# Patient Record
Sex: FEMALE | Race: OTHER | HISPANIC OR LATINO
[De-identification: names, ages, dates, MRNs, and addresses within clinical notes are randomized per-mention and may not be internally consistent; named-entity substitution may affect disease eponyms.]

---

## 2017-01-09 ENCOUNTER — RX RENEWAL (OUTPATIENT)
Age: 82
End: 2017-01-09

## 2017-01-11 ENCOUNTER — APPOINTMENT (OUTPATIENT)
Dept: INTERNAL MEDICINE | Facility: CLINIC | Age: 82
End: 2017-01-11

## 2017-01-11 ENCOUNTER — LABORATORY RESULT (OUTPATIENT)
Age: 82
End: 2017-01-11

## 2017-01-11 VITALS
BODY MASS INDEX: 45.64 KG/M2 | HEART RATE: 61 BPM | DIASTOLIC BLOOD PRESSURE: 70 MMHG | OXYGEN SATURATION: 98 % | SYSTOLIC BLOOD PRESSURE: 140 MMHG | TEMPERATURE: 97.8 F | HEIGHT: 62 IN | RESPIRATION RATE: 12 BRPM | WEIGHT: 248 LBS

## 2017-01-18 LAB
ALBUMIN SERPL ELPH-MCNC: 4.2 G/DL
ALP BLD-CCNC: 65 U/L
ALT SERPL-CCNC: 18 U/L
ANION GAP SERPL CALC-SCNC: 12 MMOL/L
AST SERPL-CCNC: 25 U/L
BASOPHILS # BLD AUTO: 0.08 K/UL
BASOPHILS NFR BLD AUTO: 1.8 %
BILIRUB SERPL-MCNC: 0.7 MG/DL
BUN SERPL-MCNC: 17 MG/DL
CALCIUM SERPL-MCNC: 9 MG/DL
CHLORIDE SERPL-SCNC: 102 MMOL/L
CO2 SERPL-SCNC: 29 MMOL/L
CREAT SERPL-MCNC: 0.72 MG/DL
EOSINOPHIL # BLD AUTO: 0.25 K/UL
EOSINOPHIL NFR BLD AUTO: 5.3 %
GLUCOSE SERPL-MCNC: 94 MG/DL
HCT VFR BLD CALC: 39.9 %
HGB BLD-MCNC: 12.5 G/DL
LYMPHOCYTES # BLD AUTO: 1.55 K/UL
LYMPHOCYTES NFR BLD AUTO: 33.3 %
MAN DIFF?: NORMAL
MCHC RBC-ENTMCNC: 31.3 GM/DL
MCHC RBC-ENTMCNC: 32.8 PG
MCV RBC AUTO: 104.7 FL
MONOCYTES # BLD AUTO: 0.53 K/UL
MONOCYTES NFR BLD AUTO: 11.4 %
NEUTROPHILS # BLD AUTO: 2.12 K/UL
NEUTROPHILS NFR BLD AUTO: 45.6 %
PLATELET # BLD AUTO: 206 K/UL
POTASSIUM SERPL-SCNC: 4.2 MMOL/L
PROT SERPL-MCNC: 7.4 G/DL
RBC # BLD: 3.81 M/UL
RBC # FLD: 20.9 %
SODIUM SERPL-SCNC: 143 MMOL/L
WBC # FLD AUTO: 4.66 K/UL

## 2017-02-15 ENCOUNTER — APPOINTMENT (OUTPATIENT)
Dept: INTERNAL MEDICINE | Facility: CLINIC | Age: 82
End: 2017-02-15

## 2017-03-23 ENCOUNTER — APPOINTMENT (OUTPATIENT)
Dept: INTERNAL MEDICINE | Facility: CLINIC | Age: 82
End: 2017-03-23

## 2017-03-23 VITALS
DIASTOLIC BLOOD PRESSURE: 80 MMHG | SYSTOLIC BLOOD PRESSURE: 126 MMHG | OXYGEN SATURATION: 96 % | TEMPERATURE: 98.1 F | HEART RATE: 81 BPM

## 2017-03-23 DIAGNOSIS — R53.83 OTHER FATIGUE: ICD-10-CM

## 2017-03-23 DIAGNOSIS — H26.9 UNSPECIFIED CATARACT: ICD-10-CM

## 2017-10-10 ENCOUNTER — RX RENEWAL (OUTPATIENT)
Age: 82
End: 2017-10-10

## 2017-10-15 ENCOUNTER — RX RENEWAL (OUTPATIENT)
Age: 82
End: 2017-10-15

## 2018-04-24 ENCOUNTER — RX RENEWAL (OUTPATIENT)
Age: 83
End: 2018-04-24

## 2018-07-20 ENCOUNTER — RX RENEWAL (OUTPATIENT)
Age: 83
End: 2018-07-20

## 2018-08-10 ENCOUNTER — TRANSCRIPTION ENCOUNTER (OUTPATIENT)
Age: 83
End: 2018-08-10

## 2018-08-29 ENCOUNTER — RX RENEWAL (OUTPATIENT)
Age: 83
End: 2018-08-29

## 2018-08-30 ENCOUNTER — APPOINTMENT (OUTPATIENT)
Dept: INTERNAL MEDICINE | Facility: CLINIC | Age: 83
End: 2018-08-30
Payer: MEDICARE

## 2018-08-30 ENCOUNTER — LABORATORY RESULT (OUTPATIENT)
Age: 83
End: 2018-08-30

## 2018-08-30 VITALS
TEMPERATURE: 97.7 F | OXYGEN SATURATION: 96 % | SYSTOLIC BLOOD PRESSURE: 132 MMHG | DIASTOLIC BLOOD PRESSURE: 70 MMHG | WEIGHT: 260 LBS | HEIGHT: 62 IN | HEART RATE: 75 BPM | BODY MASS INDEX: 47.84 KG/M2 | RESPIRATION RATE: 12 BRPM

## 2018-08-30 PROCEDURE — 36415 COLL VENOUS BLD VENIPUNCTURE: CPT

## 2018-08-30 PROCEDURE — 99213 OFFICE O/P EST LOW 20 MIN: CPT | Mod: 25

## 2018-08-30 RX ORDER — ZOSTER VACCINE RECOMBINANT, ADJUVANTED 50 MCG/0.5
50 KIT INTRAMUSCULAR
Qty: 1 | Refills: 1 | Status: ACTIVE | COMMUNITY
Start: 2018-08-30 | End: 1900-01-01

## 2018-08-30 NOTE — PHYSICAL EXAM
[No Acute Distress] : no acute distress [Well Nourished] : well nourished [de-identified] : leaning to right slightly with gait

## 2018-08-30 NOTE — PLAN
[FreeTextEntry1] : referral for PT with unsteady gait\par  Check thyroid - tsh level today\par  continue synthroid

## 2018-08-30 NOTE — HISTORY OF PRESENT ILLNESS
[FreeTextEntry1] : thyroid follow up [de-identified] : Needs refill of medication\par \par Balance has been off recently -  - usually walks with a shopping cart.    not using a cane but has one.   Walking at house no issue.

## 2018-08-31 LAB
ALBUMIN SERPL ELPH-MCNC: 4.6 G/DL
ALP BLD-CCNC: 63 U/L
ALT SERPL-CCNC: 21 U/L
ANION GAP SERPL CALC-SCNC: 15 MMOL/L
AST SERPL-CCNC: 26 U/L
BASOPHILS # BLD AUTO: 0.04 K/UL
BASOPHILS NFR BLD AUTO: 0.9 %
BILIRUB SERPL-MCNC: 0.8 MG/DL
BUN SERPL-MCNC: 16 MG/DL
CALCIUM SERPL-MCNC: 9.8 MG/DL
CHLORIDE SERPL-SCNC: 105 MMOL/L
CHOLEST SERPL-MCNC: 130 MG/DL
CHOLEST/HDLC SERPL: 1.9 RATIO
CO2 SERPL-SCNC: 28 MMOL/L
CREAT SERPL-MCNC: 0.71 MG/DL
EOSINOPHIL # BLD AUTO: 0.07 K/UL
EOSINOPHIL NFR BLD AUTO: 1.7 %
GLUCOSE SERPL-MCNC: 102 MG/DL
HBA1C MFR BLD HPLC: 5.7 %
HCT VFR BLD CALC: 37.4 %
HDLC SERPL-MCNC: 70 MG/DL
HGB BLD-MCNC: 11.8 G/DL
LDLC SERPL CALC-MCNC: 43 MG/DL
LYMPHOCYTES # BLD AUTO: 1.18 K/UL
LYMPHOCYTES NFR BLD AUTO: 29.6 %
MAN DIFF?: NORMAL
MCHC RBC-ENTMCNC: 31.6 GM/DL
MCHC RBC-ENTMCNC: 32.1 PG
MCV RBC AUTO: 101.6 FL
MONOCYTES # BLD AUTO: 0.48 K/UL
MONOCYTES NFR BLD AUTO: 12.2 %
NEUTROPHILS # BLD AUTO: 2.14 K/UL
NEUTROPHILS NFR BLD AUTO: 53.9 %
PLATELET # BLD AUTO: 188 K/UL
POTASSIUM SERPL-SCNC: 4.1 MMOL/L
PROT SERPL-MCNC: 7.9 G/DL
RBC # BLD: 3.68 M/UL
RBC # FLD: 22.9 %
SODIUM SERPL-SCNC: 148 MMOL/L
TRIGL SERPL-MCNC: 87 MG/DL
TSH SERPL-ACNC: 13.27 UIU/ML
WBC # FLD AUTO: 3.97 K/UL

## 2018-10-11 ENCOUNTER — MESSAGE (OUTPATIENT)
Age: 83
End: 2018-10-11

## 2018-11-06 ENCOUNTER — EMERGENCY (EMERGENCY)
Facility: HOSPITAL | Age: 83
LOS: 1 days | Discharge: HOME | End: 2018-11-06
Attending: EMERGENCY MEDICINE | Admitting: EMERGENCY MEDICINE
Payer: MEDICARE

## 2018-11-06 VITALS
RESPIRATION RATE: 18 BRPM | TEMPERATURE: 98 F | HEART RATE: 85 BPM | DIASTOLIC BLOOD PRESSURE: 80 MMHG | SYSTOLIC BLOOD PRESSURE: 162 MMHG | OXYGEN SATURATION: 97 %

## 2018-11-06 DIAGNOSIS — S09.90XA UNSPECIFIED INJURY OF HEAD, INITIAL ENCOUNTER: ICD-10-CM
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
## 2018-11-06 DIAGNOSIS — E03.9 HYPOTHYROIDISM, UNSPECIFIED: ICD-10-CM
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
## 2018-11-06 DIAGNOSIS — Z88.1 ALLERGY STATUS TO OTHER ANTIBIOTIC AGENTS STATUS: ICD-10-CM

## 2018-11-06 PROCEDURE — 99284 EMERGENCY DEPT VISIT MOD MDM: CPT

## 2018-11-06 PROCEDURE — 70450 CT HEAD/BRAIN W/O DYE: CPT | Mod: 26

## 2018-11-06 NOTE — ED PROVIDER NOTE - SKIN, MLM
Skin normal color for race, warm, dry and intact. No evidence of rash. + large 5 cm L sided frontal hematoma.

## 2018-11-06 NOTE — ED ADULT NURSE NOTE - CHPI ED NUR SYMPTOMS NEG
no vomiting/no deformity/no numbness/no fever/no abrasion/no bleeding/no tingling/no weakness/no loss of consciousness/no confusion

## 2018-11-06 NOTE — ED PROVIDER NOTE - NEUROLOGICAL, MLM
Alert and oriented, no focal deficits, no motor or sensory deficits. CNs intact. Normal Romberg. Normal finger to nose. No pronator drift. Normal rapid alternating movements/heel to shin. Sensation intact to all extremities. 5/5 strength to all extremities. Alert and oriented, no focal deficits, no motor or sensory deficits. Clear speech. Follows basic commands. No truncal ataxia.

## 2018-11-06 NOTE — ED PROVIDER NOTE - MEDICAL DECISION MAKING DETAILS
82y F with PMHx rhematoid arthritis, hypothyroidism, presents with minor head injury s/p mechanical fall. No LOC. Not on blood thinners. +scalp hematoma on exam, otherwise nonfocal examination. CT head to rule out intracranial bleeding. 82y F with PMHx rhematoid arthritis, hypothyroidism, presents with minor head injury s/p mechanical fall. No LOC. Not on blood thinners. +scalp hematoma on exam, otherwise nonfocal examination. CT brain ordered.

## 2018-11-06 NOTE — ED PROVIDER NOTE - OBJECTIVE STATEMENT
82y F with PMHx rheumatoid arthritis, hypothyroidism (on Synthroid 137mg 5 days/week), fluid retention (on furosemide), takes losartan, not on anticoagulants, accompanied by fellow volunteer at [employer], BIB EMS s/p mechanical fall. 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 No LOC. Pt now with soreness and bruising to area of trauma, but no other complaints. She was able to push herself up using nearby stairs. Pt states her balance is typically "not good", but does not ambulate with cane or walker. Took Advil this morning. Denies dizziness, lightheadedness, CP, SOB, nausea/vomiting, visual changes, neck pain, hip pain. 82y F with PMHx rheumatoid arthritis, hypothyroidism (on Synthroid 137mg 5 days/week), fluid retention (on furosemide), takes losartan, not on anticoagulants, accompanied by fellow volunteer at [employer], BIB EMS s/p mechanical fall. 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 No LOC. Pt now with soreness and bruising to area of trauma, but no other complaints. She was able to push herself up using nearby stairs. Pt states her balance is typically "not good", but does not ambulate with cane or walker. Took Advil this morning. Denies dizziness, lightheadedness, nausea/vomiting, visual changes, neck pain, hip pain.

## 2018-11-06 NOTE — ED ADULT TRIAGE NOTE - CHIEF COMPLAINT QUOTE
Pt BIBA s/p mechanical fall with +head injury, no LOC, no use of blood thinners. Pt presents with hematoma to left forehead and pain to site, denies any disorientation/confusion/dizziness or blurred vision.

## 2018-11-06 NOTE — ED ADULT NURSE NOTE - OBJECTIVE STATEMENT
Patient presents to ED s/p mechanical fall with head injury. - LOC. Hematoma noted to left forehead. Pt denies any pain or discomfort, HA, N/V, blurry vision, dizziness. Pt reports she is unbalanced at baseline. Pt states she was able to push herself up to seated position but was unable to stand and walk after because she has bad knees.

## 2018-11-06 NOTE — ED ADULT NURSE NOTE - NSIMPLEMENTINTERV_GEN_ALL_ED
Implemented All Fall Risk Interventions:  [place] to call system. Call bell, personal items and telephone within reach. Instruct patient to call for assistance. Room bathroom lighting operational. Non-slip footwear when patient is off stretcher. Physically safe environment: no spills, clutter or unnecessary equipment. Stretcher in lowest position, wheels locked, appropriate side rails in place. Provide visual cue, wrist band, yellow gown, etc. Monitor gait and stability. Monitor for mental status changes and reorient to person, place, and time. Review medications for side effects contributing to fall risk. Reinforce activity limits and safety measures with patient and family.

## 2018-11-06 NOTE — ED PROVIDER NOTE - PROGRESS NOTE DETAILS
NO acute findings on CT scan.  D/w patient.  Concussion precautions given in detail. Conservative management discussed with the patient in detail.  Close PMD follow up encouraged.  Strict ED return instructions discussed in detail and patient given the opportunity to ask any questions about their discharge diagnosis and instructions

## 2019-01-17 ENCOUNTER — RX RENEWAL (OUTPATIENT)
Age: 84
End: 2019-01-17

## 2019-01-28 PROBLEM — E03.9 HYPOTHYROIDISM, UNSPECIFIED: Chronic | Status: ACTIVE | Noted: 2018-11-06

## 2019-01-28 PROBLEM — R60.9 EDEMA, UNSPECIFIED: Chronic | Status: ACTIVE | Noted: 2018-11-06

## 2019-02-08 ENCOUNTER — LABORATORY RESULT (OUTPATIENT)
Age: 84
End: 2019-02-08

## 2019-02-08 ENCOUNTER — APPOINTMENT (OUTPATIENT)
Dept: HEART AND VASCULAR | Facility: CLINIC | Age: 84
End: 2019-02-08
Payer: MEDICARE

## 2019-02-08 VITALS
BODY MASS INDEX: 48.29 KG/M2 | TEMPERATURE: 97.6 F | OXYGEN SATURATION: 96 % | WEIGHT: 264 LBS | DIASTOLIC BLOOD PRESSURE: 79 MMHG | RESPIRATION RATE: 14 BRPM | HEART RATE: 79 BPM | SYSTOLIC BLOOD PRESSURE: 124 MMHG

## 2019-02-08 PROCEDURE — 36415 COLL VENOUS BLD VENIPUNCTURE: CPT

## 2019-02-08 PROCEDURE — 93000 ELECTROCARDIOGRAM COMPLETE: CPT

## 2019-02-08 PROCEDURE — G0008: CPT

## 2019-02-08 PROCEDURE — 93306 TTE W/DOPPLER COMPLETE: CPT

## 2019-02-08 PROCEDURE — 99214 OFFICE O/P EST MOD 30 MIN: CPT

## 2019-02-08 PROCEDURE — 90662 IIV NO PRSV INCREASED AG IM: CPT

## 2019-02-08 NOTE — REASON FOR VISIT
[Follow-Up - Clinic] : a clinic follow-up of [Hypertension] : hypertension [Medication Management] : Medication management [FreeTextEntry1] : 83 year old female , only child,  with past hx of   exertional dyspnea and fatigue. She also reports exertional pain in back of neck without dizziness, syncope, chest pains. denies prior hx of MI, angina, arrhythmias, strokes, CHF.  Has hx of thyroid ablation  and takes levothyroxine with  most recent TSH was high in August 2018. .   She reports that while walking at a normal pace , she becomes short of breath within half a block. \par \par She denies hx of HTN

## 2019-02-08 NOTE — PHYSICAL EXAM
[Well Groomed] : well groomed [No Deformities] : no deformities [General Appearance - In No Acute Distress] : no acute distress [Normal Conjunctiva] : the conjunctiva exhibited no abnormalities [Eyelids - No Xanthelasma] : the eyelids demonstrated no xanthelasmas [Normal Oral Mucosa] : normal oral mucosa [No Oral Pallor] : no oral pallor [No Oral Cyanosis] : no oral cyanosis [Normal Jugular Venous A Waves Present] : normal jugular venous A waves present [Normal Jugular Venous V Waves Present] : normal jugular venous V waves present [No Jugular Venous Stephen A Waves] : no jugular venous stephen A waves [Respiration, Rhythm And Depth] : normal respiratory rhythm and effort [Exaggerated Use Of Accessory Muscles For Inspiration] : no accessory muscle use [Auscultation Breath Sounds / Voice Sounds] : lungs were clear to auscultation bilaterally [Heart Rate And Rhythm] : heart rate and rhythm were normal [Heart Sounds] : normal S1 and S2 [Arterial Pulses Normal] : the arterial pulses were normal [Veins - Varicosity Changes] : no varicosital changes were noted in the lower extremities [Systolic grade ___/6] : A grade [unfilled]/6 systolic murmur was heard. [Abnormal Walk] : normal gait [Gait - Sufficient For Exercise Testing] : the gait was sufficient for exercise testing [Nail Clubbing] : no clubbing of the fingernails [Cyanosis, Localized] : no localized cyanosis [Petechial Hemorrhages (___cm)] : no petechial hemorrhages [Nail Splinter Hemorrhages] : no splinter hemorrhages of the nails [Fingers Osler's Nodes] : Osler's nodes were not seenon the fingers [Skin Turgor] : normal skin turgor [] : no rash [No Venous Stasis] : no venous stasis [Skin Lesions] : no skin lesions [No Skin Ulcers] : no skin ulcer [No Xanthoma] : no  xanthoma was observed [Oriented To Time, Place, And Person] : oriented to person, place, and time [Impaired Insight] : insight and judgment were intact [Affect] : the affect was normal [Mood] : the mood was normal [Memory Recent] : recent memory was not impaired [Memory Remote] : remote memory was not impaired [No Anxiety] : not feeling anxious [FreeTextEntry1] : acne rosacea

## 2019-02-08 NOTE — ASSESSMENT
[FreeTextEntry1] : Controlled HTN\par \par fall   with closed head injury  without  intracranial bleeding \par \par  dry macular degeneration right eye \par \par \par Moderate mitral regurgitation

## 2019-02-08 NOTE — DISCUSSION/SUMMARY
[Essential Hypertension] : essential hypertension [Deteriorating] : deteriorating [None] : none [With Me] : with me [FreeTextEntry1] : venipuncture with TSH, lipids, Hgba1c \par \par \par echocardiogram to assess status of mitral regurgitation

## 2019-02-08 NOTE — REVIEW OF SYSTEMS
[Recent Weight Loss (___ Lbs)] : recent [unfilled] ~Ulb weight loss [Dyspnea on exertion] : dyspnea during exertion [Lower Ext Edema] : lower extremity edema [Joint Pain] : joint pain [Joint Stiffness] : joint stiffness [Negative] : Heme/Lymph [Fever] : no fever [Headache] : no headache [Chills] : no chills [Feeling Fatigued] : not feeling fatigued [Shortness Of Breath] : no shortness of breath [Chest  Pressure] : no chest pressure [Chest Pain] : no chest pain [Leg Claudication] : no intermittent leg claudication [Palpitations] : no palpitations [Joint Swelling] : no joint swelling [Muscle Cramps] : no muscle cramps [Limb Weakness (Paresis)] : no limb weakness

## 2019-02-08 NOTE — HISTORY OF PRESENT ILLNESS
[FreeTextEntry1] : Had bad   trip and fall  November 6 and sustained a contusion of the left frontal area with negative CT scan of head but had mild concussion. No fractures \par \par Compliant with all medications \par \par Requires TSH with reflex T4 , HgbA1c

## 2019-02-09 LAB
25(OH)D3 SERPL-MCNC: 43.3 NG/ML
ALBUMIN SERPL ELPH-MCNC: 4.7 G/DL
ALP BLD-CCNC: 65 U/L
ALT SERPL-CCNC: 16 U/L
ANION GAP SERPL CALC-SCNC: 12 MMOL/L
AST SERPL-CCNC: 24 U/L
BASOPHILS # BLD AUTO: 0 K/UL
BASOPHILS NFR BLD AUTO: 0 %
BILIRUB SERPL-MCNC: 0.7 MG/DL
BUN SERPL-MCNC: 17 MG/DL
CALCIUM SERPL-MCNC: 9.7 MG/DL
CHLORIDE SERPL-SCNC: 103 MMOL/L
CO2 SERPL-SCNC: 28 MMOL/L
CREAT SERPL-MCNC: 0.66 MG/DL
EOSINOPHIL # BLD AUTO: 0.04 K/UL
EOSINOPHIL NFR BLD AUTO: 0.9 %
ESTIMATED AVERAGE GLUCOSE: 114 MG/DL
GLUCOSE SERPL-MCNC: 90 MG/DL
HBA1C MFR BLD HPLC: 5.6 %
HCT VFR BLD CALC: 39.1 %
HGB BLD-MCNC: 12 G/DL
LYMPHOCYTES # BLD AUTO: 1.36 K/UL
LYMPHOCYTES NFR BLD AUTO: 31.6 %
MAN DIFF?: NORMAL
MCHC RBC-ENTMCNC: 30.7 GM/DL
MCHC RBC-ENTMCNC: 30.8 PG
MCV RBC AUTO: 100.5 FL
MONOCYTES # BLD AUTO: 0.34 K/UL
MONOCYTES NFR BLD AUTO: 7.9 %
NEUTROPHILS # BLD AUTO: 2.38 K/UL
NEUTROPHILS NFR BLD AUTO: 55.2 %
PLATELET # BLD AUTO: 202 K/UL
POTASSIUM SERPL-SCNC: 4.3 MMOL/L
PROT SERPL-MCNC: 8 G/DL
RBC # BLD: 3.89 M/UL
RBC # FLD: 21.7 %
SODIUM SERPL-SCNC: 143 MMOL/L
WBC # FLD AUTO: 4.31 K/UL

## 2019-02-10 LAB — TSH SERPL-ACNC: 8.22 UIU/ML

## 2019-09-14 ENCOUNTER — RX RENEWAL (OUTPATIENT)
Age: 84
End: 2019-09-14

## 2019-11-04 ENCOUNTER — MESSAGE (OUTPATIENT)
Age: 84
End: 2019-11-04

## 2019-12-27 ENCOUNTER — RX RENEWAL (OUTPATIENT)
Age: 84
End: 2019-12-27

## 2020-01-22 ENCOUNTER — LABORATORY RESULT (OUTPATIENT)
Age: 85
End: 2020-01-22

## 2020-01-22 ENCOUNTER — APPOINTMENT (OUTPATIENT)
Dept: HEART AND VASCULAR | Facility: CLINIC | Age: 85
End: 2020-01-22
Payer: MEDICARE

## 2020-01-22 VITALS
HEIGHT: 62 IN | DIASTOLIC BLOOD PRESSURE: 60 MMHG | SYSTOLIC BLOOD PRESSURE: 110 MMHG | OXYGEN SATURATION: 99 % | HEART RATE: 63 BPM | WEIGHT: 267 LBS | BODY MASS INDEX: 49.13 KG/M2 | RESPIRATION RATE: 14 BRPM

## 2020-01-22 DIAGNOSIS — R06.00 DYSPNEA, UNSPECIFIED: ICD-10-CM

## 2020-01-22 DIAGNOSIS — I34.0 NONRHEUMATIC MITRAL (VALVE) INSUFFICIENCY: ICD-10-CM

## 2020-01-22 DIAGNOSIS — I49.3 VENTRICULAR PREMATURE DEPOLARIZATION: ICD-10-CM

## 2020-01-22 DIAGNOSIS — R94.31 ABNORMAL ELECTROCARDIOGRAM [ECG] [EKG]: ICD-10-CM

## 2020-01-22 DIAGNOSIS — Z86.39 PERSONAL HISTORY OF OTHER ENDOCRINE, NUTRITIONAL AND METABOLIC DISEASE: ICD-10-CM

## 2020-01-22 PROCEDURE — 93306 TTE W/DOPPLER COMPLETE: CPT

## 2020-01-22 PROCEDURE — 99214 OFFICE O/P EST MOD 30 MIN: CPT

## 2020-01-22 PROCEDURE — 36415 COLL VENOUS BLD VENIPUNCTURE: CPT

## 2020-01-22 PROCEDURE — G0008: CPT

## 2020-01-22 PROCEDURE — 93000 ELECTROCARDIOGRAM COMPLETE: CPT

## 2020-01-22 PROCEDURE — 90662 IIV NO PRSV INCREASED AG IM: CPT

## 2020-01-22 NOTE — REASON FOR VISIT
[Follow-Up - Clinic] : a clinic follow-up of [Abnormal ECG] : an abnormal ECG [Dyspnea] : dyspnea [Hyperlipidemia] : hyperlipidemia [FreeTextEntry1] : 84  year old female , only child,  with past hx of   exertional dyspnea and fatigue has hypertension , adult hypothyroidism  and morbid obesity . \par \par Prior echocardiogram showed severely enlarged left atrium  and moderate pulmonary hypertension with at least moderate MR  and mild aortic stenosis \par \par She denies hx of MI, CHF, strokes , syncope \par \par

## 2020-01-22 NOTE — ASSESSMENT
[FreeTextEntry1] : stable hemodynamics \par \par Numerous unifocal VPCs\par \par BMI 49\par \par \par \par

## 2020-01-22 NOTE — PHYSICAL EXAM
[Well Groomed] : well groomed [No Deformities] : no deformities [General Appearance - In No Acute Distress] : no acute distress [Normal Conjunctiva] : the conjunctiva exhibited no abnormalities [Eyelids - No Xanthelasma] : the eyelids demonstrated no xanthelasmas [No Oral Cyanosis] : no oral cyanosis [Normal Jugular Venous A Waves Present] : normal jugular venous A waves present [Normal Jugular Venous V Waves Present] : normal jugular venous V waves present [No Jugular Venous Stephen A Waves] : no jugular venous stephen A waves [Respiration, Rhythm And Depth] : normal respiratory rhythm and effort [Exaggerated Use Of Accessory Muscles For Inspiration] : no accessory muscle use [Auscultation Breath Sounds / Voice Sounds] : lungs were clear to auscultation bilaterally [Heart Rate And Rhythm] : heart rate and rhythm were normal [Heart Sounds] : normal S1 and S2 [Arterial Pulses Normal] : the arterial pulses were normal [Veins - Varicosity Changes] : no varicosital changes were noted in the lower extremities [Systolic grade ___/6] : A grade [unfilled]/6 systolic murmur was heard. [Abnormal Walk] : normal gait [Gait - Sufficient For Exercise Testing] : the gait was sufficient for exercise testing [Nail Clubbing] : no clubbing of the fingernails [Cyanosis, Localized] : no localized cyanosis [Petechial Hemorrhages (___cm)] : no petechial hemorrhages [Nail Splinter Hemorrhages] : no splinter hemorrhages of the nails [Fingers Osler's Nodes] : Osler's nodes were not seenon the fingers [Skin Turgor] : normal skin turgor [] : no rash [No Venous Stasis] : no venous stasis [Skin Lesions] : no skin lesions [No Skin Ulcers] : no skin ulcer [No Xanthoma] : no  xanthoma was observed [FreeTextEntry1] : acne rosacea  [Oriented To Time, Place, And Person] : oriented to person, place, and time [Impaired Insight] : insight and judgment were intact [Affect] : the affect was normal [Mood] : the mood was normal [Memory Recent] : recent memory was not impaired [Memory Remote] : remote memory was not impaired [No Anxiety] : not feeling anxious

## 2020-01-22 NOTE — DISCUSSION/SUMMARY
[PVCs] : ectopic ventricular beats [Deteriorating] : deteriorating [Echocardiogram] : an echocardiogram [Holter Monitor] : a Holter monitor [Outpatient Evaluation] : the evaluation will be done as an outpatient [With PCP] : with ~his/her~ primary care provider [FreeTextEntry1] : venipuncture performed \par \par HD flu vaccine administered  left deltoid \par \par echocardiogram results showed preserved LVEF with  moderate MR  and moderate pulmonary hypertension , atrial enlargement \par \par holter monitor placed  to assess for NSVT / VPC volume \par \par Medications reconciled and renewed \par \par IF holter is unrevealing,  will set up coronary CTA

## 2020-01-22 NOTE — HISTORY OF PRESENT ILLNESS
[FreeTextEntry1] : EKG shows NSR  61 bpm with numerous , unifocal VPCs  without ischemia  , axis shift or LVH . \par \par She does c/o positional dizziness upon arising from reclining position\par \par Requires flu vaccine today \par \par Has dyspnea on exertion after less than one block , but is mostly sedentary \par \par Had a basal cell carcinoma recently removed from her right deltoid \par \par Last pharmacologic nuclear stress test 2016 was non ischemia  and LVEF was 64%

## 2020-01-22 NOTE — REVIEW OF SYSTEMS
[Fever] : no fever [Headache] : no headache [Recent Weight Gain (___ Lbs)] : recent [unfilled] ~Ulb weight gain [Chills] : no chills [Feeling Fatigued] : not feeling fatigued [Recent Weight Loss (___ Lbs)] : no recent weight loss [Shortness Of Breath] : no shortness of breath [Dyspnea on exertion] : dyspnea during exertion [Chest  Pressure] : no chest pressure [Chest Pain] : no chest pain [Lower Ext Edema] : lower extremity edema [Leg Claudication] : no intermittent leg claudication [Palpitations] : no palpitations [Joint Pain] : joint pain [Joint Swelling] : no joint swelling [Joint Stiffness] : joint stiffness [Muscle Cramps] : no muscle cramps [Limb Weakness (Paresis)] : no limb weakness [Dizziness] : dizziness [Negative] : Heme/Lymph

## 2020-01-23 LAB
ALBUMIN SERPL ELPH-MCNC: 4.5 G/DL
ALP BLD-CCNC: 64 U/L
ALT SERPL-CCNC: 15 U/L
ANION GAP SERPL CALC-SCNC: 13 MMOL/L
APPEARANCE: CLEAR
AST SERPL-CCNC: 23 U/L
BASOPHILS # BLD AUTO: 0.03 K/UL
BASOPHILS NFR BLD AUTO: 0.6 %
BILIRUB SERPL-MCNC: 0.6 MG/DL
BILIRUBIN URINE: NEGATIVE
BLOOD URINE: NEGATIVE
BUN SERPL-MCNC: 23 MG/DL
CALCIUM SERPL-MCNC: 9.9 MG/DL
CHLORIDE SERPL-SCNC: 106 MMOL/L
CHOLEST SERPL-MCNC: 139 MG/DL
CHOLEST/HDLC SERPL: 1.9 RATIO
CO2 SERPL-SCNC: 29 MMOL/L
COLOR: YELLOW
CREAT SERPL-MCNC: 0.74 MG/DL
CREAT SPEC-SCNC: 97 MG/DL
EOSINOPHIL # BLD AUTO: 0.08 K/UL
EOSINOPHIL NFR BLD AUTO: 1.5 %
ESTIMATED AVERAGE GLUCOSE: 117 MG/DL
GLUCOSE QUALITATIVE U: NEGATIVE
GLUCOSE SERPL-MCNC: 81 MG/DL
HBA1C MFR BLD HPLC: 5.7 %
HCT VFR BLD CALC: 37.2 %
HDLC SERPL-MCNC: 73 MG/DL
HGB BLD-MCNC: 11.4 G/DL
IMM GRANULOCYTES NFR BLD AUTO: 2.3 %
KETONES URINE: NEGATIVE
LDLC SERPL CALC-MCNC: 49 MG/DL
LEUKOCYTE ESTERASE URINE: ABNORMAL
LYMPHOCYTES # BLD AUTO: 1.59 K/UL
LYMPHOCYTES NFR BLD AUTO: 30.3 %
MAGNESIUM SERPL-MCNC: 2.3 MG/DL
MAN DIFF?: NORMAL
MCHC RBC-ENTMCNC: 30.6 GM/DL
MCHC RBC-ENTMCNC: 31.8 PG
MCV RBC AUTO: 103.6 FL
MICROALBUMIN 24H UR DL<=1MG/L-MCNC: 9.5 MG/DL
MICROALBUMIN/CREAT 24H UR-RTO: 98 MG/G
MONOCYTES # BLD AUTO: 0.71 K/UL
MONOCYTES NFR BLD AUTO: 13.5 %
NEUTROPHILS # BLD AUTO: 2.71 K/UL
NEUTROPHILS NFR BLD AUTO: 51.8 %
NITRITE URINE: NEGATIVE
PH URINE: 5.5
PLATELET # BLD AUTO: 193 K/UL
POTASSIUM SERPL-SCNC: 4.7 MMOL/L
PROT SERPL-MCNC: 7.5 G/DL
PROTEIN URINE: NORMAL
RBC # BLD: 3.59 M/UL
RBC # FLD: 23 %
SODIUM SERPL-SCNC: 147 MMOL/L
SPECIFIC GRAVITY URINE: 1.02
TRIGL SERPL-MCNC: 87 MG/DL
TSH SERPL-ACNC: 8.51 UIU/ML
UROBILINOGEN URINE: NORMAL
WBC # FLD AUTO: 5.24 K/UL

## 2020-07-23 ENCOUNTER — RX RENEWAL (OUTPATIENT)
Age: 85
End: 2020-07-23

## 2020-10-23 ENCOUNTER — APPOINTMENT (OUTPATIENT)
Dept: INTERNAL MEDICINE | Facility: CLINIC | Age: 85
End: 2020-10-23
Payer: MEDICARE

## 2020-10-23 ENCOUNTER — LABORATORY RESULT (OUTPATIENT)
Age: 85
End: 2020-10-23

## 2020-10-23 VITALS
HEART RATE: 76 BPM | SYSTOLIC BLOOD PRESSURE: 112 MMHG | WEIGHT: 263 LBS | DIASTOLIC BLOOD PRESSURE: 76 MMHG | BODY MASS INDEX: 48.4 KG/M2 | OXYGEN SATURATION: 97 % | HEIGHT: 62 IN | TEMPERATURE: 98.6 F | RESPIRATION RATE: 14 BRPM

## 2020-10-23 DIAGNOSIS — Z00.00 ENCOUNTER FOR GENERAL ADULT MEDICAL EXAMINATION W/OUT ABNORMAL FINDINGS: ICD-10-CM

## 2020-10-23 DIAGNOSIS — E03.9 HYPOTHYROIDISM, UNSPECIFIED: ICD-10-CM

## 2020-10-23 DIAGNOSIS — I11.9 HYPERTENSIVE HEART DISEASE W/OUT HEART FAILURE: ICD-10-CM

## 2020-10-23 DIAGNOSIS — Z23 ENCOUNTER FOR IMMUNIZATION: ICD-10-CM

## 2020-10-23 PROCEDURE — 99397 PER PM REEVAL EST PAT 65+ YR: CPT | Mod: 25

## 2020-10-23 PROCEDURE — 90471 IMMUNIZATION ADMIN: CPT

## 2020-10-23 PROCEDURE — 99072 ADDL SUPL MATRL&STAF TM PHE: CPT

## 2020-10-23 PROCEDURE — 90662 IIV NO PRSV INCREASED AG IM: CPT

## 2020-10-23 PROCEDURE — 36415 COLL VENOUS BLD VENIPUNCTURE: CPT

## 2020-10-25 ENCOUNTER — NON-APPOINTMENT (OUTPATIENT)
Age: 85
End: 2020-10-25

## 2020-10-25 LAB
25(OH)D3 SERPL-MCNC: 36 NG/ML
ALBUMIN SERPL ELPH-MCNC: 4.6 G/DL
ALP BLD-CCNC: 63 U/L
ALT SERPL-CCNC: 15 U/L
ANION GAP SERPL CALC-SCNC: 11 MMOL/L
APPEARANCE: ABNORMAL
AST SERPL-CCNC: 24 U/L
BASOPHILS # BLD AUTO: 0.04 K/UL
BASOPHILS NFR BLD AUTO: 0.9 %
BILIRUB SERPL-MCNC: 0.6 MG/DL
BILIRUBIN URINE: NEGATIVE
BLOOD URINE: NEGATIVE
BUN SERPL-MCNC: 25 MG/DL
CALCIUM SERPL-MCNC: 8.9 MG/DL
CHLORIDE SERPL-SCNC: 103 MMOL/L
CHOLEST SERPL-MCNC: 120 MG/DL
CO2 SERPL-SCNC: 30 MMOL/L
COLOR: YELLOW
CREAT SERPL-MCNC: 0.82 MG/DL
EOSINOPHIL # BLD AUTO: 0.04 K/UL
EOSINOPHIL NFR BLD AUTO: 0.9 %
ESTIMATED AVERAGE GLUCOSE: 117 MG/DL
FERRITIN SERPL-MCNC: 664 NG/ML
FOLATE SERPL-MCNC: >20 NG/ML
GLUCOSE QUALITATIVE U: NEGATIVE
GLUCOSE SERPL-MCNC: 81 MG/DL
HBA1C MFR BLD HPLC: 5.7 %
HCT VFR BLD CALC: 39.4 %
HDLC SERPL-MCNC: 64 MG/DL
HGB BLD-MCNC: 12.2 G/DL
IRON SATN MFR SERPL: 46 %
IRON SERPL-MCNC: 119 UG/DL
KETONES URINE: NEGATIVE
LDLC SERPL CALC-MCNC: 41 MG/DL
LEUKOCYTE ESTERASE URINE: ABNORMAL
LYMPHOCYTES # BLD AUTO: 1.73 K/UL
LYMPHOCYTES NFR BLD AUTO: 37.4 %
MAN DIFF?: NORMAL
MCHC RBC-ENTMCNC: 31 GM/DL
MCHC RBC-ENTMCNC: 31.8 PG
MCV RBC AUTO: 102.6 FL
MONOCYTES # BLD AUTO: 0.44 K/UL
MONOCYTES NFR BLD AUTO: 9.5 %
NEUTROPHILS # BLD AUTO: 2.25 K/UL
NEUTROPHILS NFR BLD AUTO: 48.7 %
NITRITE URINE: POSITIVE
NONHDLC SERPL-MCNC: 56 MG/DL
PH URINE: 5.5
PLATELET # BLD AUTO: 185 K/UL
POTASSIUM SERPL-SCNC: 4.2 MMOL/L
PROT SERPL-MCNC: 7.7 G/DL
PROTEIN URINE: ABNORMAL
RBC # BLD: 3.84 M/UL
RBC # FLD: 22.5 %
SODIUM SERPL-SCNC: 144 MMOL/L
SPECIFIC GRAVITY URINE: 1.03
T3FREE SERPL-MCNC: 2.47 PG/ML
T4 FREE SERPL-MCNC: 1.7 NG/DL
TIBC SERPL-MCNC: 260 UG/DL
TRIGL SERPL-MCNC: 79 MG/DL
TSH SERPL-ACNC: 5.52 UIU/ML
UIBC SERPL-MCNC: 141 UG/DL
UROBILINOGEN URINE: NORMAL
VIT B12 SERPL-MCNC: 1101 PG/ML
WBC # FLD AUTO: 4.63 K/UL

## 2020-10-25 NOTE — HISTORY OF PRESENT ILLNESS
[FreeTextEntry1] : Well visit  [de-identified] : 85 yo female with hx of hypothyroidism, fatigue reports for well visit. Last visit 8/2018.\par \par Reports decreased balance and feeling unsteady, needs to always hold onto something.\par \par Feeling fatigued, always tired\par  Reports poor sleep. Sleeps for 1.5 hours, awake for 3-4 hours, and then sleep afterwards.\par In the afternoon, she naps for 30 minutes. Hard to fall asleep. \par \par Feet: numb but not painful..feel like they're made of lead, for quite 1.5 years.\par \par Right ring finger: get stuck. when she flexes it, worsens her .\par All the times..during the night, during the day, feels burning sensation. \par \par Takes B6 (1980's, had carpal tunnel syndrome), Centrum Silver, D3\par \par Finger tips: cannot  things very well--decreased fine motor skills.\par \par Has not had a mammogram or bone density scan in years. \par

## 2020-10-25 NOTE — END OF VISIT
[] : A student assisted with documenting this visit. I have reviewed and verified all information documented by the student, and made modifications to such information, when appropriate.
[] : A student assisted with documenting this visit. I have reviewed and verified all information documented by the student, and made modifications to such information, when appropriate.
Patient admitted for fetal monitoring in the setting of newly diagnosed intermittent absent end diastolic flow.  Fetal status reassuring, Ultrasound repeated and demonstrated elevated S/D ratio but no IAEDF.  No evidence of PEC. Discharged home to follow up with twice weekly fetal monitoring.

## 2020-10-25 NOTE — HISTORY OF PRESENT ILLNESS
[FreeTextEntry1] : Well visit  [de-identified] : 85 yo female with hx of hypothyroidism, fatigue reports for well visit. Last visit 8/2018.\par \par Reports decreased balance and feeling unsteady, needs to always hold onto something.\par \par Feeling fatigued, always tired\par  Reports poor sleep. Sleeps for 1.5 hours, awake for 3-4 hours, and then sleep afterwards.\par In the afternoon, she naps for 30 minutes. Hard to fall asleep. \par \par Feet: numb but not painful..feel like they're made of lead, for quite 1.5 years.\par \par Right ring finger: get stuck. when she flexes it, worsens her .\par All the times..during the night, during the day, feels burning sensation. \par \par Takes B6 (1980's, had carpal tunnel syndrome), Centrum Silver, D3\par \par Finger tips: cannot  things very well--decreased fine motor skills.\par \par Has not had a mammogram or bone density scan in years. \par

## 2020-10-25 NOTE — PHYSICAL EXAM
[No Acute Distress] : no acute distress [Well Nourished] : well nourished [Well Developed] : well developed [Well-Appearing] : well-appearing [Normal Sclera/Conjunctiva] : normal sclera/conjunctiva [PERRL] : pupils equal round and reactive to light [EOMI] : extraocular movements intact [Normal Outer Ear/Nose] : the outer ears and nose were normal in appearance [Normal Oropharynx] : the oropharynx was normal [No JVD] : no jugular venous distention [No Lymphadenopathy] : no lymphadenopathy [Supple] : supple [Thyroid Normal, No Nodules] : the thyroid was normal and there were no nodules present [No Respiratory Distress] : no respiratory distress  [No Accessory Muscle Use] : no accessory muscle use [Clear to Auscultation] : lungs were clear to auscultation bilaterally [Normal Rate] : normal rate  [Normal S1, S2] : normal S1 and S2 [No Murmur] : no murmur heard [No Carotid Bruits] : no carotid bruits [No Abdominal Bruit] : a ~M bruit was not heard ~T in the abdomen [No Varicosities] : no varicosities [Pedal Pulses Present] : the pedal pulses are present [No Edema] : there was no peripheral edema [No Palpable Aorta] : no palpable aorta [No Extremity Clubbing/Cyanosis] : no extremity clubbing/cyanosis [Soft] : abdomen soft [Non Tender] : non-tender [Non-distended] : non-distended [No Masses] : no abdominal mass palpated [No HSM] : no HSM [Normal Bowel Sounds] : normal bowel sounds [Normal Posterior Cervical Nodes] : no posterior cervical lymphadenopathy [Normal Anterior Cervical Nodes] : no anterior cervical lymphadenopathy [No CVA Tenderness] : no CVA  tenderness [No Spinal Tenderness] : no spinal tenderness [Grossly Normal Strength/Tone] : grossly normal strength/tone [No Rash] : no rash [Coordination Grossly Intact] : coordination grossly intact [No Focal Deficits] : no focal deficits [Normal Gait] : normal gait [Normal Affect] : the affect was normal [Normal Insight/Judgement] : insight and judgment were intact [de-identified] : regularly irregular rhythm  [de-identified] : swollen tendon on left palm

## 2020-10-25 NOTE — ASSESSMENT
[FreeTextEntry1] : Labs\par Hypothyroidism: TSH, T4/T3\par Macrocytic Anemia (based on 1/2020 labs): B12, Folate \par \par #Balance difficulties\par Referral for @ home physical therapy\par \par #Ring finger Tendonitis: referral steroid injection from ortho surgeon\par \par #Insomnia: start Trazodone 50mg nightly \par \par Received high dose flu shot today.\par Following up regularly with dermatologist for psoriasis

## 2020-10-25 NOTE — PHYSICAL EXAM
[No Acute Distress] : no acute distress [Well Nourished] : well nourished [Well Developed] : well developed [Well-Appearing] : well-appearing [Normal Sclera/Conjunctiva] : normal sclera/conjunctiva [PERRL] : pupils equal round and reactive to light [EOMI] : extraocular movements intact [Normal Outer Ear/Nose] : the outer ears and nose were normal in appearance [Normal Oropharynx] : the oropharynx was normal [No JVD] : no jugular venous distention [No Lymphadenopathy] : no lymphadenopathy [Supple] : supple [Thyroid Normal, No Nodules] : the thyroid was normal and there were no nodules present [No Respiratory Distress] : no respiratory distress  [No Accessory Muscle Use] : no accessory muscle use [Clear to Auscultation] : lungs were clear to auscultation bilaterally [Normal Rate] : normal rate  [Normal S1, S2] : normal S1 and S2 [No Murmur] : no murmur heard [No Carotid Bruits] : no carotid bruits [No Abdominal Bruit] : a ~M bruit was not heard ~T in the abdomen [No Varicosities] : no varicosities [Pedal Pulses Present] : the pedal pulses are present [No Edema] : there was no peripheral edema [No Palpable Aorta] : no palpable aorta [No Extremity Clubbing/Cyanosis] : no extremity clubbing/cyanosis [Soft] : abdomen soft [Non Tender] : non-tender [Non-distended] : non-distended [No Masses] : no abdominal mass palpated [No HSM] : no HSM [Normal Bowel Sounds] : normal bowel sounds [Normal Posterior Cervical Nodes] : no posterior cervical lymphadenopathy [Normal Anterior Cervical Nodes] : no anterior cervical lymphadenopathy [No CVA Tenderness] : no CVA  tenderness [No Spinal Tenderness] : no spinal tenderness [Grossly Normal Strength/Tone] : grossly normal strength/tone [No Rash] : no rash [Coordination Grossly Intact] : coordination grossly intact [No Focal Deficits] : no focal deficits [Normal Gait] : normal gait [Normal Affect] : the affect was normal [Normal Insight/Judgement] : insight and judgment were intact [de-identified] : regularly irregular rhythm  [de-identified] : swollen tendon on left palm

## 2020-10-25 NOTE — REVIEW OF SYSTEMS
[Fatigue] : fatigue [Dizziness] : dizziness [Unsteady Walking] : ataxia [Negative] : Heme/Lymph [Night Sweats] : no night sweats [Dyspnea on Exertion] : no dyspnea on exertion [Headache] : no headache [Fainting] : no fainting [FreeTextEntry3] : sees black dot  [de-identified] : psoriasis

## 2020-10-25 NOTE — REVIEW OF SYSTEMS
[Fatigue] : fatigue [Dizziness] : dizziness [Unsteady Walking] : ataxia [Negative] : Heme/Lymph [Night Sweats] : no night sweats [Dyspnea on Exertion] : no dyspnea on exertion [Headache] : no headache [Fainting] : no fainting [FreeTextEntry3] : sees black dot  [de-identified] : psoriasis

## 2020-12-04 ENCOUNTER — RX RENEWAL (OUTPATIENT)
Age: 85
End: 2020-12-04

## 2020-12-07 ENCOUNTER — APPOINTMENT (OUTPATIENT)
Dept: ORTHOPEDIC SURGERY | Facility: CLINIC | Age: 85
End: 2020-12-07
Payer: MEDICARE

## 2020-12-07 PROCEDURE — 99072 ADDL SUPL MATRL&STAF TM PHE: CPT

## 2020-12-07 PROCEDURE — 20550 NJX 1 TENDON SHEATH/LIGAMENT: CPT | Mod: F8

## 2020-12-07 PROCEDURE — 73120 X-RAY EXAM OF HAND: CPT | Mod: 50

## 2020-12-07 PROCEDURE — 99203 OFFICE O/P NEW LOW 30 MIN: CPT | Mod: 25

## 2021-01-22 ENCOUNTER — RX RENEWAL (OUTPATIENT)
Age: 86
End: 2021-01-22

## 2021-06-03 ENCOUNTER — RX RENEWAL (OUTPATIENT)
Age: 86
End: 2021-06-03

## 2021-06-06 ENCOUNTER — RX RENEWAL (OUTPATIENT)
Age: 86
End: 2021-06-06

## 2021-06-21 ENCOUNTER — APPOINTMENT (OUTPATIENT)
Dept: ORTHOPEDIC SURGERY | Facility: CLINIC | Age: 86
End: 2021-06-21
Payer: MEDICARE

## 2021-06-21 VITALS — HEIGHT: 62 IN | RESPIRATION RATE: 14 BRPM | BODY MASS INDEX: 48.4 KG/M2 | WEIGHT: 263 LBS

## 2021-06-21 DIAGNOSIS — M65.341 TRIGGER FINGER, RIGHT RING FINGER: ICD-10-CM

## 2021-06-21 PROCEDURE — 99213 OFFICE O/P EST LOW 20 MIN: CPT | Mod: 25

## 2021-06-21 PROCEDURE — 20550 NJX 1 TENDON SHEATH/LIGAMENT: CPT | Mod: F8

## 2021-06-21 PROCEDURE — 99072 ADDL SUPL MATRL&STAF TM PHE: CPT

## 2021-07-19 ENCOUNTER — RX RENEWAL (OUTPATIENT)
Age: 86
End: 2021-07-19

## 2022-01-19 ENCOUNTER — RX RENEWAL (OUTPATIENT)
Age: 87
End: 2022-01-19

## 2022-03-30 ENCOUNTER — RX RENEWAL (OUTPATIENT)
Age: 87
End: 2022-03-30

## 2022-04-27 ENCOUNTER — NON-APPOINTMENT (OUTPATIENT)
Age: 87
End: 2022-04-27

## 2022-04-28 ENCOUNTER — RX RENEWAL (OUTPATIENT)
Age: 87
End: 2022-04-28

## 2022-07-09 ENCOUNTER — INPATIENT (INPATIENT)
Facility: HOSPITAL | Age: 87
LOS: 2 days | Discharge: HOME HEALTH | DRG: 291 | End: 2022-07-12
Attending: HOSPITALIST | Admitting: HOSPITALIST
Payer: MEDICARE

## 2022-07-09 VITALS
SYSTOLIC BLOOD PRESSURE: 166 MMHG | TEMPERATURE: 98 F | WEIGHT: 225.09 LBS | RESPIRATION RATE: 29 BRPM | HEART RATE: 166 BPM | DIASTOLIC BLOOD PRESSURE: 87 MMHG | OXYGEN SATURATION: 90 %

## 2022-07-09 DIAGNOSIS — Z90.49 ACQUIRED ABSENCE OF OTHER SPECIFIED PARTS OF DIGESTIVE TRACT: Chronic | ICD-10-CM

## 2022-07-09 DIAGNOSIS — Z90.710 ACQUIRED ABSENCE OF BOTH CERVIX AND UTERUS: Chronic | ICD-10-CM

## 2022-07-09 LAB
ALBUMIN SERPL ELPH-MCNC: 4.2 G/DL — SIGNIFICANT CHANGE UP (ref 3.3–5)
ALBUMIN SERPL ELPH-MCNC: 4.4 G/DL — SIGNIFICANT CHANGE UP (ref 3.4–5)
ALP SERPL-CCNC: 59 U/L — SIGNIFICANT CHANGE UP (ref 40–120)
ALP SERPL-CCNC: 68 U/L — SIGNIFICANT CHANGE UP (ref 40–120)
ALT FLD-CCNC: 15 U/L — SIGNIFICANT CHANGE UP (ref 10–45)
ALT FLD-CCNC: 36 U/L — SIGNIFICANT CHANGE UP (ref 12–42)
ANION GAP SERPL CALC-SCNC: 10 MMOL/L — SIGNIFICANT CHANGE UP (ref 5–17)
ANION GAP SERPL CALC-SCNC: 10 MMOL/L — SIGNIFICANT CHANGE UP (ref 9–16)
ANISOCYTOSIS BLD QL: SIGNIFICANT CHANGE UP
APPEARANCE UR: CLEAR — SIGNIFICANT CHANGE UP
APTT BLD: 30.9 SEC — SIGNIFICANT CHANGE UP (ref 27.5–35.5)
AST SERPL-CCNC: 26 U/L — SIGNIFICANT CHANGE UP (ref 10–40)
AST SERPL-CCNC: 44 U/L — HIGH (ref 15–37)
BASOPHILS # BLD AUTO: 0 K/UL — SIGNIFICANT CHANGE UP (ref 0–0.2)
BASOPHILS NFR BLD AUTO: 0 % — SIGNIFICANT CHANGE UP (ref 0–2)
BILIRUB SERPL-MCNC: 0.7 MG/DL — SIGNIFICANT CHANGE UP (ref 0.2–1.2)
BILIRUB SERPL-MCNC: 0.8 MG/DL — SIGNIFICANT CHANGE UP (ref 0.2–1.2)
BILIRUB UR-MCNC: NEGATIVE — SIGNIFICANT CHANGE UP
BLD GP AB SCN SERPL QL: NEGATIVE — SIGNIFICANT CHANGE UP
BUN SERPL-MCNC: 20 MG/DL — SIGNIFICANT CHANGE UP (ref 7–23)
BUN SERPL-MCNC: 25 MG/DL — HIGH (ref 7–23)
CALCIUM SERPL-MCNC: 8.8 MG/DL — SIGNIFICANT CHANGE UP (ref 8.4–10.5)
CALCIUM SERPL-MCNC: 9 MG/DL — SIGNIFICANT CHANGE UP (ref 8.5–10.5)
CHLORIDE SERPL-SCNC: 105 MMOL/L — SIGNIFICANT CHANGE UP (ref 96–108)
CHLORIDE SERPL-SCNC: 106 MMOL/L — SIGNIFICANT CHANGE UP (ref 96–108)
CO2 SERPL-SCNC: 26 MMOL/L — SIGNIFICANT CHANGE UP (ref 22–31)
CO2 SERPL-SCNC: 28 MMOL/L — SIGNIFICANT CHANGE UP (ref 22–31)
COLOR SPEC: YELLOW — SIGNIFICANT CHANGE UP
COMMENT - URINE: SIGNIFICANT CHANGE UP
CREAT ?TM UR-MCNC: 10 MG/DL — SIGNIFICANT CHANGE UP
CREAT SERPL-MCNC: 0.91 MG/DL — SIGNIFICANT CHANGE UP (ref 0.5–1.3)
CREAT SERPL-MCNC: 0.93 MG/DL — SIGNIFICANT CHANGE UP (ref 0.5–1.3)
D DIMER BLD IA.RAPID-MCNC: 234 NG/ML DDU — HIGH
DACRYOCYTES BLD QL SMEAR: SLIGHT — SIGNIFICANT CHANGE UP
DIFF PNL FLD: NEGATIVE — SIGNIFICANT CHANGE UP
EGFR: 61 ML/MIN/1.73M2 — SIGNIFICANT CHANGE UP
EGFR: 62 ML/MIN/1.73M2 — SIGNIFICANT CHANGE UP
ELLIPTOCYTES BLD QL SMEAR: SLIGHT — SIGNIFICANT CHANGE UP
EOSINOPHIL # BLD AUTO: 0.06 K/UL — SIGNIFICANT CHANGE UP (ref 0–0.5)
EOSINOPHIL NFR BLD AUTO: 1 % — SIGNIFICANT CHANGE UP (ref 0–6)
EPI CELLS # UR: ABNORMAL /HPF (ref 0–5)
FLUAV AG NPH QL: SIGNIFICANT CHANGE UP
FLUBV AG NPH QL: SIGNIFICANT CHANGE UP
GLUCOSE SERPL-MCNC: 108 MG/DL — HIGH (ref 70–99)
GLUCOSE SERPL-MCNC: 122 MG/DL — HIGH (ref 70–99)
GLUCOSE UR QL: NEGATIVE — SIGNIFICANT CHANGE UP
HCT VFR BLD CALC: 33.2 % — LOW (ref 34.5–45)
HCT VFR BLD CALC: 37.4 % — SIGNIFICANT CHANGE UP (ref 34.5–45)
HGB BLD-MCNC: 10.7 G/DL — LOW (ref 11.5–15.5)
HGB BLD-MCNC: 12.1 G/DL — SIGNIFICANT CHANGE UP (ref 11.5–15.5)
HYALINE CASTS # UR AUTO: ABNORMAL /LPF (ref 0–2)
HYPOCHROMIA BLD QL: SLIGHT — SIGNIFICANT CHANGE UP
INR BLD: 1.15 — SIGNIFICANT CHANGE UP (ref 0.88–1.16)
KETONES UR-MCNC: NEGATIVE — SIGNIFICANT CHANGE UP
LACTATE SERPL-SCNC: 1.4 MMOL/L — SIGNIFICANT CHANGE UP (ref 0.4–2)
LEUKOCYTE ESTERASE UR-ACNC: NEGATIVE — SIGNIFICANT CHANGE UP
LYMPHOCYTES # BLD AUTO: 2.42 K/UL — SIGNIFICANT CHANGE UP (ref 1–3.3)
LYMPHOCYTES # BLD AUTO: 42 % — SIGNIFICANT CHANGE UP (ref 13–44)
MAGNESIUM SERPL-MCNC: 2 MG/DL — SIGNIFICANT CHANGE UP (ref 1.6–2.6)
MAGNESIUM SERPL-MCNC: 2.4 MG/DL — SIGNIFICANT CHANGE UP (ref 1.6–2.6)
MANUAL SMEAR VERIFICATION: SIGNIFICANT CHANGE UP
MCHC RBC-ENTMCNC: 31.8 PG — SIGNIFICANT CHANGE UP (ref 27–34)
MCHC RBC-ENTMCNC: 32 PG — SIGNIFICANT CHANGE UP (ref 27–34)
MCHC RBC-ENTMCNC: 32.2 GM/DL — SIGNIFICANT CHANGE UP (ref 32–36)
MCHC RBC-ENTMCNC: 32.4 GM/DL — SIGNIFICANT CHANGE UP (ref 32–36)
MCV RBC AUTO: 98.8 FL — SIGNIFICANT CHANGE UP (ref 80–100)
MCV RBC AUTO: 98.9 FL — SIGNIFICANT CHANGE UP (ref 80–100)
MONOCYTES # BLD AUTO: 0.4 K/UL — SIGNIFICANT CHANGE UP (ref 0–0.9)
MONOCYTES NFR BLD AUTO: 7 % — SIGNIFICANT CHANGE UP (ref 2–14)
NEUTROPHILS # BLD AUTO: 2.53 K/UL — SIGNIFICANT CHANGE UP (ref 1.8–7.4)
NEUTROPHILS NFR BLD AUTO: 44 % — SIGNIFICANT CHANGE UP (ref 43–77)
NITRITE UR-MCNC: NEGATIVE — SIGNIFICANT CHANGE UP
NRBC # BLD: 0 /100 WBCS — SIGNIFICANT CHANGE UP (ref 0–0)
NRBC # BLD: 0 /100 — SIGNIFICANT CHANGE UP (ref 0–0)
NRBC # BLD: SIGNIFICANT CHANGE UP /100 WBCS (ref 0–0)
NT-PROBNP SERPL-SCNC: 1100 PG/ML — HIGH
PCO2 BLDA: 49 MMHG — HIGH (ref 32–35)
PCO2 BLDV: 60 MMHG — HIGH (ref 39–42)
PH BLDA: 7.37 — SIGNIFICANT CHANGE UP (ref 7.35–7.45)
PH BLDV: 7.31 — LOW (ref 7.32–7.43)
PH UR: 5.5 — SIGNIFICANT CHANGE UP (ref 5–8)
PHOSPHATE SERPL-MCNC: 3.9 MG/DL — SIGNIFICANT CHANGE UP (ref 2.5–4.5)
PLAT MORPH BLD: NORMAL — SIGNIFICANT CHANGE UP
PLATELET # BLD AUTO: 153 K/UL — SIGNIFICANT CHANGE UP (ref 150–400)
PLATELET # BLD AUTO: 168 K/UL — SIGNIFICANT CHANGE UP (ref 150–400)
PO2 BLDA: 339 MMHG — HIGH (ref 83–108)
PO2 BLDV: 40 MMHG — SIGNIFICANT CHANGE UP (ref 25–45)
POLYCHROMASIA BLD QL SMEAR: SLIGHT — SIGNIFICANT CHANGE UP
POTASSIUM SERPL-MCNC: 4.3 MMOL/L — SIGNIFICANT CHANGE UP (ref 3.5–5.3)
POTASSIUM SERPL-MCNC: 5.1 MMOL/L — SIGNIFICANT CHANGE UP (ref 3.5–5.3)
POTASSIUM SERPL-SCNC: 4.3 MMOL/L — SIGNIFICANT CHANGE UP (ref 3.5–5.3)
POTASSIUM SERPL-SCNC: 5.1 MMOL/L — SIGNIFICANT CHANGE UP (ref 3.5–5.3)
PROT ?TM UR-MCNC: <4 MG/DL — SIGNIFICANT CHANGE UP (ref 0–12)
PROT SERPL-MCNC: 7.2 G/DL — SIGNIFICANT CHANGE UP (ref 6–8.3)
PROT SERPL-MCNC: 8.6 G/DL — HIGH (ref 6.4–8.2)
PROT UR-MCNC: >=300 MG/DL
PROT/CREAT UR-RTO: SIGNIFICANT CHANGE UP (ref 0–0.2)
PROTHROM AB SERPL-ACNC: 13.7 SEC — HIGH (ref 10.5–13.4)
RBC # BLD: 3.36 M/UL — LOW (ref 3.8–5.2)
RBC # BLD: 3.78 M/UL — LOW (ref 3.8–5.2)
RBC # FLD: 21.5 % — HIGH (ref 10.3–14.5)
RBC # FLD: 22 % — HIGH (ref 10.3–14.5)
RBC BLD AUTO: ABNORMAL
RBC CASTS # UR COMP ASSIST: < 5 /HPF — SIGNIFICANT CHANGE UP
RH IG SCN BLD-IMP: POSITIVE — SIGNIFICANT CHANGE UP
RSV RNA NPH QL NAA+NON-PROBE: SIGNIFICANT CHANGE UP
SAO2 % BLDA: 99.9 % — HIGH (ref 94–98)
SAO2 % BLDV: 59.9 % — LOW (ref 67–88)
SARS-COV-2 RNA SPEC QL NAA+PROBE: SIGNIFICANT CHANGE UP
SODIUM SERPL-SCNC: 142 MMOL/L — SIGNIFICANT CHANGE UP (ref 132–145)
SODIUM SERPL-SCNC: 143 MMOL/L — SIGNIFICANT CHANGE UP (ref 135–145)
SP GR SPEC: >=1.03 — SIGNIFICANT CHANGE UP (ref 1–1.03)
TROPONIN I, HIGH SENSITIVITY RESULT: 21.3 NG/L — SIGNIFICANT CHANGE UP
UROBILINOGEN FLD QL: 0.2 E.U./DL — SIGNIFICANT CHANGE UP
VARIANT LYMPHS # BLD: 6 % — SIGNIFICANT CHANGE UP (ref 0–6)
WBC # BLD: 5.76 K/UL — SIGNIFICANT CHANGE UP (ref 3.8–10.5)
WBC # BLD: 5.8 K/UL — SIGNIFICANT CHANGE UP (ref 3.8–10.5)
WBC # FLD AUTO: 5.76 K/UL — SIGNIFICANT CHANGE UP (ref 3.8–10.5)
WBC # FLD AUTO: 5.8 K/UL — SIGNIFICANT CHANGE UP (ref 3.8–10.5)
WBC UR QL: < 5 /HPF — SIGNIFICANT CHANGE UP

## 2022-07-09 PROCEDURE — 71045 X-RAY EXAM CHEST 1 VIEW: CPT | Mod: 26

## 2022-07-09 PROCEDURE — 99291 CRITICAL CARE FIRST HOUR: CPT

## 2022-07-09 PROCEDURE — 71045 X-RAY EXAM CHEST 1 VIEW: CPT | Mod: 26,77

## 2022-07-09 PROCEDURE — 99285 EMERGENCY DEPT VISIT HI MDM: CPT | Mod: 25

## 2022-07-09 PROCEDURE — 93306 TTE W/DOPPLER COMPLETE: CPT | Mod: 26

## 2022-07-09 RX ORDER — PANTOPRAZOLE SODIUM 20 MG/1
40 TABLET, DELAYED RELEASE ORAL
Refills: 0 | Status: DISCONTINUED | OUTPATIENT
Start: 2022-07-09 | End: 2022-07-12

## 2022-07-09 RX ORDER — NITROGLYCERIN 6.5 MG
0.4 CAPSULE, EXTENDED RELEASE ORAL ONCE
Refills: 0 | Status: COMPLETED | OUTPATIENT
Start: 2022-07-09 | End: 2022-07-09

## 2022-07-09 RX ORDER — SPIRONOLACTONE 25 MG/1
25 TABLET, FILM COATED ORAL EVERY 24 HOURS
Refills: 0 | Status: DISCONTINUED | OUTPATIENT
Start: 2022-07-09 | End: 2022-07-11

## 2022-07-09 RX ORDER — FUROSEMIDE 40 MG
40 TABLET ORAL EVERY 12 HOURS
Refills: 0 | Status: DISCONTINUED | OUTPATIENT
Start: 2022-07-10 | End: 2022-07-10

## 2022-07-09 RX ORDER — LEVOTHYROXINE SODIUM 125 MCG
137 TABLET ORAL EVERY 24 HOURS
Refills: 0 | Status: DISCONTINUED | OUTPATIENT
Start: 2022-07-10 | End: 2022-07-12

## 2022-07-09 RX ORDER — ESOMEPRAZOLE MAGNESIUM 40 MG/1
1 CAPSULE, DELAYED RELEASE ORAL
Qty: 0 | Refills: 0 | DISCHARGE

## 2022-07-09 RX ORDER — LEVOTHYROXINE SODIUM 125 MCG
1 TABLET ORAL
Qty: 0 | Refills: 0 | DISCHARGE

## 2022-07-09 RX ORDER — NYSTATIN CREAM 100000 [USP'U]/G
1 CREAM TOPICAL THREE TIMES A DAY
Refills: 0 | Status: DISCONTINUED | OUTPATIENT
Start: 2022-07-09 | End: 2022-07-12

## 2022-07-09 RX ORDER — SPIRONOLACTONE 25 MG/1
25 TABLET, FILM COATED ORAL EVERY 24 HOURS
Refills: 0 | Status: DISCONTINUED | OUTPATIENT
Start: 2022-07-09 | End: 2022-07-09

## 2022-07-09 RX ORDER — ENOXAPARIN SODIUM 100 MG/ML
40 INJECTION SUBCUTANEOUS EVERY 12 HOURS
Refills: 0 | Status: DISCONTINUED | OUTPATIENT
Start: 2022-07-09 | End: 2022-07-12

## 2022-07-09 RX ORDER — FUROSEMIDE 40 MG
40 TABLET ORAL ONCE
Refills: 0 | Status: COMPLETED | OUTPATIENT
Start: 2022-07-09 | End: 2022-07-09

## 2022-07-09 RX ORDER — ASPIRIN/CALCIUM CARB/MAGNESIUM 324 MG
162 TABLET ORAL ONCE
Refills: 0 | Status: COMPLETED | OUTPATIENT
Start: 2022-07-09 | End: 2022-07-09

## 2022-07-09 RX ORDER — SACUBITRIL AND VALSARTAN 24; 26 MG/1; MG/1
1 TABLET, FILM COATED ORAL EVERY 12 HOURS
Refills: 0 | Status: DISCONTINUED | OUTPATIENT
Start: 2022-07-09 | End: 2022-07-11

## 2022-07-09 RX ADMIN — Medication 40 MILLIGRAM(S): at 08:45

## 2022-07-09 RX ADMIN — NYSTATIN CREAM 1 APPLICATION(S): 100000 CREAM TOPICAL at 21:51

## 2022-07-09 RX ADMIN — ENOXAPARIN SODIUM 40 MILLIGRAM(S): 100 INJECTION SUBCUTANEOUS at 21:51

## 2022-07-09 RX ADMIN — Medication 162 MILLIGRAM(S): at 08:45

## 2022-07-09 RX ADMIN — PANTOPRAZOLE SODIUM 40 MILLIGRAM(S): 20 TABLET, DELAYED RELEASE ORAL at 17:55

## 2022-07-09 RX ADMIN — SACUBITRIL AND VALSARTAN 1 TABLET(S): 24; 26 TABLET, FILM COATED ORAL at 17:52

## 2022-07-09 RX ADMIN — Medication 0.4 MILLIGRAM(S): at 08:45

## 2022-07-09 RX ADMIN — SPIRONOLACTONE 25 MILLIGRAM(S): 25 TABLET, FILM COATED ORAL at 17:54

## 2022-07-09 RX ADMIN — NYSTATIN CREAM 1 APPLICATION(S): 100000 CREAM TOPICAL at 17:56

## 2022-07-09 RX ADMIN — Medication 40 MILLIGRAM(S): at 15:30

## 2022-07-09 NOTE — ED ADULT NURSE NOTE - NSIMPLEMENTINTERV_GEN_ALL_ED
Implemented All Fall Risk Interventions:  [place] to call system. Call bell, personal items and telephone within reach. Instruct patient to call for assistance. Room bathroom lighting operational. Non-slip footwear when patient is off stretcher. Physically safe environment: no spills, clutter or unnecessary equipment. Stretcher in lowest position, wheels locked, appropriate side rails in place. Provide visual cue, wrist band, yellow gown, etc. Monitor gait and stability. Monitor for mental status changes and reorient to person, place, and time. Review medications for side effects contributing to fall risk. Reinforce activity limits and safety measures with patient and family.

## 2022-07-09 NOTE — H&P ADULT - NSHPSOCIALHISTORY_GEN_ALL_CORE
[facility].  Denies drug use. Has not smoked tobacco for over 40 years. Reports occasional alcohol use.

## 2022-07-09 NOTE — H&P ADULT - NSHPLABSRESULTS_GEN_ALL_CORE
12.1   5.76  )-----------( 168      ( 09 Jul 2022 08:33 )             37.4       07-09    142  |  106  |  25<H>  ----------------------------<  122<H>  5.1   |  26  |  0.91    Ca    9.0      09 Jul 2022 08:33  Mg     2.4     07-09    TPro  8.6<H>  /  Alb  4.4  /  TBili  0.8  /  DBili  x   /  AST  44<H>  /  ALT  36  /  AlkPhos  68  07-09          ABG - ( 09 Jul 2022 10:21 )  pH, Arterial: 7.37  pH, Blood: x     /  pCO2: 49    /  pO2: 339   / HCO3: x     / Base Excess: x     /  SaO2: 99.9                Urinalysis Basic - ( 09 Jul 2022 08:44 )    Color: Yellow / Appearance: Clear / SG: >=1.030 / pH: x  Gluc: x / Ketone: NEGATIVE  / Bili: NEGATIVE / Urobili: 0.2 E.U./dL   Blood: x / Protein: >=300 mg/dL / Nitrite: NEGATIVE   Leuk Esterase: NEGATIVE / RBC: < 5 /HPF / WBC < 5 /HPF   Sq Epi: x / Non Sq Epi: 5-10 /HPF / Bacteria: x            Lactate Trend  07-09 @ 08:33 Lactate:1.4             CAPILLARY BLOOD GLUCOSE      POCT Blood Glucose.: 106 mg/dL (09 Jul 2022 08:19)

## 2022-07-09 NOTE — H&P ADULT - HISTORY OF PRESENT ILLNESS
HPI:     In the ED:  Initial vital signs: T: 97.7 F, HR: 166, BP: 166/87, R: 29, SpO2: 90% on RA initially, desatted to 64%  ED course: Patient was given sublingual nitro x1; lasix 40mg IV push with subsequent UO of 600ml; started on BIPAP 10/5/18/80% with improvement in SOB, FiO2 down to 40% with pt comfortably talking  Labs: significant for D-dimer 234, BNP 1100, urine protein >300 + hyaline casts, arterial pCO2 49/pO2 339  Imaging:  CXR: Congestion and/or infiltrates. Cardiomegaly  Medications: nitro 0.4 sl, lasix 40 IV push HPI: Patient woke up this morning around 6am with shortness of breath as soon as she sat up in bed. Reports that she could not catch her breath to speak and was audibly wheezing. States that she has never had this happen before. Denies chest pain, dizziness, N/V, recent fevers/chills. Reports that her legs are "always swollen". No other symptoms. States that she came to the ED via ambulance and felt better once put on oxygen. Currently, she states she is feeling much better and able to talk without difficulty.     In the ED:  Initial vital signs: T: 97.7 F, HR: 166, BP: 166/87, R: 29, SpO2: 90% on RA initially, desatted to 64%  ED course: Patient was given sublingual nitro x1; lasix 40mg IV push with subsequent UO of 600ml; started on BIPAP 10/5/18/80% with improvement in SOB, FiO2 down to 40% with pt comfortably talking  Labs: significant for D-dimer 234, BNP 1100, urine protein >300 + hyaline casts, arterial pCO2 49/pO2 339  Imaging:  CXR: Congestion and/or infiltrates. Cardiomegaly  Medications: nitro 0.4 sl, lasix 40 IV push HPI: Patient woke up this morning around 6am with shortness of breath as soon as she sat up in bed. Reports that she could not catch her breath to speak and was audibly wheezing. States that she has never had this happen before. Denies chest pain, dizziness, N/V, recent fevers/chills. Reports that her legs are "always swollen". No other symptoms. States that she came to the ED via ambulance and felt better once put on oxygen. Currently, she states she is feeling much better and able to talk without difficulty. Per care navigator, patient is deconditioned from staying in bed and decreased activity. Also notes that patient has avoided leaving her room and keeping up with doctor appts due to anxiety about COVID exposure.    In the ED:  Initial vital signs: T: 97.7 F, HR: 166, BP: 166/87, R: 29, SpO2: 90% on RA initially, desatted to 64%  ED course: Patient was given sublingual nitro x1; lasix 40mg IV push with subsequent UO of 600ml; started on BIPAP 10/5/18/80% with improvement in SOB, FiO2 down to 40% with pt comfortably talking  Labs: significant for D-dimer 234, BNP 1100, urine protein >300 + hyaline casts, arterial pCO2 49/pO2 339  Imaging:  CXR: Congestion and/or infiltrates. Cardiomegaly  Medications: nitro 0.4 sl, lasix 40 IV push

## 2022-07-09 NOTE — ED PROVIDER NOTE - PROGRESS NOTE DETAILS
clinically improved appears comfortable speaking full sentences  progressive improvement on bibap  10/5/18/80%  156/73 91 02sat 99%  600 ml urine output leaving ER with EMS - appears comfortable   on bibap  10/5/18/50%

## 2022-07-09 NOTE — ED ADULT NURSE NOTE - OBJECTIVE STATEMENT
BIBA for respiratory distress. as per pt, c/o sudden on set of SOB with difficulty breathing and anxiety starting this morning, states she was fine overnight and had no problems. pt denies CP, HA, dizziness. pt upgraded due to acuity and presentation. pt placed on telemetry, team at bedside. will continue to monitor. pt unable to speak in full sentences and unable to tolerate NRB due to claustrophobia. arrives with 18g LAC and given SL nitro. BIBA for respiratory distress. as per pt, c/o sudden on set of SOB with difficulty breathing and anxiety starting this morning, states she was fine overnight and had no problems. pt denies CP, HA, dizziness. pt upgraded due to acuity and presentation. pt placed on telemetry, team at bedside. will continue to monitor. pt unable to speak in full sentences and unable to tolerate NRB due to claustrophobia. arrives with 18g LAC and given SL nitro. pt 64% O2 on RA, started on BiPAP.

## 2022-07-09 NOTE — H&P ADULT - ASSESSMENT
ASSESSMENT: Patient is a 85yo F with PMHx of rheumatoid arthritis, hypothyroidism, hypertension BIBA to the ED with shortness of breath starting this morning.  ASSESSMENT: Patient is a 87yo F with PMHx of rheumatoid arthritis, hypothyroidism, hypertension BIBA to the ED with shortness of breath starting this morning likely 2/2 to CHF exacerbation.    PLAN    NEUROLOGY  No active issues    CARDIOLOGY  # Acute CHF exacerbation  Pt presented with SOB and hypertension. O2 sat 90 (lowest 64) in ED. Home meds losartan 25 qd, lasix 40 qd.  - BNP 1100  - Bipap 10/5/18/40% currently; wean as appropriate  - s/p lasix 40 IV push in ED  - start lasix 40 IV BID   - stop losartan; start entresto 49/51 tomorrow  - start spironolactone 25 tomorrow  - monitor weights, strict I/Os  - trend labs   - lipid panel, A1c; f/u    # Hypertension  Pt takes losartan 25 qd and lasix 40 qd at home.  - plan as above    RESPIRATORY  # Acute hypoxic respiratory failure likely 2/2 CHF exacerbation  - plan as above    GASTROINTESTINAL  No active issues    RENAL  No active issues      # Kidd in place  - monitor I/Os    ENDOCRINE  # Hypothyroidism  - c/w synthroid 137mcg  - TSH, f/u    HEME/ONC   No active issues    INFECTIOUS DISEASE  No active issues    RHEUMATOLOGY  # Rheumatoid arthritis  Patient does not currently take any home meds. Controlled.  - monitor    DERMATOLOGY  # Candidiasis  - start nystatin powder    MISCELLANEOUS  F: none  E: replete prn  N: DASH, fluid restriction 1.5L  DVT PPx: lovenox  GI PPx: protonix   Dispo: CCU  Code: FULL CODE   ASSESSMENT: Patient is a 85yo F with PMHx of rheumatoid arthritis, hypothyroidism, hypertension BIBA to the ED with shortness of breath starting this morning likely 2/2 to CHF exacerbation.    PLAN    NEUROLOGY  No active issues    CARDIOLOGY  # Acute CHF exacerbation  Pt presented with SOB and hypertension. Likely underlying HFpEF and HTN leading to flash pulmonary edema. O2 sat 90 (lowest 64) in ED. Home meds losartan 25 qd, lasix 40 qd.   - BNP 1100  - Bipap 10/5/18/40% currently; wean as appropriate  - s/p lasix 40 IV push in ED  - CXR on admission shows congestion and/or infiltrates, cardiomegaly  - TTE pending, f/u  - start lasix 40 IV BID   - stop losartan; start entresto 49/51 tomorrow  - start spironolactone 25 tomorrow  - monitor weights, strict I/Os  - trend labs   - lipid panel, A1c; f/u  - PT/OT eval for decondition    # Hypertension  Pt takes losartan 25 qd and lasix 40 qd at home.  - plan as above    RESPIRATORY  # Acute hypoxic respiratory failure likely 2/2 CHF exacerbation  - plan as above    GASTROINTESTINAL  No active issues    RENAL  No active issues      # Kidd in place  - monitor I/Os    ENDOCRINE  # Hypothyroidism  - c/w synthroid 137mcg  - TSH, f/u    HEME/ONC   No active issues    INFECTIOUS DISEASE  No active issues    RHEUMATOLOGY  # Rheumatoid arthritis  Patient does not currently take any home meds. Controlled.  - monitor    DERMATOLOGY  # Candidiasis  - start nystatin powder    MISCELLANEOUS  F: none  E: replete prn  N: DASH, fluid restriction 1.5L  DVT PPx: lovenox  GI PPx: protonix   Dispo: CCU  Code: FULL CODE   ASSESSMENT: Patient is a 87yo F with PMHx of rheumatoid arthritis, hypothyroidism, hypertension BIBA to the ED with shortness of breath starting this morning likely 2/2 to CHF exacerbation.    PLAN    NEUROLOGY  No active issues    CARDIOLOGY  # Acute CHF exacerbation  Pt presented with SOB and hypertension. Likely underlying HFpEF and HTN leading to flash pulmonary edema. O2 sat 90 (lowest 64) in ED. Home meds losartan 25 qd, lasix 40 qd.   - BNP 1100  - Bipap 10/5/18/40% currently; wean as appropriate  - s/p lasix 40 IV push in ED  - CXR on admission shows congestion and/or infiltrates, cardiomegaly  - bedside TTE: preserved EDF, mild LVH, mild-mod MR  - TTE pending, f/u  - start lasix 40 IV BID   - stop losartan; start entresto 49/51 tomorrow  - start spironolactone 25 tomorrow  - monitor weights, strict I/Os  - trend labs   - lipid panel, A1c; f/u  - PT/OT eval for decondition    # Hypertension  Pt takes losartan 25 qd and lasix 40 qd at home.  - plan as above    RESPIRATORY  # Acute hypoxic respiratory failure likely 2/2 CHF exacerbation  - plan as above    GASTROINTESTINAL  No active issues    RENAL  No active issues      # Kidd in place  - monitor I/Os    ENDOCRINE  # Hypothyroidism  - c/w synthroid 137mcg  - TSH, f/u    HEME/ONC   No active issues    INFECTIOUS DISEASE  No active issues    RHEUMATOLOGY  # Rheumatoid arthritis  Patient does not currently take any home meds. Controlled.  - monitor    DERMATOLOGY  # Candidiasis  - start nystatin powder    MISCELLANEOUS  F: none  E: replete prn  N: DASH, fluid restriction 1.5L  DVT PPx: lovenox  GI PPx: protonix   Dispo: CCU  Code: FULL CODE   ASSESSMENT: Patient is a 87yo F with PMHx of rheumatoid arthritis, hypothyroidism, hypertension BIBA to the ED with shortness of breath starting this morning likely 2/2 to CHF exacerbation.    PLAN    NEUROLOGY  No active issues    CARDIOLOGY  # Acute CHF exacerbation  Pt presented with SOB and hypertension. Likely underlying HFpEF and HTN leading to flash pulmonary edema. O2 sat 90 (lowest 64) in ED. Home meds losartan 25 qd, lasix 40 qd.   - BNP 1100  - Bipap 10/5/18/40% currently; wean as appropriate  - s/p lasix 40 IV push in ED  - CXR on admission shows congestion and/or infiltrates, cardiomegaly  - bedside TTE: preserved EDF, RV dilation, mild-mod MR, severe TR  - TTE pending, f/u  - start lasix 40 IV BID   - stop losartan; start entresto 49/51 tomorrow  - start spironolactone 25 tomorrow  - monitor weights, strict I/Os  - trend labs   - lipid panel, A1c; f/u  - PT/OT eval for decondition    # Hypertension  Pt takes losartan 25 qd and lasix 40 qd at home.  - plan as above    RESPIRATORY  # Acute hypoxic respiratory failure likely 2/2 CHF exacerbation  - plan as above    GASTROINTESTINAL  No active issues    RENAL  No active issues      # Kidd in place  - monitor I/Os    ENDOCRINE  # Hypothyroidism  - c/w synthroid 137mcg  - TSH, f/u    HEME/ONC   No active issues    INFECTIOUS DISEASE  No active issues    RHEUMATOLOGY  # Rheumatoid arthritis  Patient does not currently take any home meds. Controlled.  - monitor    DERMATOLOGY  # Candidiasis  - start nystatin powder    MISCELLANEOUS  F: none  E: replete prn  N: DASH, fluid restriction 1.5L  DVT PPx: lovenox  GI PPx: protonix   Dispo: CCU  Code: FULL CODE   ASSESSMENT: Patient is a 85yo F with PMHx of rheumatoid arthritis, hypothyroidism, hypertension BIBA to the ED with shortness of breath starting this morning likely 2/2 to CHF exacerbation.    PLAN    NEUROLOGY  No active issues    CARDIOLOGY  # Acute CHF exacerbation  Pt presented with SOB and hypertension. Likely underlying HFpEF and HTN leading to flash pulmonary edema. O2 sat 90 (lowest 64) in ED. Home meds losartan 25 qd, lasix 40 qd.   - BNP 1100  - Bipap 10/5/18/40% currently; wean as appropriate  - s/p lasix 40 IV push in ED  - CXR on admission shows congestion and/or infiltrates, cardiomegaly  - bedside TTE: preserved EF, mod MR, mild-mod TR, pulmHTN w/ pulmonary artery systolic pressure is 59 mmH  - TTE pending, f/u  - start lasix 40 IV BID   - stop losartan; start entresto 49/51 tomorrow  - start spironolactone 25 tomorrow  - monitor weights, strict I/Os  - trend labs   - lipid panel, A1c; f/u  - PT/OT eval for decondition    # Hypertension  Pt takes losartan 25 qd and lasix 40 qd at home.  - plan as above    RESPIRATORY  # Acute hypoxic respiratory failure likely 2/2 CHF exacerbation  - plan as above    GASTROINTESTINAL  No active issues    RENAL  No active issues      # Kidd in place  - monitor I/Os    ENDOCRINE  # Hypothyroidism  - c/w synthroid 137mcg  - TSH, f/u    HEME/ONC   No active issues    INFECTIOUS DISEASE  No active issues    RHEUMATOLOGY  # Rheumatoid arthritis  Patient does not currently take any home meds. Controlled.  - monitor    DERMATOLOGY  # Candidiasis  - start nystatin powder    MISCELLANEOUS  F: none  E: replete prn  N: DASH, fluid restriction 1.5L  DVT PPx: lovenox  GI PPx: protonix   Dispo: CCU  Code: FULL CODE   ASSESSMENT: Patient is a 87yo F with PMHx of rheumatoid arthritis, hypothyroidism, hypertension BIBA to the ED with shortness of breath starting this morning likely 2/2 to CHF exacerbation.    PLAN    NEUROLOGY  No active issues    CARDIOLOGY  # Acute CHF exacerbation  Pt presented with SOB and hypertension. Likely underlying HFpEF and HTN leading to flash pulmonary edema. O2 sat 90 (lowest 64) in ED. Home meds losartan 25 qd, lasix 40 qd.   - BNP 1100  - Bipap 10/5/18/40%, wean as appropriate  - s/p lasix 40 IV push in ED  - CXR on admission shows congestion and/or infiltrates, cardiomegaly  - bedside TTE: preserved EF, mod MR, mild-mod TR, pulmHTN w/ pulmonary artery systolic pressure is 59 mmH  - TTE pending, f/u  - start lasix 40 IV BID   - start entresto 49/51   - start spironolactone 25   - monitor weights, strict I/Os  - trend labs   - lipid panel, A1c; f/u  - PT/OT eval for decondition    # Hypertension  Pt takes losartan 25 qd and lasix 40 qd at home.  - plan as above    RESPIRATORY  # Acute hypoxic respiratory failure likely 2/2 CHF exacerbation  - plan as above    GASTROINTESTINAL  No active issues    RENAL  No active issues      # Kidd in place  - monitor I/Os    ENDOCRINE  # Hypothyroidism  - c/w synthroid 137mcg  - TSH, f/u    HEME/ONC   No active issues    INFECTIOUS DISEASE  No active issues    RHEUMATOLOGY  # Rheumatoid arthritis  Patient does not currently take any home meds. Controlled.  - monitor    DERMATOLOGY  # Candidiasis  - start nystatin powder    MISCELLANEOUS  F: none  E: replete prn  N: DASH, fluid restriction 1.5L  DVT PPx: lovenox  GI PPx: protonix   Dispo: CCU  Code: FULL CODE

## 2022-07-09 NOTE — ED PROVIDER NOTE - CLINICAL SUMMARY MEDICAL DECISION MAKING FREE TEXT BOX
progressive improvement throughout ed stay   admit CCU for new onset CHF  pt appears greatly improved.   will administer additional 20mg lasix and decrease 02 to 40%

## 2022-07-09 NOTE — H&P ADULT - NSHPPHYSICALEXAM_GEN_ALL_CORE
.  VITAL SIGNS:  T(C): 36.5 (07-09-22 @ 08:14), Max: 36.5 (07-09-22 @ 08:14)  T(F): 97.7 (07-09-22 @ 08:14), Max: 97.7 (07-09-22 @ 08:14)  HR: 96 (07-09-22 @ 11:08) (94 - 166)  BP: 142/78 (07-09-22 @ 11:08) (142/78 - 192/90)  BP(mean): --  RR: 24 (07-09-22 @ 11:08) (24 - 29)  SpO2: 99% (07-09-22 @ 11:08) (90% - 100%)  Wt(kg): --    PHYSICAL EXAM:    Constitutional: resting comfortably in bed; NAD  Head: NC/AT  Eyes: PERRL, EOMI, anicteric sclera  ENT: no nasal discharge; uvula midline, no oropharyngeal erythema or exudates; MMM  Neck: supple; no JVD or thyromegaly  Respiratory: CTA B/L; no W/R/R, no retractions  Cardiac: +S1/S2; RRR; no M/R/G; PMI non-displaced  Gastrointestinal: abdomen soft, NT/ND; no rebound or guarding; +BSx4  Back: spine midline, no bony tenderness or step-offs; no CVAT B/L  Extremities: WWP, no clubbing or cyanosis; no peripheral edema  Musculoskeletal: NROM x4; no joint swelling, tenderness or erythema  Vascular: 2+ radial, DP/PT pulses B/L  Dermatologic: skin warm, dry and intact; no rashes, wounds, or scars  Lymphatic: no submandibular or cervical LAD  Neurologic: AAOx3; CNII-XII grossly intact; no focal deficits  Psychiatric: affect and characteristics of appearance, verbalizations, behaviors are appropriate .  VITAL SIGNS:  T(C): 36.5 (07-09-22 @ 08:14), Max: 36.5 (07-09-22 @ 08:14)  T(F): 97.7 (07-09-22 @ 08:14), Max: 97.7 (07-09-22 @ 08:14)  HR: 96 (07-09-22 @ 11:08) (94 - 166)  BP: 142/78 (07-09-22 @ 11:08) (142/78 - 192/90)  BP(mean): --  RR: 24 (07-09-22 @ 11:08) (24 - 29)  SpO2: 99% (07-09-22 @ 11:08) (90% - 100%)  Wt(kg): --    PHYSICAL EXAM:    Constitutional: resting comfortably in bed; NAD  Head: NC/AT  Eyes: PERRL, EOMI, anicteric sclera  Neck: supple; no JVD or thyromegaly  Respiratory: bilateral crackles B/L; mild wheezing, no retractions  Cardiac: +S1/S2; RRR; no M/R/G  Gastrointestinal: abdomen soft, NT/ND; no rebound or guarding; +BSx4  Extremities: WWP, no clubbing or cyanosis; B/L LE edema  Vascular: 2+ radial, DP/PT pulses B/L  Dermatologic: skin warm, dry and intact; candidiasis in skin folds  Lymphatic: no submandibular or cervical LAD  Neurologic: AAOx3; CNII-XII grossly intact; no focal deficits  Psychiatric: affect and characteristics of appearance, verbalizations, behaviors are appropriate

## 2022-07-09 NOTE — ED PROVIDER NOTE - OBJECTIVE STATEMENT
86y F with PMHx rheumatoid arthritis, hypothyroidism (on Synthroid 137mg 5 days/week), fluid retention (on furosemide), takes losartan, not on anticoagulants BIBA today for slowly progressive shortness of breath x several days. trige: here for sob since 6am - denies cp, dizziness- was given Nitro 0.4 SL and 18 gauge right AC    86y F with PMHx rheumatoid arthritis, hypothyroidism, fluid retention (on furosemide), takes losartan, not on anticoagulants BIBA today for shortness of breath since this morning. trige: here for sob since 6am - denies cp, dizziness- was given Nitro 0.4 SL and 18 gauge right AC    86y F with PMHx rheumatoid arthritis, hypothyroidism, fluid retention (on furosemide) [facility] - pt with complaint of shortness of breath since this morning. denies chest/neck/jaw/arm or back pain. denies prior history of congestive heart failure.   no ha no fever no n/v/d

## 2022-07-10 DIAGNOSIS — Z29.9 ENCOUNTER FOR PROPHYLACTIC MEASURES, UNSPECIFIED: ICD-10-CM

## 2022-07-10 DIAGNOSIS — M06.9 RHEUMATOID ARTHRITIS, UNSPECIFIED: ICD-10-CM

## 2022-07-10 DIAGNOSIS — I50.33 ACUTE ON CHRONIC DIASTOLIC (CONGESTIVE) HEART FAILURE: ICD-10-CM

## 2022-07-10 DIAGNOSIS — I10 ESSENTIAL (PRIMARY) HYPERTENSION: ICD-10-CM

## 2022-07-10 DIAGNOSIS — E03.9 HYPOTHYROIDISM, UNSPECIFIED: ICD-10-CM

## 2022-07-10 LAB
A1C WITH ESTIMATED AVERAGE GLUCOSE RESULT: 5.7 % — HIGH (ref 4–5.6)
ALBUMIN SERPL ELPH-MCNC: 4 G/DL — SIGNIFICANT CHANGE UP (ref 3.3–5)
ALP SERPL-CCNC: 50 U/L — SIGNIFICANT CHANGE UP (ref 40–120)
ALT FLD-CCNC: 15 U/L — SIGNIFICANT CHANGE UP (ref 10–45)
ANION GAP SERPL CALC-SCNC: 10 MMOL/L — SIGNIFICANT CHANGE UP (ref 5–17)
AST SERPL-CCNC: 28 U/L — SIGNIFICANT CHANGE UP (ref 10–40)
BASOPHILS # BLD AUTO: 0.02 K/UL — SIGNIFICANT CHANGE UP (ref 0–0.2)
BASOPHILS NFR BLD AUTO: 0.4 % — SIGNIFICANT CHANGE UP (ref 0–2)
BILIRUB SERPL-MCNC: 0.8 MG/DL — SIGNIFICANT CHANGE UP (ref 0.2–1.2)
BLD GP AB SCN SERPL QL: NEGATIVE — SIGNIFICANT CHANGE UP
BUN SERPL-MCNC: 23 MG/DL — SIGNIFICANT CHANGE UP (ref 7–23)
CALCIUM SERPL-MCNC: 8.5 MG/DL — SIGNIFICANT CHANGE UP (ref 8.4–10.5)
CHLORIDE SERPL-SCNC: 107 MMOL/L — SIGNIFICANT CHANGE UP (ref 96–108)
CHOLEST SERPL-MCNC: 97 MG/DL — SIGNIFICANT CHANGE UP
CO2 SERPL-SCNC: 29 MMOL/L — SIGNIFICANT CHANGE UP (ref 22–31)
CREAT SERPL-MCNC: 0.94 MG/DL — SIGNIFICANT CHANGE UP (ref 0.5–1.3)
EGFR: 60 ML/MIN/1.73M2 — SIGNIFICANT CHANGE UP
EOSINOPHIL # BLD AUTO: 0.06 K/UL — SIGNIFICANT CHANGE UP (ref 0–0.5)
EOSINOPHIL NFR BLD AUTO: 1.3 % — SIGNIFICANT CHANGE UP (ref 0–6)
ESTIMATED AVERAGE GLUCOSE: 117 MG/DL — HIGH (ref 68–114)
GLUCOSE SERPL-MCNC: 89 MG/DL — SIGNIFICANT CHANGE UP (ref 70–99)
HCT VFR BLD CALC: 30.9 % — LOW (ref 34.5–45)
HDLC SERPL-MCNC: 56 MG/DL — SIGNIFICANT CHANGE UP
HGB BLD-MCNC: 9.9 G/DL — LOW (ref 11.5–15.5)
IMM GRANULOCYTES NFR BLD AUTO: 1.3 % — SIGNIFICANT CHANGE UP (ref 0–1.5)
LIPID PNL WITH DIRECT LDL SERPL: 28 MG/DL — SIGNIFICANT CHANGE UP
LYMPHOCYTES # BLD AUTO: 1.32 K/UL — SIGNIFICANT CHANGE UP (ref 1–3.3)
LYMPHOCYTES # BLD AUTO: 27.8 % — SIGNIFICANT CHANGE UP (ref 13–44)
MAGNESIUM SERPL-MCNC: 2 MG/DL — SIGNIFICANT CHANGE UP (ref 1.6–2.6)
MCHC RBC-ENTMCNC: 31.7 PG — SIGNIFICANT CHANGE UP (ref 27–34)
MCHC RBC-ENTMCNC: 32 GM/DL — SIGNIFICANT CHANGE UP (ref 32–36)
MCV RBC AUTO: 99 FL — SIGNIFICANT CHANGE UP (ref 80–100)
MONOCYTES # BLD AUTO: 0.62 K/UL — SIGNIFICANT CHANGE UP (ref 0–0.9)
MONOCYTES NFR BLD AUTO: 13.1 % — SIGNIFICANT CHANGE UP (ref 2–14)
NEUTROPHILS # BLD AUTO: 2.67 K/UL — SIGNIFICANT CHANGE UP (ref 1.8–7.4)
NEUTROPHILS NFR BLD AUTO: 56.1 % — SIGNIFICANT CHANGE UP (ref 43–77)
NON HDL CHOLESTEROL: 41 MG/DL — SIGNIFICANT CHANGE UP
NRBC # BLD: 0 /100 WBCS — SIGNIFICANT CHANGE UP (ref 0–0)
PHOSPHATE SERPL-MCNC: 3.1 MG/DL — SIGNIFICANT CHANGE UP (ref 2.5–4.5)
PLATELET # BLD AUTO: 155 K/UL — SIGNIFICANT CHANGE UP (ref 150–400)
POTASSIUM SERPL-MCNC: 4.3 MMOL/L — SIGNIFICANT CHANGE UP (ref 3.5–5.3)
POTASSIUM SERPL-SCNC: 4.3 MMOL/L — SIGNIFICANT CHANGE UP (ref 3.5–5.3)
PROT SERPL-MCNC: 6.5 G/DL — SIGNIFICANT CHANGE UP (ref 6–8.3)
RBC # BLD: 3.12 M/UL — LOW (ref 3.8–5.2)
RBC # FLD: 21.2 % — HIGH (ref 10.3–14.5)
RH IG SCN BLD-IMP: POSITIVE — SIGNIFICANT CHANGE UP
SODIUM SERPL-SCNC: 146 MMOL/L — HIGH (ref 135–145)
TRIGL SERPL-MCNC: 63 MG/DL — SIGNIFICANT CHANGE UP
TSH SERPL-MCNC: 2.15 UIU/ML — SIGNIFICANT CHANGE UP (ref 0.27–4.2)
WBC # BLD: 4.75 K/UL — SIGNIFICANT CHANGE UP (ref 3.8–10.5)
WBC # FLD AUTO: 4.75 K/UL — SIGNIFICANT CHANGE UP (ref 3.8–10.5)

## 2022-07-10 PROCEDURE — 93308 TTE F-UP OR LMTD: CPT | Mod: 26

## 2022-07-10 PROCEDURE — 71045 X-RAY EXAM CHEST 1 VIEW: CPT | Mod: 26

## 2022-07-10 PROCEDURE — 99233 SBSQ HOSP IP/OBS HIGH 50: CPT

## 2022-07-10 PROCEDURE — 93010 ELECTROCARDIOGRAM REPORT: CPT

## 2022-07-10 PROCEDURE — 99291 CRITICAL CARE FIRST HOUR: CPT

## 2022-07-10 RX ORDER — FUROSEMIDE 40 MG
40 TABLET ORAL EVERY 12 HOURS
Refills: 0 | Status: DISCONTINUED | OUTPATIENT
Start: 2022-07-10 | End: 2022-07-11

## 2022-07-10 RX ORDER — DAPAGLIFLOZIN 10 MG/1
10 TABLET, FILM COATED ORAL EVERY 24 HOURS
Refills: 0 | Status: DISCONTINUED | OUTPATIENT
Start: 2022-07-10 | End: 2022-07-12

## 2022-07-10 RX ORDER — POLYETHYLENE GLYCOL 3350 17 G/17G
17 POWDER, FOR SOLUTION ORAL EVERY 24 HOURS
Refills: 0 | Status: DISCONTINUED | OUTPATIENT
Start: 2022-07-10 | End: 2022-07-12

## 2022-07-10 RX ORDER — SENNA PLUS 8.6 MG/1
2 TABLET ORAL AT BEDTIME
Refills: 0 | Status: DISCONTINUED | OUTPATIENT
Start: 2022-07-10 | End: 2022-07-12

## 2022-07-10 RX ADMIN — Medication 137 MICROGRAM(S): at 06:24

## 2022-07-10 RX ADMIN — SACUBITRIL AND VALSARTAN 1 TABLET(S): 24; 26 TABLET, FILM COATED ORAL at 06:24

## 2022-07-10 RX ADMIN — SPIRONOLACTONE 25 MILLIGRAM(S): 25 TABLET, FILM COATED ORAL at 17:23

## 2022-07-10 RX ADMIN — Medication 40 MILLIGRAM(S): at 09:05

## 2022-07-10 RX ADMIN — Medication 40 MILLIGRAM(S): at 18:31

## 2022-07-10 RX ADMIN — DAPAGLIFLOZIN 10 MILLIGRAM(S): 10 TABLET, FILM COATED ORAL at 18:31

## 2022-07-10 RX ADMIN — POLYETHYLENE GLYCOL 3350 17 GRAM(S): 17 POWDER, FOR SOLUTION ORAL at 17:20

## 2022-07-10 RX ADMIN — SENNA PLUS 2 TABLET(S): 8.6 TABLET ORAL at 21:20

## 2022-07-10 RX ADMIN — ENOXAPARIN SODIUM 40 MILLIGRAM(S): 100 INJECTION SUBCUTANEOUS at 09:04

## 2022-07-10 RX ADMIN — NYSTATIN CREAM 1 APPLICATION(S): 100000 CREAM TOPICAL at 13:31

## 2022-07-10 RX ADMIN — SACUBITRIL AND VALSARTAN 1 TABLET(S): 24; 26 TABLET, FILM COATED ORAL at 17:23

## 2022-07-10 RX ADMIN — PANTOPRAZOLE SODIUM 40 MILLIGRAM(S): 20 TABLET, DELAYED RELEASE ORAL at 06:24

## 2022-07-10 RX ADMIN — NYSTATIN CREAM 1 APPLICATION(S): 100000 CREAM TOPICAL at 21:30

## 2022-07-10 RX ADMIN — NYSTATIN CREAM 1 APPLICATION(S): 100000 CREAM TOPICAL at 08:08

## 2022-07-10 RX ADMIN — ENOXAPARIN SODIUM 40 MILLIGRAM(S): 100 INJECTION SUBCUTANEOUS at 21:20

## 2022-07-10 NOTE — PROGRESS NOTE ADULT - ASSESSMENT
85yo F PMH newly dx'ed HFpEF, HTN, hypothryoidism, presented to ED with SOB found to be hypoxemic and hypertensive, likely in flash pulmonary edema, admitted to CCU for acute hypoxic respiratory failure 2/2 CHF exacerbation. C/w diuresis on cardiac telemetry.

## 2022-07-10 NOTE — PROGRESS NOTE ADULT - SUBJECTIVE AND OBJECTIVE BOX
***STEPDOWN from CCU to [facility]***  Hospital course: 85yo F PMH HTN, hypothryoidism, LE edema presented to ED with SOB found to be hypoxemic and hypertensive, likely in flash pulmonary edema, admitted to CCU for acute hypoxic respiratory failure 2/2 CHF exacerbation. Of note pt reported she sometimes takes medications every other day. She was started on lasix 40 IV BID, entresto and spirolactone 25.  Overnight, 24 hour urine output has been about 4L. Patient was weaned down from BiPAP to NCHF 40/40, and further weaned to 2L NC. PT evaluated patient during morning rounds, and per patient states that she felt okay. Patient was transferred on 7/10 to cardiac tele.     SUBJECTIVE / INTERVAL HPI: Patient seen and examined at bedside.     ROS: negative unless otherwise stated above.    VITAL SIGNS:  Vital Signs Last 24 Hrs  T(C): 36.7 (10 Jul 2022 13:28), Max: 36.7 (09 Jul 2022 18:00)  T(F): 98.1 (10 Jul 2022 13:28), Max: 98.1 (10 Jul 2022 13:28)  HR: 73 (10 Jul 2022 13:22) (55 - 84)  BP: 116/58 (10 Jul 2022 13:22) (104/58 - 138/56)  BP(mean): 83 (10 Jul 2022 13:22) (75 - 91)  RR: 22 (10 Jul 2022 13:22) (18 - 38)  SpO2: 100% (10 Jul 2022 13:22) (86% - 100%)    Parameters below as of 10 Jul 2022 13:22  Patient On (Oxygen Delivery Method): nasal cannula  O2 Flow (L/min): 2        07-09-22 @ 07:01  -  07-10-22 @ 07:00  --------------------------------------------------------  IN: 300 mL / OUT: 4155 mL / NET: -3855 mL    07-10-22 @ 07:01  -  07-10-22 @ 13:30  --------------------------------------------------------  IN: 250 mL / OUT: 1490 mL / NET: -1240 mL        PHYSICAL EXAM:    General: NAD  HEENT: MMM  Neck: supple  Cardiovascular: +S1/S2; RRR  Respiratory: CTA B/L; no W/R/R  Gastrointestinal: soft, NT/ND  Extremities: WWP; no edema, clubbing or cyanosis  Vascular: 2+ radial, DP/PT pulses B/L  Neurological: AAOx3; no focal deficits    MEDICATIONS:  MEDICATIONS  (STANDING):  dapagliflozin 10 milliGRAM(s) Oral every 24 hours  enoxaparin Injectable 40 milliGRAM(s) SubCutaneous every 12 hours  furosemide   Injectable 40 milliGRAM(s) IV Push every 12 hours  levothyroxine 137 MICROGram(s) Oral every 24 hours  nystatin Powder 1 Application(s) Topical three times a day  pantoprazole    Tablet 40 milliGRAM(s) Oral before breakfast  sacubitril 49 mG/valsartan 51 mG 1 Tablet(s) Oral every 12 hours  spironolactone 25 milliGRAM(s) Oral every 24 hours    MEDICATIONS  (PRN):      ALLERGIES:  Allergies    Levaquin (Hives)    Intolerances        LABS:                        9.9    4.75  )-----------( 155      ( 10 Jul 2022 04:46 )             30.9     07-10    146<H>  |  107  |  23  ----------------------------<  89  4.3   |  29  |  0.94    Ca    8.5      10 Jul 2022 04:46  Phos  3.1     07-10  Mg     2.0     07-10    TPro  6.5  /  Alb  4.0  /  TBili  0.8  /  DBili  x   /  AST  28  /  ALT  15  /  AlkPhos  50  07-10    PT/INR - ( 09 Jul 2022 13:59 )   PT: 13.7 sec;   INR: 1.15          PTT - ( 09 Jul 2022 13:59 )  PTT:30.9 sec  Urinalysis Basic - ( 09 Jul 2022 08:44 )    Color: Yellow / Appearance: Clear / SG: >=1.030 / pH: x  Gluc: x / Ketone: NEGATIVE  / Bili: NEGATIVE / Urobili: 0.2 E.U./dL   Blood: x / Protein: >=300 mg/dL / Nitrite: NEGATIVE   Leuk Esterase: NEGATIVE / RBC: < 5 /HPF / WBC < 5 /HPF   Sq Epi: x / Non Sq Epi: 5-10 /HPF / Bacteria: x      CAPILLARY BLOOD GLUCOSE      POCT Blood Glucose.: 106 mg/dL (09 Jul 2022 08:19)      RADIOLOGY & ADDITIONAL TESTS: Reviewed. ***STEPDOWN from CCU to [facility]***  Hospital course: 87yo F PMH HTN, hypothryoidism, LE edema presented to ED with SOB found to be hypoxemic and hypertensive, likely in flash pulmonary edema, admitted to CCU for acute hypoxic respiratory failure 2/2 CHF exacerbation. Of note pt reported she sometimes takes medications every other day. She was started on lasix 40 IV BID, entresto and spirolactone 25.  Overnight, 24 hour urine output has been about 4L. Patient was weaned down from BiPAP to NCHF 40/40, and further weaned to 2L NC. PT evaluated patient during morning rounds, and per patient states that she felt okay. Patient was transferred on 7/10 to cardiac tele.     SUBJECTIVE / INTERVAL HPI: Patient seen and examined at bedside in CCU. Reports feeling much better. Denies SOB, CP, palpitations, abd pain, diarhhea, dysuria. Reports some constipation.     ROS: negative unless otherwise stated above.    VITAL SIGNS:  Vital Signs Last 24 Hrs  T(C): 36.7 (10 Jul 2022 13:28), Max: 36.7 (09 Jul 2022 18:00)  T(F): 98.1 (10 Jul 2022 13:28), Max: 98.1 (10 Jul 2022 13:28)  HR: 73 (10 Jul 2022 13:22) (55 - 84)  BP: 116/58 (10 Jul 2022 13:22) (104/58 - 138/56)  BP(mean): 83 (10 Jul 2022 13:22) (75 - 91)  RR: 22 (10 Jul 2022 13:22) (18 - 38)  SpO2: 100% (10 Jul 2022 13:22) (86% - 100%)    Parameters below as of 10 Jul 2022 13:22  Patient On (Oxygen Delivery Method): nasal cannula  O2 Flow (L/min): 2        07-09-22 @ 07:01  -  07-10-22 @ 07:00  --------------------------------------------------------  IN: 300 mL / OUT: 4155 mL / NET: -3855 mL    07-10-22 @ 07:01  -  07-10-22 @ 13:30  --------------------------------------------------------  IN: 250 mL / OUT: 1490 mL / NET: -1240 mL        PHYSICAL EXAM:    General: NAD, sitting up in bed  HEENT: MMM, normal oral cavity  Neck: supple, no JVD appreciated  Cardiovascular: +S1/S2; RRR  Respiratory: b/l crackles at bases; no wheezing, rhonchi  Gastrointestinal: soft, distended, NT  Extremities: WWP; b/l 1+ LE edema, moving all extremities  Vascular: 2+ radial pulses B/L  Neurological: AAOx3; CN grossly intact, UE 5/5 b/l, no focal deficits    MEDICATIONS:  MEDICATIONS  (STANDING):  dapagliflozin 10 milliGRAM(s) Oral every 24 hours  enoxaparin Injectable 40 milliGRAM(s) SubCutaneous every 12 hours  furosemide   Injectable 40 milliGRAM(s) IV Push every 12 hours  levothyroxine 137 MICROGram(s) Oral every 24 hours  nystatin Powder 1 Application(s) Topical three times a day  pantoprazole    Tablet 40 milliGRAM(s) Oral before breakfast  sacubitril 49 mG/valsartan 51 mG 1 Tablet(s) Oral every 12 hours  spironolactone 25 milliGRAM(s) Oral every 24 hours    MEDICATIONS  (PRN):      ALLERGIES:  Allergies    Levaquin (Hives)    Intolerances        LABS:                        9.9    4.75  )-----------( 155      ( 10 Jul 2022 04:46 )             30.9     07-10    146<H>  |  107  |  23  ----------------------------<  89  4.3   |  29  |  0.94    Ca    8.5      10 Jul 2022 04:46  Phos  3.1     07-10  Mg     2.0     07-10    TPro  6.5  /  Alb  4.0  /  TBili  0.8  /  DBili  x   /  AST  28  /  ALT  15  /  AlkPhos  50  07-10    PT/INR - ( 09 Jul 2022 13:59 )   PT: 13.7 sec;   INR: 1.15          PTT - ( 09 Jul 2022 13:59 )  PTT:30.9 sec  Urinalysis Basic - ( 09 Jul 2022 08:44 )    Color: Yellow / Appearance: Clear / SG: >=1.030 / pH: x  Gluc: x / Ketone: NEGATIVE  / Bili: NEGATIVE / Urobili: 0.2 E.U./dL   Blood: x / Protein: >=300 mg/dL / Nitrite: NEGATIVE   Leuk Esterase: NEGATIVE / RBC: < 5 /HPF / WBC < 5 /HPF   Sq Epi: x / Non Sq Epi: 5-10 /HPF / Bacteria: x      CAPILLARY BLOOD GLUCOSE      POCT Blood Glucose.: 106 mg/dL (09 Jul 2022 08:19)      RADIOLOGY & ADDITIONAL TESTS: Reviewed.

## 2022-07-10 NOTE — PROGRESS NOTE ADULT - ASSESSMENT
ASSESSMENT:    PLAN:     CARDIOVASCULAR  #Pump -     #Vessels -     #Electrical -     #Valves -     PULMONARY    RENAL    NEURO    GI/FEN - no IVF; replete lytes prn; NPO    PPx -     LINES -     CODE - FULL.    DISPO - continue intensive level of care on [facility]. ASSESSMENT: 85yo F PMH HTN, hypothryoidism, LE edema presented to ED with SOB found to be hypoxemic and hypertensive, likely in flash pulmonary edema, admitted to CCU for acute hypoxic respiratory failure 2/2 CHF exacerbation.    PLAN:   NEUROLOGY  No active issues    CARDIOLOGY  # Acute CHF exacerbation  Pt presented with SOB and hypertension. Likely underlying HFpEF and HTN leading to flash pulmonary edema. O2 sat 90 (lowest 64) in ED. Home meds losartan 25 qd, lasix 40 qd.   - BNP 1100  - Currently on 2L NC, wean as appropriate  - s/p lasix 40 IV push in ED  - CXR on admission shows congestion and/or infiltrates, cardiomegaly  - bedside limited TTE: preserved EF, mod MR, mild-mod TR, pulmHTN w/ pulmonary artery systolic pressure is 59 mmH  - TTE pending, f/u  - c/w lasix 40 IV BID   - c/w entresto 49/51   - c/w spironolactone 25   - start farxiga 10mg   - monitor weights, strict I/Os  - trend labs   - PT/OT eval for decondition    # Hypertension  Pt takes losartan 25 qd and lasix 40 qd at home.  - plan as above    RESPIRATORY  # Acute hypoxic respiratory failure likely 2/2 CHF exacerbation  - plan as above    GASTROINTESTINAL  No active issues    RENAL  No active issues      # D/C omalley on 7/10  -Placed primafit   - monitor I/Os    ENDOCRINE  # Hypothyroidism  - c/w synthroid 137mcg    HEME/ONC   No active issues    INFECTIOUS DISEASE  No active issues    RHEUMATOLOGY  # Rheumatoid arthritis  Patient does not currently take any home meds. Controlled.  - monitor    DERMATOLOGY  # Candidiasis  - start nystatin powder    MISCELLANEOUS  F: none  E: replete prn  N: DASH, fluid restriction 1.5L  DVT PPx: lovenox  GI PPx: protonix   Dispo: CCU to Cardiac tele   Code: FULL CODE   ASSESSMENT: 87yo F PMH HTN, hypothryoidism, LE edema presented to ED with SOB found to be hypoxemic and hypertensive, likely in flash pulmonary edema, admitted to CCU for acute hypoxic respiratory failure 2/2 CHF exacerbation.    PLAN:   NEUROLOGY  No active issues    CARDIOLOGY  # Acute CHF exacerbation  Pt presented with SOB and hypertension. Likely underlying HFpEF and HTN leading to flash pulmonary edema. O2 sat 90 (lowest 64) in ED. Home meds losartan 25 qd, lasix 40 qd.   - BNP 1100  - Currently on 2L NC, wean as appropriate  - s/p lasix 40 IV push in ED  - CXR on admission shows congestion and/or infiltrates, cardiomegaly  - bedside limited TTE: preserved EF, mod MR, mild-mod TR, pulmHTN w/ pulmonary artery systolic pressure is 59 mmH  - TTE pending, f/u  - c/w lasix 40 IV BID   - c/w entresto 49/51 BID  - c/w spironolactone 25   - start farxiga 10mg   - monitor weights, strict I/Os  - trend labs   - PT/OT eval for decondition    # Hypertension  Pt takes losartan 25 qd and lasix 40 qd at home.  - plan as above    RESPIRATORY  # Acute hypoxic respiratory failure likely 2/2 CHF exacerbation  - plan as above    GASTROINTESTINAL  No active issues    RENAL  No active issues      # D/C omalley on 7/10  -Placed primafit   - monitor I/Os    ENDOCRINE  # Hypothyroidism  - c/w synthroid 137mcg    HEME/ONC   No active issues    INFECTIOUS DISEASE  No active issues    RHEUMATOLOGY  # Rheumatoid arthritis  Patient does not currently take any home meds. Controlled.  - monitor    DERMATOLOGY  # Candidiasis  - start nystatin powder    MISCELLANEOUS  F: none  E: replete prn  N: DASH, fluid restriction 1.5L  DVT PPx: lovenox  GI PPx: protonix   Dispo: CCU to Cardiac tele   Code: FULL CODE

## 2022-07-10 NOTE — PROGRESS NOTE ADULT - SUBJECTIVE AND OBJECTIVE BOX
INTERVAL HPI/OVERNIGHT EVENTS:    SUBJECTIVE: Patient seen and examined at bedside.    OBJECTIVE:    VITAL SIGNS:  ICU Vital Signs Last 24 Hrs  T(C): 36.6 (10 Jul 2022 02:00), Max: 36.7 (09 Jul 2022 18:00)  T(F): 97.8 (10 Jul 2022 02:00), Max: 98 (09 Jul 2022 18:00)  HR: 58 (10 Jul 2022 07:00) (55 - 166)  BP: 123/56 (10 Jul 2022 07:00) (106/56 - 192/90)  BP(mean): 80 (10 Jul 2022 07:00) (75 - 108)  ABP: --  ABP(mean): --  RR: 22 (10 Jul 2022 07:00) (18 - 29)  SpO2: 96% (10 Jul 2022 07:00) (86% - 100%)    O2 Parameters below as of 10 Jul 2022 07:00  Patient On (Oxygen Delivery Method): nasal cannula  O2 Flow (L/min): 2            07-09 @ 07:01  -  07-10 @ 07:00  --------------------------------------------------------  IN: 300 mL / OUT: 4155 mL / NET: -3855 mL      CAPILLARY BLOOD GLUCOSE      POCT Blood Glucose.: 106 mg/dL (09 Jul 2022 08:19)      PHYSICAL EXAM:    General: NAD  HEENT: NC/AT; anicteric sclera  Neck: supple, no JVD  Respiratory: CTA B/L; no W/R/R  Cardiovascular: +S1/S2; RRR; no M/R/G  Gastrointestinal: soft, NT/ND; +BS x4  Extremities: WWP; 2+ peripheral pulses B/L; no LE edema  Skin: normal color and turgor; no rash  Neurological:     MEDICATIONS:  MEDICATIONS  (STANDING):  enoxaparin Injectable 40 milliGRAM(s) SubCutaneous every 12 hours  furosemide   Injectable 40 milliGRAM(s) IV Push every 12 hours  levothyroxine 137 MICROGram(s) Oral every 24 hours  nystatin Powder 1 Application(s) Topical three times a day  pantoprazole    Tablet 40 milliGRAM(s) Oral before breakfast  sacubitril 49 mG/valsartan 51 mG 1 Tablet(s) Oral every 12 hours  spironolactone 25 milliGRAM(s) Oral every 24 hours    MEDICATIONS  (PRN):      ALLERGIES:  Allergies    Levaquin (Hives)    Intolerances        LABS:                        9.9    4.75  )-----------( 155      ( 10 Jul 2022 04:46 )             30.9     07-10    146<H>  |  107  |  23  ----------------------------<  89  4.3   |  29  |  0.94    Ca    8.5      10 Jul 2022 04:46  Phos  3.1     07-10  Mg     2.0     07-10    TPro  6.5  /  Alb  4.0  /  TBili  0.8  /  DBili  x   /  AST  28  /  ALT  15  /  AlkPhos  50  07-10    LIVER FUNCTIONS - ( 10 Jul 2022 04:46 )  Alb: 4.0 g/dL / Pro: 6.5 g/dL / ALK PHOS: 50 U/L / ALT: 15 U/L / AST: 28 U/L / GGT: x           PT/INR - ( 09 Jul 2022 13:59 )   PT: 13.7 sec;   INR: 1.15          PTT - ( 09 Jul 2022 13:59 )  PTT:30.9 sec  Urinalysis Basic - ( 09 Jul 2022 08:44 )    Color: Yellow / Appearance: Clear / SG: >=1.030 / pH: x  Gluc: x / Ketone: NEGATIVE  / Bili: NEGATIVE / Urobili: 0.2 E.U./dL   Blood: x / Protein: >=300 mg/dL / Nitrite: NEGATIVE   Leuk Esterase: NEGATIVE / RBC: < 5 /HPF / WBC < 5 /HPF   Sq Epi: x / Non Sq Epi: 5-10 /HPF / Bacteria: x            RADIOLOGY & ADDITIONAL TESTS: Reviewed.     *** Transfer Note CCU to Cardiac Tele***    Hospital Course:   85yo F PMH HTN, hypothryoidism, LE edema presented to ED with SOB found to be hypoxemic and hypertensive, likely in flash pulmonary edema, admitted to CCU for acute hypoxic respiratory failure 2/2 CHF exacerbation. She was started on lasix 40 IV BID, nystatin powder for intertrigo, entresto and spirolactone 25.  Overnight, 24 hour urine output has been about 4L. Patient was weaned down from BiPAP to NCHF 40/40, and further weaned to 2L NC. PT evaluated patient during morning rounds, and per patient states that she felt okay. Patient was transferred on 7/10 to cardiac tele.     INTERVAL HPI/OVERNIGHT EVENTS: No acute events overnight.    SUBJECTIVE: Patient seen and examined at bedside. Patient states that she feels a lot better overall today. She says her shortness of breath is better than yesterday. She denied any orthopnea, PND, chest pain, palpitations. All remaining review of systems are negative.     OBJECTIVE:    VITAL SIGNS:  ICU Vital Signs Last 24 Hrs  T(C): 36.6 (10 Jul 2022 02:00), Max: 36.7 (09 Jul 2022 18:00)  T(F): 97.8 (10 Jul 2022 02:00), Max: 98 (09 Jul 2022 18:00)  HR: 58 (10 Jul 2022 07:00) (55 - 166)  BP: 123/56 (10 Jul 2022 07:00) (106/56 - 192/90)  BP(mean): 80 (10 Jul 2022 07:00) (75 - 108)  ABP: --  ABP(mean): --  RR: 22 (10 Jul 2022 07:00) (18 - 29)  SpO2: 96% (10 Jul 2022 07:00) (86% - 100%)    O2 Parameters below as of 10 Jul 2022 07:00  Patient On (Oxygen Delivery Method): nasal cannula  O2 Flow (L/min): 2 07-09 @ 07:01  -  07-10 @ 07:00  --------------------------------------------------------  IN: 300 mL / OUT: 4155 mL / NET: -3855 mL      CAPILLARY BLOOD GLUCOSE      POCT Blood Glucose.: 106 mg/dL (09 Jul 2022 08:19)      PHYSICAL EXAM:    Constitutional: resting comfortably in bed; NAD  Head: NC/AT  Neck: supple; no JVD  Respiratory: bibasilar crackles   Cardiac: +S1/S2; RRR; no M/R/G  Gastrointestinal: abdomen soft, NT/ND;+BSx4  Extremities: WWP, no clubbing or cyanosis; B/L LE edema  Vascular: 2+ radial, DP/PT pulses B/L  Dermatologic: skin warm, dry and intact; candidiasis in skin folds  Neurologic: AAOx3    MEDICATIONS:  MEDICATIONS  (STANDING):  enoxaparin Injectable 40 milliGRAM(s) SubCutaneous every 12 hours  furosemide   Injectable 40 milliGRAM(s) IV Push every 12 hours  levothyroxine 137 MICROGram(s) Oral every 24 hours  nystatin Powder 1 Application(s) Topical three times a day  pantoprazole    Tablet 40 milliGRAM(s) Oral before breakfast  sacubitril 49 mG/valsartan 51 mG 1 Tablet(s) Oral every 12 hours  spironolactone 25 milliGRAM(s) Oral every 24 hours    MEDICATIONS  (PRN):      ALLERGIES:  Allergies    Levaquin (Hives)    Intolerances        LABS:                        9.9    4.75  )-----------( 155      ( 10 Jul 2022 04:46 )             30.9     07-10    146<H>  |  107  |  23  ----------------------------<  89  4.3   |  29  |  0.94    Ca    8.5      10 Jul 2022 04:46  Phos  3.1     07-10  Mg     2.0     07-10    TPro  6.5  /  Alb  4.0  /  TBili  0.8  /  DBili  x   /  AST  28  /  ALT  15  /  AlkPhos  50  07-10    LIVER FUNCTIONS - ( 10 Jul 2022 04:46 )  Alb: 4.0 g/dL / Pro: 6.5 g/dL / ALK PHOS: 50 U/L / ALT: 15 U/L / AST: 28 U/L / GGT: x           PT/INR - ( 09 Jul 2022 13:59 )   PT: 13.7 sec;   INR: 1.15          PTT - ( 09 Jul 2022 13:59 )  PTT:30.9 sec  Urinalysis Basic - ( 09 Jul 2022 08:44 )    Color: Yellow / Appearance: Clear / SG: >=1.030 / pH: x  Gluc: x / Ketone: NEGATIVE  / Bili: NEGATIVE / Urobili: 0.2 E.U./dL   Blood: x / Protein: >=300 mg/dL / Nitrite: NEGATIVE   Leuk Esterase: NEGATIVE / RBC: < 5 /HPF / WBC < 5 /HPF   Sq Epi: x / Non Sq Epi: 5-10 /HPF / Bacteria: x            RADIOLOGY & ADDITIONAL TESTS: Reviewed.

## 2022-07-11 LAB
ALBUMIN SERPL ELPH-MCNC: 3.8 G/DL — SIGNIFICANT CHANGE UP (ref 3.3–5)
ALP SERPL-CCNC: 55 U/L — SIGNIFICANT CHANGE UP (ref 40–120)
ALT FLD-CCNC: 13 U/L — SIGNIFICANT CHANGE UP (ref 10–45)
ANION GAP SERPL CALC-SCNC: 12 MMOL/L — SIGNIFICANT CHANGE UP (ref 5–17)
AST SERPL-CCNC: 22 U/L — SIGNIFICANT CHANGE UP (ref 10–40)
BILIRUB SERPL-MCNC: 0.7 MG/DL — SIGNIFICANT CHANGE UP (ref 0.2–1.2)
BUN SERPL-MCNC: 22 MG/DL — SIGNIFICANT CHANGE UP (ref 7–23)
CALCIUM SERPL-MCNC: 9 MG/DL — SIGNIFICANT CHANGE UP (ref 8.4–10.5)
CHLORIDE SERPL-SCNC: 100 MMOL/L — SIGNIFICANT CHANGE UP (ref 96–108)
CO2 SERPL-SCNC: 28 MMOL/L — SIGNIFICANT CHANGE UP (ref 22–31)
CREAT SERPL-MCNC: 0.91 MG/DL — SIGNIFICANT CHANGE UP (ref 0.5–1.3)
EGFR: 62 ML/MIN/1.73M2 — SIGNIFICANT CHANGE UP
GLUCOSE SERPL-MCNC: 100 MG/DL — HIGH (ref 70–99)
HCT VFR BLD CALC: 36.1 % — SIGNIFICANT CHANGE UP (ref 34.5–45)
HGB BLD-MCNC: 11.5 G/DL — SIGNIFICANT CHANGE UP (ref 11.5–15.5)
MAGNESIUM SERPL-MCNC: 2 MG/DL — SIGNIFICANT CHANGE UP (ref 1.6–2.6)
MCHC RBC-ENTMCNC: 31.2 PG — SIGNIFICANT CHANGE UP (ref 27–34)
MCHC RBC-ENTMCNC: 31.9 GM/DL — LOW (ref 32–36)
MCV RBC AUTO: 97.8 FL — SIGNIFICANT CHANGE UP (ref 80–100)
NRBC # BLD: 0 /100 WBCS — SIGNIFICANT CHANGE UP (ref 0–0)
PHOSPHATE SERPL-MCNC: 3.2 MG/DL — SIGNIFICANT CHANGE UP (ref 2.5–4.5)
PLATELET # BLD AUTO: 124 K/UL — LOW (ref 150–400)
POTASSIUM SERPL-MCNC: 4 MMOL/L — SIGNIFICANT CHANGE UP (ref 3.5–5.3)
POTASSIUM SERPL-SCNC: 4 MMOL/L — SIGNIFICANT CHANGE UP (ref 3.5–5.3)
PROT SERPL-MCNC: 7.2 G/DL — SIGNIFICANT CHANGE UP (ref 6–8.3)
RBC # BLD: 3.69 M/UL — LOW (ref 3.8–5.2)
RBC # FLD: 20.8 % — HIGH (ref 10.3–14.5)
SODIUM SERPL-SCNC: 140 MMOL/L — SIGNIFICANT CHANGE UP (ref 135–145)
WBC # BLD: 5.44 K/UL — SIGNIFICANT CHANGE UP (ref 3.8–10.5)
WBC # FLD AUTO: 5.44 K/UL — SIGNIFICANT CHANGE UP (ref 3.8–10.5)

## 2022-07-11 PROCEDURE — 71045 X-RAY EXAM CHEST 1 VIEW: CPT | Mod: 26

## 2022-07-11 PROCEDURE — 99233 SBSQ HOSP IP/OBS HIGH 50: CPT

## 2022-07-11 RX ORDER — SACUBITRIL AND VALSARTAN 24; 26 MG/1; MG/1
1 TABLET, FILM COATED ORAL
Refills: 0 | Status: DISCONTINUED | OUTPATIENT
Start: 2022-07-11 | End: 2022-07-11

## 2022-07-11 RX ORDER — SACUBITRIL AND VALSARTAN 24; 26 MG/1; MG/1
1 TABLET, FILM COATED ORAL
Refills: 0 | Status: DISCONTINUED | OUTPATIENT
Start: 2022-07-11 | End: 2022-07-12

## 2022-07-11 RX ORDER — SPIRONOLACTONE 25 MG/1
25 TABLET, FILM COATED ORAL EVERY 24 HOURS
Refills: 0 | Status: DISCONTINUED | OUTPATIENT
Start: 2022-07-11 | End: 2022-07-12

## 2022-07-11 RX ADMIN — NYSTATIN CREAM 1 APPLICATION(S): 100000 CREAM TOPICAL at 13:12

## 2022-07-11 RX ADMIN — ENOXAPARIN SODIUM 40 MILLIGRAM(S): 100 INJECTION SUBCUTANEOUS at 22:08

## 2022-07-11 RX ADMIN — SACUBITRIL AND VALSARTAN 1 TABLET(S): 24; 26 TABLET, FILM COATED ORAL at 22:14

## 2022-07-11 RX ADMIN — ENOXAPARIN SODIUM 40 MILLIGRAM(S): 100 INJECTION SUBCUTANEOUS at 08:29

## 2022-07-11 RX ADMIN — PANTOPRAZOLE SODIUM 40 MILLIGRAM(S): 20 TABLET, DELAYED RELEASE ORAL at 06:10

## 2022-07-11 RX ADMIN — SACUBITRIL AND VALSARTAN 1 TABLET(S): 24; 26 TABLET, FILM COATED ORAL at 06:10

## 2022-07-11 RX ADMIN — NYSTATIN CREAM 1 APPLICATION(S): 100000 CREAM TOPICAL at 22:14

## 2022-07-11 RX ADMIN — Medication 137 MICROGRAM(S): at 06:10

## 2022-07-11 RX ADMIN — NYSTATIN CREAM 1 APPLICATION(S): 100000 CREAM TOPICAL at 06:09

## 2022-07-11 RX ADMIN — DAPAGLIFLOZIN 10 MILLIGRAM(S): 10 TABLET, FILM COATED ORAL at 17:50

## 2022-07-11 RX ADMIN — Medication 40 MILLIGRAM(S): at 06:10

## 2022-07-11 NOTE — PROGRESS NOTE ADULT - PROBLEM SELECTOR PLAN 5
F: none  E: replenish as appropriate  N: DASH    VTE Prophylaxis: Lovenox 40mg q12h (weight based)  GI: not needed  C: Full Code  D: f/u PT recs

## 2022-07-11 NOTE — OCCUPATIONAL THERAPY INITIAL EVALUATION ADULT - PERTINENT HX OF CURRENT PROBLEM, REHAB EVAL
86 year old female  presented to ED with SOB found to be hypoxemic and hypertensive, likely in flash pulmonary edema, admitted to CCU for acute hypoxic respiratory failure 2/2 CHF exacerbation.

## 2022-07-11 NOTE — PROGRESS NOTE ADULT - SUBJECTIVE AND OBJECTIVE BOX
OVERNIGHT EVENTS:    SUBJECTIVE / INTERVAL HPI: Patient seen and examined at bedside.     VITAL SIGNS:  Vital Signs Last 24 Hrs  T(C): 36.8 (11 Jul 2022 05:33), Max: 36.9 (10 Jul 2022 21:00)  T(F): 98.2 (11 Jul 2022 05:33), Max: 98.5 (10 Jul 2022 21:00)  HR: 69 (11 Jul 2022 04:02) (58 - 78)  BP: 119/67 (11 Jul 2022 04:02) (104/58 - 158/65)  BP(mean): 86 (11 Jul 2022 04:02) (75 - 94)  RR: 18 (11 Jul 2022 04:02) (18 - 38)  SpO2: 95% (11 Jul 2022 04:02) (86% - 100%)    Parameters below as of 11 Jul 2022 04:02  Patient On (Oxygen Delivery Method): room air        PHYSICAL EXAM:    General: Well developed, well nourished, no acute distress  HEENT: NC/AT; PERRL, anicteric sclera; MMM  Neck: supple  Cardiovascular: +S1/S2, RRR, no murmurs, rubs, gallops  Respiratory: CTA B/L; no W/R/R  Gastrointestinal: soft, NT/ND; +BSx4  Extremities: WWP; no edema, clubbing or cyanosis  Vascular: 2+ radial, DP/PT pulses B/L  Neurological: AAOx3; no focal deficits    MEDICATIONS:  MEDICATIONS  (STANDING):  dapagliflozin 10 milliGRAM(s) Oral every 24 hours  enoxaparin Injectable 40 milliGRAM(s) SubCutaneous every 12 hours  furosemide   Injectable 40 milliGRAM(s) IV Push every 12 hours  levothyroxine 137 MICROGram(s) Oral every 24 hours  nystatin Powder 1 Application(s) Topical three times a day  pantoprazole    Tablet 40 milliGRAM(s) Oral before breakfast  polyethylene glycol 3350 17 Gram(s) Oral every 24 hours  sacubitril 49 mG/valsartan 51 mG 1 Tablet(s) Oral every 12 hours  senna 2 Tablet(s) Oral at bedtime  spironolactone 25 milliGRAM(s) Oral every 24 hours    MEDICATIONS  (PRN):      ALLERGIES:  Allergies    Levaquin (Hives)    Intolerances        LABS:                        11.5   5.44  )-----------( 124      ( 11 Jul 2022 05:30 )             36.1     07-11    140  |  100  |  22  ----------------------------<  100<H>  4.0   |  28  |  0.91    Ca    9.0      11 Jul 2022 05:30  Phos  3.2     07-11  Mg     2.0     07-11    TPro  7.2  /  Alb  3.8  /  TBili  0.7  /  DBili  x   /  AST  22  /  ALT  13  /  AlkPhos  55  07-11    PT/INR - ( 09 Jul 2022 13:59 )   PT: 13.7 sec;   INR: 1.15          PTT - ( 09 Jul 2022 13:59 )  PTT:30.9 sec  Urinalysis Basic - ( 09 Jul 2022 08:44 )    Color: Yellow / Appearance: Clear / SG: >=1.030 / pH: x  Gluc: x / Ketone: NEGATIVE  / Bili: NEGATIVE / Urobili: 0.2 E.U./dL   Blood: x / Protein: >=300 mg/dL / Nitrite: NEGATIVE   Leuk Esterase: NEGATIVE / RBC: < 5 /HPF / WBC < 5 /HPF   Sq Epi: x / Non Sq Epi: 5-10 /HPF / Bacteria: x      CAPILLARY BLOOD GLUCOSE      POCT Blood Glucose.: 106 mg/dL (09 Jul 2022 08:19)      RADIOLOGY & ADDITIONAL TESTS: Reviewed.    PLAN:  CC: Patient is a 86y old  Female who presents with a chief complaint of SOB (11 Jul 2022 07:36)      INTERVAL EVENTS: ZEN    SUBJECTIVE / INTERVAL HPI: Patient seen and examined at bedside. Reports feeling well. Denies SOB, CP, palpitations, abd pain, diarhhea, dysuria. Appetite a little better.     ROS: negative unless otherwise stated above.    VITAL SIGNS:  Vital Signs Last 24 Hrs  T(C): 36.8 (11 Jul 2022 05:33), Max: 36.9 (10 Jul 2022 21:00)  T(F): 98.2 (11 Jul 2022 05:33), Max: 98.5 (10 Jul 2022 21:00)  HR: 69 (11 Jul 2022 04:02) (58 - 78)  BP: 119/67 (11 Jul 2022 04:02) (104/58 - 158/65)  BP(mean): 86 (11 Jul 2022 04:02) (75 - 94)  RR: 18 (11 Jul 2022 04:02) (18 - 38)  SpO2: 95% (11 Jul 2022 04:02) (86% - 100%)    Parameters below as of 11 Jul 2022 04:02  Patient On (Oxygen Delivery Method): room air          07-10-22 @ 07:01  -  07-11-22 @ 07:00  --------------------------------------------------------  IN: 490 mL / OUT: 2690 mL / NET: -2200 mL    07-11-22 @ 07:01  -  07-11-22 @ 08:15  --------------------------------------------------------  IN: 0 mL / OUT: 200 mL / NET: -200 mL        PHYSICAL EXAM:    General: NAD, sitting up in bed  HEENT: MMM, normal oral cavity  Neck: supple, no JVD appreciated  Cardiovascular: +S1/S2; RRR  Respiratory: few crackles R base; no wheezing, rhonchi  Gastrointestinal: soft, distended, NT  Extremities: WWP; trace LE edema, moving all extremities  Vascular: 2+ radial pulses B/L  Neurological: AAOx3; CN grossly intact, no focal deficits    MEDICATIONS:  MEDICATIONS  (STANDING):  dapagliflozin 10 milliGRAM(s) Oral every 24 hours  enoxaparin Injectable 40 milliGRAM(s) SubCutaneous every 12 hours  furosemide   Injectable 40 milliGRAM(s) IV Push every 12 hours  levothyroxine 137 MICROGram(s) Oral every 24 hours  nystatin Powder 1 Application(s) Topical three times a day  pantoprazole    Tablet 40 milliGRAM(s) Oral before breakfast  polyethylene glycol 3350 17 Gram(s) Oral every 24 hours  sacubitril 49 mG/valsartan 51 mG 1 Tablet(s) Oral every 12 hours  senna 2 Tablet(s) Oral at bedtime  spironolactone 25 milliGRAM(s) Oral every 24 hours    MEDICATIONS  (PRN):      ALLERGIES:  Allergies    Levaquin (Hives)    Intolerances        LABS:                        11.5   5.44  )-----------( 124      ( 11 Jul 2022 05:30 )             36.1     07-11    140  |  100  |  22  ----------------------------<  100<H>  4.0   |  28  |  0.91    Ca    9.0      11 Jul 2022 05:30  Phos  3.2     07-11  Mg     2.0     07-11    TPro  7.2  /  Alb  3.8  /  TBili  0.7  /  DBili  x   /  AST  22  /  ALT  13  /  AlkPhos  55  07-11    PT/INR - ( 09 Jul 2022 13:59 )   PT: 13.7 sec;   INR: 1.15          PTT - ( 09 Jul 2022 13:59 )  PTT:30.9 sec  Urinalysis Basic - ( 09 Jul 2022 08:44 )    Color: Yellow / Appearance: Clear / SG: >=1.030 / pH: x  Gluc: x / Ketone: NEGATIVE  / Bili: NEGATIVE / Urobili: 0.2 E.U./dL   Blood: x / Protein: >=300 mg/dL / Nitrite: NEGATIVE   Leuk Esterase: NEGATIVE / RBC: < 5 /HPF / WBC < 5 /HPF   Sq Epi: x / Non Sq Epi: 5-10 /HPF / Bacteria: x      CAPILLARY BLOOD GLUCOSE      POCT Blood Glucose.: 106 mg/dL (09 Jul 2022 08:19)      RADIOLOGY & ADDITIONAL TESTS: Reviewed. INTERVAL EVENTS: ZEN    SUBJECTIVE / INTERVAL HPI: Patient seen and examined at bedside. Reports feeling well. Denies SOB, CP, palpitations, abd pain, diarhhea, dysuria. Appetite a little better.     ROS: negative unless otherwise stated above.    VITAL SIGNS:  Vital Signs Last 24 Hrs  T(C): 36.8 (11 Jul 2022 05:33), Max: 36.9 (10 Jul 2022 21:00)  T(F): 98.2 (11 Jul 2022 05:33), Max: 98.5 (10 Jul 2022 21:00)  HR: 69 (11 Jul 2022 04:02) (58 - 78)  BP: 119/67 (11 Jul 2022 04:02) (104/58 - 158/65)  BP(mean): 86 (11 Jul 2022 04:02) (75 - 94)  RR: 18 (11 Jul 2022 04:02) (18 - 38)  SpO2: 95% (11 Jul 2022 04:02) (86% - 100%)    Parameters below as of 11 Jul 2022 04:02  Patient On (Oxygen Delivery Method): room air          07-10-22 @ 07:01  -  07-11-22 @ 07:00  --------------------------------------------------------  IN: 490 mL / OUT: 2690 mL / NET: -2200 mL    07-11-22 @ 07:01  -  07-11-22 @ 08:15  --------------------------------------------------------  IN: 0 mL / OUT: 200 mL / NET: -200 mL        PHYSICAL EXAM:    General: NAD, sitting up in bed  HEENT: MMM, normal oral cavity  Neck: supple, no JVD appreciated  Cardiovascular: +S1/S2; RRR  Respiratory: few crackles R base; no wheezing, rhonchi  Gastrointestinal: soft, distended, NT  Extremities: WWP; trace LE edema, moving all extremities  Vascular: 2+ radial pulses B/L  Neurological: AAOx3; CN grossly intact, no focal deficits    MEDICATIONS:  MEDICATIONS  (STANDING):  dapagliflozin 10 milliGRAM(s) Oral every 24 hours  enoxaparin Injectable 40 milliGRAM(s) SubCutaneous every 12 hours  furosemide   Injectable 40 milliGRAM(s) IV Push every 12 hours  levothyroxine 137 MICROGram(s) Oral every 24 hours  nystatin Powder 1 Application(s) Topical three times a day  pantoprazole    Tablet 40 milliGRAM(s) Oral before breakfast  polyethylene glycol 3350 17 Gram(s) Oral every 24 hours  sacubitril 49 mG/valsartan 51 mG 1 Tablet(s) Oral every 12 hours  senna 2 Tablet(s) Oral at bedtime  spironolactone 25 milliGRAM(s) Oral every 24 hours    MEDICATIONS  (PRN):      ALLERGIES:  Allergies    Levaquin (Hives)    Intolerances        LABS:                        11.5   5.44  )-----------( 124      ( 11 Jul 2022 05:30 )             36.1     07-11    140  |  100  |  22  ----------------------------<  100<H>  4.0   |  28  |  0.91    Ca    9.0      11 Jul 2022 05:30  Phos  3.2     07-11  Mg     2.0     07-11    TPro  7.2  /  Alb  3.8  /  TBili  0.7  /  DBili  x   /  AST  22  /  ALT  13  /  AlkPhos  55  07-11    PT/INR - ( 09 Jul 2022 13:59 )   PT: 13.7 sec;   INR: 1.15          PTT - ( 09 Jul 2022 13:59 )  PTT:30.9 sec  Urinalysis Basic - ( 09 Jul 2022 08:44 )    Color: Yellow / Appearance: Clear / SG: >=1.030 / pH: x  Gluc: x / Ketone: NEGATIVE  / Bili: NEGATIVE / Urobili: 0.2 E.U./dL   Blood: x / Protein: >=300 mg/dL / Nitrite: NEGATIVE   Leuk Esterase: NEGATIVE / RBC: < 5 /HPF / WBC < 5 /HPF   Sq Epi: x / Non Sq Epi: 5-10 /HPF / Bacteria: x      CAPILLARY BLOOD GLUCOSE      POCT Blood Glucose.: 106 mg/dL (09 Jul 2022 08:19)      RADIOLOGY & ADDITIONAL TESTS: Reviewed.

## 2022-07-11 NOTE — OCCUPATIONAL THERAPY INITIAL EVALUATION ADULT - GENERAL OBSERVATIONS, REHAB EVAL
Pt cleared for OT by covering MARILYN Abdullahi. Pt received semi-lane, NAD, +2L/min NC, +purewick, +tele, +IV heplock.

## 2022-07-11 NOTE — PROGRESS NOTE ADULT - ASSESSMENT
87yo F PMH newly dx'ed HFpEF, HTN, hypothryoidism, presented to ED with SOB found to be hypoxemic and hypertensive, likely in flash pulmonary edema, admitted to CCU for acute hypoxic respiratory failure 2/2 CHF exacerbation. C/w IV diuresis on cardiac telemetry.

## 2022-07-11 NOTE — PROGRESS NOTE ADULT - ATTENDING COMMENTS
86F w/ pmh of hypothyroidism, HTN, HLD p/w acute dCHF exacerbation c/b pulmonary edema, admitted to CCU for further mgmt    -dCHF - newly dx'd HFpEF p/w acute hypoxic resp failure 2/2 pulmonary edema, initially requiring BIPAP, s/p initiation of IV diuresis w/ significant improvement -> net neg ~4L/24hrs, weaned of BIPAP to NC, still remains overloaded on exam - c/w Lasix 40 IV BID, c/w Entresto 49/51 BID, c/w Spironolactone 25 daily, Start Farxiga 10mg daily, d/c shahram, monitor Is&Os  -Hypothyroidism - c/w Synthroid  -OOB to chair / PT eval  -DASH diet  -Dispo: SD to Cardiac Tele  -Full Code    Aundrea Izquierdo MD  CCU Attending
86F w/ pmh of hypothyroidism, HTN, HLD p/w acute dCHF exacerbation c/b pulmonary edema, admitted to CCU for further mgmt    -dCHF - newly dx'd HFpEF p/w acute hypoxic resp failure 2/2 pulmonary edema, responding well to IV diuresis, has made significant improvement -> net neg ~6L/48hrs, weaned  to NC, mildly overloaded on exam - c/w Lasix 40 IV BID, c/w Entresto 49/51 BID, c/w Spironolactone 25 daily, c/w Farxiga 10mg daily, d/c omalley, monitor Is&Os  -Hypothyroidism - c/w Synthroid  -OOB to chair / PT eval  -DASH diet  -Dispo: Cardiac Tele  -Full Code    Aundrea Izquierdo MD  Cardiology Attending

## 2022-07-11 NOTE — PROGRESS NOTE ADULT - PROBLEM SELECTOR PLAN 1
Pt presented with SOB and hypertension. Likely underlying newly diagnosed HFpEF leading to flash pulmonary edema. Initially required BIPAP, with IV diuresis now on NC, but remains overloaded on exam   Home meds losartan 25 qd, lasix 40 qd  - c/w Lasix 40 IV BID  - c/w Entresto 49/51 BID  - c/w Spironolactone 25 daily  - Start Farxiga 10mg daily  - monitor Is&Os
Pt presented with SOB and hypertension. Likely underlying newly diagnosed HFpEF leading to flash pulmonary edema. Initially required BIPAP, with IV diuresis now on NC and clinically improving.   Home meds losartan 25 qd, lasix 40 qd  - c/w Lasix 40 IV BID  - c/w Entresto 49/51 BID  - c/w Spironolactone 25 daily  - c/w Farxiga 10mg daily  - monitor Is&Os

## 2022-07-11 NOTE — OCCUPATIONAL THERAPY INITIAL EVALUATION ADULT - AMBULATORY DEVICES NEEDED
Detail Level: Zone
Photo Preface (Leave Blank If You Do Not Want): Photographs were obtained today
SC or shopping cart./yes

## 2022-07-12 ENCOUNTER — TRANSCRIPTION ENCOUNTER (OUTPATIENT)
Age: 87
End: 2022-07-12

## 2022-07-12 VITALS — TEMPERATURE: 99 F

## 2022-07-12 LAB
ANION GAP SERPL CALC-SCNC: 14 MMOL/L — SIGNIFICANT CHANGE UP (ref 5–17)
BUN SERPL-MCNC: 37 MG/DL — HIGH (ref 7–23)
CALCIUM SERPL-MCNC: 9 MG/DL — SIGNIFICANT CHANGE UP (ref 8.4–10.5)
CHLORIDE SERPL-SCNC: 99 MMOL/L — SIGNIFICANT CHANGE UP (ref 96–108)
CO2 SERPL-SCNC: 28 MMOL/L — SIGNIFICANT CHANGE UP (ref 22–31)
CREAT SERPL-MCNC: 1.77 MG/DL — HIGH (ref 0.5–1.3)
EGFR: 28 ML/MIN/1.73M2 — LOW
GLUCOSE SERPL-MCNC: 111 MG/DL — HIGH (ref 70–99)
HCT VFR BLD CALC: 33.2 % — LOW (ref 34.5–45)
HGB BLD-MCNC: 10.8 G/DL — LOW (ref 11.5–15.5)
MAGNESIUM SERPL-MCNC: 2.2 MG/DL — SIGNIFICANT CHANGE UP (ref 1.6–2.6)
MCHC RBC-ENTMCNC: 31.5 PG — SIGNIFICANT CHANGE UP (ref 27–34)
MCHC RBC-ENTMCNC: 32.5 GM/DL — SIGNIFICANT CHANGE UP (ref 32–36)
MCV RBC AUTO: 96.8 FL — SIGNIFICANT CHANGE UP (ref 80–100)
NRBC # BLD: 0 /100 WBCS — SIGNIFICANT CHANGE UP (ref 0–0)
PHOSPHATE SERPL-MCNC: 4 MG/DL — SIGNIFICANT CHANGE UP (ref 2.5–4.5)
PLATELET # BLD AUTO: 123 K/UL — LOW (ref 150–400)
POTASSIUM SERPL-MCNC: 4.1 MMOL/L — SIGNIFICANT CHANGE UP (ref 3.5–5.3)
POTASSIUM SERPL-SCNC: 4.1 MMOL/L — SIGNIFICANT CHANGE UP (ref 3.5–5.3)
RBC # BLD: 3.43 M/UL — LOW (ref 3.8–5.2)
RBC # FLD: 21.1 % — HIGH (ref 10.3–14.5)
SODIUM SERPL-SCNC: 141 MMOL/L — SIGNIFICANT CHANGE UP (ref 135–145)
WBC # BLD: 4.22 K/UL — SIGNIFICANT CHANGE UP (ref 3.8–10.5)
WBC # FLD AUTO: 4.22 K/UL — SIGNIFICANT CHANGE UP (ref 3.8–10.5)

## 2022-07-12 PROCEDURE — 82570 ASSAY OF URINE CREATININE: CPT

## 2022-07-12 PROCEDURE — 97116 GAIT TRAINING THERAPY: CPT

## 2022-07-12 PROCEDURE — 97530 THERAPEUTIC ACTIVITIES: CPT

## 2022-07-12 PROCEDURE — 83880 ASSAY OF NATRIURETIC PEPTIDE: CPT

## 2022-07-12 PROCEDURE — 84100 ASSAY OF PHOSPHORUS: CPT

## 2022-07-12 PROCEDURE — 84484 ASSAY OF TROPONIN QUANT: CPT

## 2022-07-12 PROCEDURE — 84443 ASSAY THYROID STIM HORMONE: CPT

## 2022-07-12 PROCEDURE — 82962 GLUCOSE BLOOD TEST: CPT

## 2022-07-12 PROCEDURE — 85730 THROMBOPLASTIN TIME PARTIAL: CPT

## 2022-07-12 PROCEDURE — 36415 COLL VENOUS BLD VENIPUNCTURE: CPT

## 2022-07-12 PROCEDURE — 80048 BASIC METABOLIC PNL TOTAL CA: CPT

## 2022-07-12 PROCEDURE — 84156 ASSAY OF PROTEIN URINE: CPT

## 2022-07-12 PROCEDURE — 86900 BLOOD TYPING SEROLOGIC ABO: CPT

## 2022-07-12 PROCEDURE — 80053 COMPREHEN METABOLIC PANEL: CPT

## 2022-07-12 PROCEDURE — 85610 PROTHROMBIN TIME: CPT

## 2022-07-12 PROCEDURE — 87637 SARSCOV2&INF A&B&RSV AMP PRB: CPT

## 2022-07-12 PROCEDURE — 85379 FIBRIN DEGRADATION QUANT: CPT

## 2022-07-12 PROCEDURE — 93306 TTE W/DOPPLER COMPLETE: CPT

## 2022-07-12 PROCEDURE — 86901 BLOOD TYPING SEROLOGIC RH(D): CPT

## 2022-07-12 PROCEDURE — 80061 LIPID PANEL: CPT

## 2022-07-12 PROCEDURE — 86850 RBC ANTIBODY SCREEN: CPT

## 2022-07-12 PROCEDURE — 71045 X-RAY EXAM CHEST 1 VIEW: CPT

## 2022-07-12 PROCEDURE — 97161 PT EVAL LOW COMPLEX 20 MIN: CPT

## 2022-07-12 PROCEDURE — 82803 BLOOD GASES ANY COMBINATION: CPT

## 2022-07-12 PROCEDURE — 83735 ASSAY OF MAGNESIUM: CPT

## 2022-07-12 PROCEDURE — 96374 THER/PROPH/DIAG INJ IV PUSH: CPT

## 2022-07-12 PROCEDURE — 99238 HOSP IP/OBS DSCHRG MGMT 30/<: CPT | Mod: GC

## 2022-07-12 PROCEDURE — 85025 COMPLETE CBC W/AUTO DIFF WBC: CPT

## 2022-07-12 PROCEDURE — 85027 COMPLETE CBC AUTOMATED: CPT

## 2022-07-12 PROCEDURE — 99285 EMERGENCY DEPT VISIT HI MDM: CPT

## 2022-07-12 PROCEDURE — 83036 HEMOGLOBIN GLYCOSYLATED A1C: CPT

## 2022-07-12 PROCEDURE — 94660 CPAP INITIATION&MGMT: CPT

## 2022-07-12 PROCEDURE — 83605 ASSAY OF LACTIC ACID: CPT

## 2022-07-12 PROCEDURE — 93005 ELECTROCARDIOGRAM TRACING: CPT

## 2022-07-12 PROCEDURE — 81001 URINALYSIS AUTO W/SCOPE: CPT

## 2022-07-12 RX ORDER — FUROSEMIDE 40 MG
1 TABLET ORAL
Qty: 0 | Refills: 0 | DISCHARGE

## 2022-07-12 RX ORDER — LOSARTAN POTASSIUM 100 MG/1
1 TABLET, FILM COATED ORAL
Qty: 0 | Refills: 0 | DISCHARGE

## 2022-07-12 RX ORDER — METOPROLOL TARTRATE 50 MG
12.5 TABLET ORAL EVERY 24 HOURS
Refills: 0 | Status: DISCONTINUED | OUTPATIENT
Start: 2022-07-12 | End: 2022-07-12

## 2022-07-12 RX ADMIN — ENOXAPARIN SODIUM 40 MILLIGRAM(S): 100 INJECTION SUBCUTANEOUS at 08:56

## 2022-07-12 RX ADMIN — NYSTATIN CREAM 1 APPLICATION(S): 100000 CREAM TOPICAL at 05:23

## 2022-07-12 RX ADMIN — PANTOPRAZOLE SODIUM 40 MILLIGRAM(S): 20 TABLET, DELAYED RELEASE ORAL at 05:22

## 2022-07-12 RX ADMIN — Medication 137 MICROGRAM(S): at 05:21

## 2022-07-12 NOTE — DISCHARGE NOTE PROVIDER - NSDCCPCAREPLAN_GEN_ALL_CORE_FT
PRINCIPAL DISCHARGE DIAGNOSIS  Diagnosis: Congestive heart failure  Assessment and Plan of Treatment: You came to the hospital with an episode of acute heart failure. You were first taken care of in the cardiac critical care unit. You       PRINCIPAL DISCHARGE DIAGNOSIS  Diagnosis: Congestive heart failure  Assessment and Plan of Treatment: You came to the hospital with an episode of acute heart failure. You were first taken care of in the cardiac critical care unit. You were treated with an intravenous       PRINCIPAL DISCHARGE DIAGNOSIS  Diagnosis: Congestive heart failure  Assessment and Plan of Treatment: You came to the hospital with an episode of acute heart failure. You were first taken care of in the cardiac critical care unit. You were treated with an intravenous diuretic called lasix to get fluid out of your body. You also required supplemental oxygen. As the fluid was remoevd your breathing improved. You will continue taking certain medications to control your heart failure. You will also get physical therapy and occupational therapy at home to improve your functioning.  *Please follow up with your General Cardiologist, Dr Cabrera, this week. He will call you today to schedule.  *Please follow up with Dr. Izquierdo (Heart Failure) on August 2nd to monitor your heart failure.

## 2022-07-12 NOTE — DISCHARGE NOTE PROVIDER - NSDCMRMEDTOKEN_GEN_ALL_CORE_FT
esomeprazole 40 mg oral delayed release capsule: 1 cap(s) orally once a day  Lasix 40 mg oral tablet: 1 tab(s) orally once a day  losartan 25 mg oral tablet: 1 tab(s) orally once a day  Synthroid 137 mcg (0.137 mg) oral tablet: 1 tab(s) orally once a day   esomeprazole 40 mg oral delayed release capsule: 1 cap(s) orally once a day  Lasix 20 mg oral tablet: 1 tab(s) orally Monday, Wednesday, and Friday.  First dose on July 15th.  losartan 25 mg oral tablet: 1 tab(s) orally once a day.  First dose on July 13th.  Synthroid 137 mcg (0.137 mg) oral tablet: 1 tab(s) orally once a day  Toprol-XL 25 mg oral tablet, extended release: Half tab(s) orally once a day.  First dose on July 13th

## 2022-07-12 NOTE — DISCHARGE NOTE NURSING/CASE MANAGEMENT/SOCIAL WORK - NSDCFUADDAPPT_GEN_ALL_CORE_FT
Please follow up with Dr. Cabrera (General Cardiology) to monitor your heart failure. Our team spoke with Dr. Cabrera directly. They are setting you up with an appointment for this week and will call you today to confirm.    Please follow up with Dr. Izquierdo (Heart Failure) on August 2nd at 2:30PM to monitor your heart failure while taking the new medications.

## 2022-07-12 NOTE — DISCHARGE NOTE NURSING/CASE MANAGEMENT/SOCIAL WORK - NSDCPEFALRISK_GEN_ALL_CORE
For information on Fall & Injury Prevention, visit: [email or web address] OR  [email or web address] OR  https://www.cdc.gov/steadi/patient.html

## 2022-07-12 NOTE — DISCHARGE NOTE NURSING/CASE MANAGEMENT/SOCIAL WORK - PATIENT PORTAL LINK FT
You can access the FollowMyHealth Patient Portal offered by [facility] by registering at the following website: [email or web address]. By joining [facility]’s FollowMyHealth portal, you will also be able to view your health information using other applications (apps) compatible with our system.

## 2022-07-12 NOTE — DISCHARGE NOTE PROVIDER - NSCORESITESY/N_GEN_A_CORE_RD
Problem: Gastrointestinal Condition Comorbidity  Intervention: Prevent/Manage Pain  Pt's VSS. BG 65, pt had half of white bread toast with peanut butter and jelly, few sips of apple juice, BG up to 104. Pt had 120 ml of apple juice around 0300. C/O abdominal pain and left leg pain around 0300 requesting for oxycodone, night crosscover paged. One time order received for oxycodone, given. Pt doesn't remember she had the oxycodone, had to remind her that she took it. Pt forgetful. Repeated call lights overnight. Pt had another toast with peanut butter and jelly around 0330. Around 0500, pt's BG was 204.G tube site red, cleaned, dressing changed. Left leg red and swollen, painful to touch, sutures intact, elevated on pillow. Incontinent of urine and BM several times. Pt with positive C.diff. On enteric isolation. Continue to monitor.       At around 0545 , pt yelling out, went in room to check, pt c/o anxiety asking to see doctor. MD jesus Md came to assess pt. Prn hydroxyzine ordered, will give as soon as available. BG checked, it was 55, 120 ml of apple juice given, recheck was 64. Another 120 ml of apple juice given, will recheck in 15 minutes.    No

## 2022-07-12 NOTE — DISCHARGE NOTE PROVIDER - NSDCFUSCHEDAPPT_GEN_ALL_CORE_FT
Aundrea Izquierdo  [facility] Physician [facility]  [facility] [address]  Scheduled Appointment: 08/02/2022

## 2022-07-12 NOTE — DISCHARGE NOTE PROVIDER - NSDCCPTREATMENT_GEN_ALL_CORE_FT
PRINCIPAL PROCEDURE  Procedure: Transthoracic echocardiography (TTE)  Findings and Treatment: 1. Limited study obtained for evaluation of left ventricular function   performed by cardiology fellow on call.   2. Normal left ventricular systolic function.   3. No pericardial effusion.

## 2022-07-12 NOTE — DISCHARGE NOTE PROVIDER - HOSPITAL COURSE
Dx: TREATMENT FOR STEMI or HEART FAILURE THIS ADMISSION: YES/NO            If Yes to STEMI or Heart Failure: 7 day Follow-Up Appointment Made: Yes/No, Yes: Date/Time; IF No, why not?           Cardiac Rehab Indications (STEMI/NSTEMI/ACS/Unstable Angina/CHF/Chronic Stable Angina/Heart Surgery (CABG,Valve)/Post PCI):            *Education on benefits of Cardiac Rehab provided to patient: Yes         *Referral and Prescription Given for Cardiac Rehab: Yes/No.  If No, Why Not?  (see reasons below)         *Pt given list of locations & instructed to contact their insurance company to review list of participating providers. Yes          *Pt instructed to bring Cardiac Rehab prescription with them to Cardiology Follow up appointment for assistance with enrollment: Yes    (Reasons for No Cardiac Rehab Referral Rx - must document 1 or more options):                Patient Refused            Medical Reason: ex: needs Home Care, Home PT, severe or symptomatic AS            Patient lacks medical coverage for Cardiac Rehab            Pt discharged to Nursing Care/[facility]/Long term Care Facility            Patient Lacks Transportation or no cardiac rehab within 60 minutes driving range            Patient already participates in Cardiac Rehab            Other: (provide details) ex: Pt discharged to Hospice; prescription printer not working & pt was instructed to obtain Rx from outpt Cardiologist.    AMI: Beta Blocker Prescribed: Yes/No. If no, Why not?            ACE-I/ARB Prescribed: Yes/No. If no, Why not? ex: Entresto prescribed, Not indicated at this time, worsening renal function  CHF: Beta Blocker Prescribed: Yes/No.  If No, Why Not?           ACE-I/ARB/Entresto Prescribed: Yes/No.  If No, Why Not? ex: hypotension, worsening renal function          Weight on Day of Discharge:     Statin Prescribed (STEMI/NSTEMI/ACS/UA &/OR Post PCI this admission):  Yes/No; If No, No Statin Prescribed due to______  DAPT Post PCI: Prescriptions for Aspirin/Plavix/Brilinta/Effient e-prescribed to patient's pharmacy: Yes/No__.                *No Aspirin/Plavix/Brilinta/Effient prescribed due to ___.    #Discharge: do not delete    Patient is __ yo M/F with past medical history of _____ presented with _____, found to have _____ (one liner)    Hospital course (by problem):     Patient was discharged to: (home/[facility]/acute rehab/hospice, etc, and with what services – home health PT/RN? Home O2?)    New medications:   Changes to old medications:  Medications that were stopped:    Items to follow up as outpatient:    Physical exam at the time of discharge:             Dx: TREATMENT FOR STEMI or HEART FAILURE THIS ADMISSION: YES         If Yes to STEMI or Heart Failure: 7 day Follow-Up Appointment Made: Yes           Cardiac Rehab Indications (STEMI/NSTEMI/ACS/Unstable Angina/CHF/Chronic Stable Angina/Heart Surgery (CABG,Valve)/Post PCI):            Pt referred for home PT.    CHF: Beta Blocker Prescribed: Yes           ACE-I/ARB/Entresto Prescribed: Yes          Weight on Day of Discharge: 112.3    #Discharge: do not delete    85yo F PMH newly dx'ed HFpEF, HTN, hypothryoidism, presented to ED with SOB found to be hypoxemic and hypertensive, likely in flash pulmonary edema, admitted to CCU for acute hypoxic respiratory failure 2/2 CHF exacerbation    Hospital course (by problem):   #Acute on chronic diastolic congestive heart failure.   Pt presented with SOB and hypertension. Likely underlying newly diagnosed HFpEF leading to flash pulmonary edema. Initially required BIPAP, with IV diuresis now on NC and clinically improving.   Home meds losartan 25 qd, lasix 40 qd  - lasix 20mg PO Mon/Wed/Fri; instructed to start this Friday  - c/w home losartan 25mg qd; instructed to start this Wednesday  - toprol 12.5mg qd  - Gen Cards follow up with Dr. Cabrera  - Heart Failure follow up with Dr. Izquierdo    #HTN (hypertension).   -Control of BP as above.    #Hypothyroidism.   - c/w home synthroid 137mcg.    #Rheumatoid arthritis.   Patient does not currently take any home meds.   - outpatient follow up.    Patient was discharged to: home w/ home OT/PT    New medications: toprol  Changes to old medications: lasix decreased dose  Medications that were stopped:    Items to follow up as outpatient: Cardiology follow up    Physical exam at the time of discharge:             Dx: TREATMENT FOR STEMI or HEART FAILURE THIS ADMISSION: YES         If Yes to STEMI or Heart Failure: 7 day Follow-Up Appointment Made: Yes           Cardiac Rehab Indications (STEMI/NSTEMI/ACS/Unstable Angina/CHF/Chronic Stable Angina/Heart Surgery (CABG,Valve)/Post PCI):            Pt referred for home PT.    CHF: Beta Blocker Prescribed: Yes           ACE-I/ARB/Entresto Prescribed: Yes          Weight on Day of Discharge: 112.3    #Discharge: do not delete    87yo F PMH newly dx'ed HFpEF, HTN, hypothryoidism, presented to ED with SOB found to be hypoxemic and hypertensive, likely in flash pulmonary edema, admitted to CCU for acute hypoxic respiratory failure 2/2 CHF exacerbation    Hospital course (by problem):   #Acute on chronic diastolic congestive heart failure.   Pt presented with SOB and hypertension. Likely underlying newly diagnosed HFpEF leading to flash pulmonary edema. Initially in CCU required BIPAP, with IV diuresis now on NC with excellent response to diuretics.  Home meds losartan 25 qd, lasix 40 qd  - lasix 20mg PO Mon/Wed/Fri; instructed to start this Friday (lower dose than home med)  - c/w home losartan 25mg qd; instructed to start this Wednesday  - start toprol 12.5mg qd  - Gen Cards follow up with Dr. Cabrera this week. Team spoke with Dr. Cabrera directly.  - Heart Failure follow up with Dr. Izquierdo: assess need for entresto or farxiga outpatient    #HTN (hypertension).   -Control of BP as above.    #Hypothyroidism.   - c/w home synthroid 137mcg.    #Rheumatoid arthritis.   Patient does not currently take any home meds.   - outpatient follow up.    Patient was discharged to: home w/ home OT/PT    New medications: toprol  Changes to old medications: lasix decreased dose  Medications that were stopped:    Items to follow up as outpatient: Cardiology follow up    Physical exam at the time of discharge:  General: NAD  HEENT: MMM, normal oral cavity  Neck: supple, no JVD appreciated  Cardiovascular: +S1/S2; RRR  Respiratory: CTA b/l; no wheezing, rhonchi  Gastrointestinal: soft, distended, NT  Extremities: WWP; trace LE edema, moving all extremities  Vascular: 2+ radial pulses B/L  Neurological: AAOx3; CN grossly intact, no focal deficits

## 2022-07-12 NOTE — DISCHARGE NOTE PROVIDER - NSDCFUADDAPPT_GEN_ALL_CORE_FT
Please follow up with     Please follow up with Dr. Izquierdo on August 2nd at 2:30PM to monitor your heart failure while taking the new medications. Please follow up with Dr. Cabrera (General Cardiology) to monitor your heart failure. Our team spoke with Dr. Cabrera directly. They are setting you up with an appointment for this week and will call you today to confirm.    Please follow up with Dr. Izquierdo (Heart Failure) on August 2nd at 2:30PM to monitor your heart failure while taking the new medications.

## 2022-07-12 NOTE — DISCHARGE NOTE PROVIDER - CARE PROVIDER_API CALL
Aundrea Izquierdo)  Internal Medicine  [address] [place] [address]  Phone: [phone]  Fax: [phone]  Established Patient  Scheduled Appointment: 08/02/2022 02:30 PM   Aundrea Izquierdo)  Internal Medicine  [address], [address] [place] [id number]  Phone: [phone]  Fax: [phone]  Established Patient  Scheduled Appointment: 08/02/2022 02:30 PM    Remberto Cabrera)  Cardiovascular Disease; Internal Medicine; Nuclear Cardiology  [address], [place] [address]  Phone: [phone]  Fax: [phone]  Established Patient  Follow Up Time: 1 week

## 2022-07-12 NOTE — DISCHARGE NOTE PROVIDER - PROVIDER TOKENS
PROVIDER:[TOKEN:[[id number]],SCHEDULEDAPPT:[08/02/2022],SCHEDULEDAPPTTIME:[02:30 PM],ESTABLISHEDPATIENT:[T]] PROVIDER:[TOKEN:[[id number]],SCHEDULEDAPPT:[08/02/2022],SCHEDULEDAPPTTIME:[02:30 PM],ESTABLISHEDPATIENT:[T]],PROVIDER:[TOKEN:[[id number]],FOLLOWUP:[1 week],ESTABLISHEDPATIENT:[T]]

## 2022-07-13 RX ORDER — METOPROLOL TARTRATE 50 MG
0.5 TABLET ORAL
Qty: 15 | Refills: 3
Start: 2022-07-13 | End: 2022-11-09

## 2022-07-13 RX ORDER — LOSARTAN POTASSIUM 100 MG/1
1 TABLET, FILM COATED ORAL
Qty: 30 | Refills: 3
Start: 2022-07-13 | End: 2022-11-09

## 2022-07-14 ENCOUNTER — INPATIENT (INPATIENT)
Facility: HOSPITAL | Age: 87
LOS: 7 days | Discharge: NURSING, REHAB OR LONG-TERM CARE | DRG: 682 | End: 2022-07-22
Attending: INTERNAL MEDICINE | Admitting: STUDENT IN AN ORGANIZED HEALTH CARE EDUCATION/TRAINING PROGRAM
Payer: MEDICARE

## 2022-07-14 VITALS
HEIGHT: 60 IN | SYSTOLIC BLOOD PRESSURE: 118 MMHG | HEART RATE: 80 BPM | RESPIRATION RATE: 17 BRPM | WEIGHT: 246.04 LBS | DIASTOLIC BLOOD PRESSURE: 78 MMHG | TEMPERATURE: 99 F | OXYGEN SATURATION: 98 %

## 2022-07-14 DIAGNOSIS — D69.6 THROMBOCYTOPENIA, UNSPECIFIED: ICD-10-CM

## 2022-07-14 DIAGNOSIS — D72.825 BANDEMIA: ICD-10-CM

## 2022-07-14 DIAGNOSIS — Z29.9 ENCOUNTER FOR PROPHYLACTIC MEASURES, UNSPECIFIED: ICD-10-CM

## 2022-07-14 DIAGNOSIS — I50.30 UNSPECIFIED DIASTOLIC (CONGESTIVE) HEART FAILURE: ICD-10-CM

## 2022-07-14 DIAGNOSIS — D64.9 ANEMIA, UNSPECIFIED: ICD-10-CM

## 2022-07-14 DIAGNOSIS — Z90.49 ACQUIRED ABSENCE OF OTHER SPECIFIED PARTS OF DIGESTIVE TRACT: Chronic | ICD-10-CM

## 2022-07-14 DIAGNOSIS — R91.1 SOLITARY PULMONARY NODULE: ICD-10-CM

## 2022-07-14 DIAGNOSIS — M54.9 DORSALGIA, UNSPECIFIED: ICD-10-CM

## 2022-07-14 DIAGNOSIS — K86.89 OTHER SPECIFIED DISEASES OF PANCREAS: ICD-10-CM

## 2022-07-14 DIAGNOSIS — Z90.710 ACQUIRED ABSENCE OF BOTH CERVIX AND UTERUS: Chronic | ICD-10-CM

## 2022-07-14 DIAGNOSIS — L89.90 PRESSURE ULCER OF UNSPECIFIED SITE, UNSPECIFIED STAGE: ICD-10-CM

## 2022-07-14 DIAGNOSIS — N17.9 ACUTE KIDNEY FAILURE, UNSPECIFIED: ICD-10-CM

## 2022-07-14 LAB
ALBUMIN SERPL ELPH-MCNC: 3.6 G/DL — SIGNIFICANT CHANGE UP (ref 3.4–5)
ALP SERPL-CCNC: 55 U/L — SIGNIFICANT CHANGE UP (ref 40–120)
ALT FLD-CCNC: 34 U/L — SIGNIFICANT CHANGE UP (ref 12–42)
ANION GAP SERPL CALC-SCNC: 9 MMOL/L — SIGNIFICANT CHANGE UP (ref 9–16)
APPEARANCE UR: CLEAR — SIGNIFICANT CHANGE UP
APTT BLD: 32.8 SEC — SIGNIFICANT CHANGE UP (ref 27.5–35.5)
AST SERPL-CCNC: 37 U/L — SIGNIFICANT CHANGE UP (ref 15–37)
BASOPHILS # BLD AUTO: 0 K/UL — SIGNIFICANT CHANGE UP (ref 0–0.2)
BASOPHILS NFR BLD AUTO: 0 % — SIGNIFICANT CHANGE UP (ref 0–2)
BILIRUB SERPL-MCNC: 0.8 MG/DL — SIGNIFICANT CHANGE UP (ref 0.2–1.2)
BILIRUB UR-MCNC: NEGATIVE — SIGNIFICANT CHANGE UP
BUN SERPL-MCNC: 60 MG/DL — HIGH (ref 7–23)
CALCIUM SERPL-MCNC: 9 MG/DL — SIGNIFICANT CHANGE UP (ref 8.5–10.5)
CHLORIDE SERPL-SCNC: 102 MMOL/L — SIGNIFICANT CHANGE UP (ref 96–108)
CO2 SERPL-SCNC: 29 MMOL/L — SIGNIFICANT CHANGE UP (ref 22–31)
COLOR SPEC: YELLOW — SIGNIFICANT CHANGE UP
CREAT SERPL-MCNC: 2.03 MG/DL — HIGH (ref 0.5–1.3)
CRP SERPL-MCNC: 21 MG/L — HIGH (ref 0–9)
DIFF PNL FLD: NEGATIVE — SIGNIFICANT CHANGE UP
EGFR: 24 ML/MIN/1.73M2 — LOW
EOSINOPHIL # BLD AUTO: 0.06 K/UL — SIGNIFICANT CHANGE UP (ref 0–0.5)
EOSINOPHIL NFR BLD AUTO: 1 % — SIGNIFICANT CHANGE UP (ref 0–6)
FLUAV AG NPH QL: SIGNIFICANT CHANGE UP
FLUBV AG NPH QL: SIGNIFICANT CHANGE UP
GLUCOSE SERPL-MCNC: 126 MG/DL — HIGH (ref 70–99)
GLUCOSE UR QL: NEGATIVE — SIGNIFICANT CHANGE UP
HCT VFR BLD CALC: 31.5 % — LOW (ref 34.5–45)
HGB BLD-MCNC: 10.4 G/DL — LOW (ref 11.5–15.5)
INR BLD: 1.11 — SIGNIFICANT CHANGE UP (ref 0.88–1.16)
KETONES UR-MCNC: NEGATIVE — SIGNIFICANT CHANGE UP
LACTATE SERPL-SCNC: 1.1 MMOL/L — SIGNIFICANT CHANGE UP (ref 0.4–2)
LEUKOCYTE ESTERASE UR-ACNC: NEGATIVE — SIGNIFICANT CHANGE UP
LIDOCAIN IGE QN: 45 U/L — SIGNIFICANT CHANGE UP (ref 7–60)
LYMPHOCYTES # BLD AUTO: 0.84 K/UL — LOW (ref 1–3.3)
LYMPHOCYTES # BLD AUTO: 13 % — SIGNIFICANT CHANGE UP (ref 13–44)
MAGNESIUM SERPL-MCNC: 2.5 MG/DL — SIGNIFICANT CHANGE UP (ref 1.6–2.6)
MCHC RBC-ENTMCNC: 31.9 PG — SIGNIFICANT CHANGE UP (ref 27–34)
MCHC RBC-ENTMCNC: 33 GM/DL — SIGNIFICANT CHANGE UP (ref 32–36)
MCV RBC AUTO: 96.6 FL — SIGNIFICANT CHANGE UP (ref 80–100)
MONOCYTES # BLD AUTO: 0.96 K/UL — HIGH (ref 0–0.9)
MONOCYTES NFR BLD AUTO: 15 % — HIGH (ref 2–14)
NEUTROPHILS # BLD AUTO: 4.5 K/UL — SIGNIFICANT CHANGE UP (ref 1.8–7.4)
NEUTROPHILS NFR BLD AUTO: 59 % — SIGNIFICANT CHANGE UP (ref 43–77)
NITRITE UR-MCNC: NEGATIVE — SIGNIFICANT CHANGE UP
NRBC # BLD: SIGNIFICANT CHANGE UP /100 WBCS (ref 0–0)
PH UR: 5.5 — SIGNIFICANT CHANGE UP (ref 5–8)
PLATELET # BLD AUTO: 140 K/UL — LOW (ref 150–400)
POTASSIUM SERPL-MCNC: 4.3 MMOL/L — SIGNIFICANT CHANGE UP (ref 3.5–5.3)
POTASSIUM SERPL-SCNC: 4.3 MMOL/L — SIGNIFICANT CHANGE UP (ref 3.5–5.3)
PROT SERPL-MCNC: 7.4 G/DL — SIGNIFICANT CHANGE UP (ref 6.4–8.2)
PROT UR-MCNC: NEGATIVE MG/DL — SIGNIFICANT CHANGE UP
PROTHROM AB SERPL-ACNC: 13 SEC — SIGNIFICANT CHANGE UP (ref 10.5–13.4)
RBC # BLD: 3.26 M/UL — LOW (ref 3.8–5.2)
RBC # FLD: 20.7 % — HIGH (ref 10.3–14.5)
RSV RNA NPH QL NAA+NON-PROBE: SIGNIFICANT CHANGE UP
SARS-COV-2 RNA SPEC QL NAA+PROBE: SIGNIFICANT CHANGE UP
SODIUM SERPL-SCNC: 140 MMOL/L — SIGNIFICANT CHANGE UP (ref 132–145)
SP GR SPEC: 1.02 — SIGNIFICANT CHANGE UP (ref 1–1.03)
UROBILINOGEN FLD QL: 0.2 E.U./DL — SIGNIFICANT CHANGE UP
WBC # BLD: 6.43 K/UL — SIGNIFICANT CHANGE UP (ref 3.8–10.5)
WBC # FLD AUTO: 6.43 K/UL — SIGNIFICANT CHANGE UP (ref 3.8–10.5)

## 2022-07-14 PROCEDURE — 99285 EMERGENCY DEPT VISIT HI MDM: CPT

## 2022-07-14 PROCEDURE — 99222 1ST HOSP IP/OBS MODERATE 55: CPT

## 2022-07-14 PROCEDURE — 74176 CT ABD & PELVIS W/O CONTRAST: CPT | Mod: 26

## 2022-07-14 PROCEDURE — 72131 CT LUMBAR SPINE W/O DYE: CPT | Mod: 26

## 2022-07-14 PROCEDURE — 93010 ELECTROCARDIOGRAM REPORT: CPT

## 2022-07-14 PROCEDURE — 99223 1ST HOSP IP/OBS HIGH 75: CPT | Mod: GC

## 2022-07-14 RX ORDER — HYDROMORPHONE HYDROCHLORIDE 2 MG/ML
0.5 INJECTION INTRAMUSCULAR; INTRAVENOUS; SUBCUTANEOUS ONCE
Refills: 0 | Status: DISCONTINUED | OUTPATIENT
Start: 2022-07-14 | End: 2022-07-14

## 2022-07-14 RX ORDER — NYSTATIN CREAM 100000 [USP'U]/G
1 CREAM TOPICAL ONCE
Refills: 0 | Status: COMPLETED | OUTPATIENT
Start: 2022-07-14 | End: 2022-07-14

## 2022-07-14 RX ORDER — PYRIDOXINE HCL (VITAMIN B6) 100 MG
0 TABLET ORAL
Qty: 0 | Refills: 0 | DISCHARGE

## 2022-07-14 RX ORDER — ENOXAPARIN SODIUM 100 MG/ML
40 INJECTION SUBCUTANEOUS EVERY 12 HOURS
Refills: 0 | Status: DISCONTINUED | OUTPATIENT
Start: 2022-07-14 | End: 2022-07-22

## 2022-07-14 RX ORDER — LIDOCAINE 4 G/100G
1 CREAM TOPICAL DAILY
Refills: 0 | Status: DISCONTINUED | OUTPATIENT
Start: 2022-07-14 | End: 2022-07-15

## 2022-07-14 RX ORDER — OXYCODONE HYDROCHLORIDE 5 MG/1
5 TABLET ORAL EVERY 6 HOURS
Refills: 0 | Status: DISCONTINUED | OUTPATIENT
Start: 2022-07-14 | End: 2022-07-15

## 2022-07-14 RX ORDER — METOPROLOL TARTRATE 50 MG
12.5 TABLET ORAL EVERY 24 HOURS
Refills: 0 | Status: DISCONTINUED | OUTPATIENT
Start: 2022-07-14 | End: 2022-07-22

## 2022-07-14 RX ORDER — CHOLECALCIFEROL (VITAMIN D3) 125 MCG
0 CAPSULE ORAL
Qty: 0 | Refills: 0 | DISCHARGE

## 2022-07-14 RX ORDER — CYCLOBENZAPRINE HYDROCHLORIDE 10 MG/1
5 TABLET, FILM COATED ORAL THREE TIMES A DAY
Refills: 0 | Status: DISCONTINUED | OUTPATIENT
Start: 2022-07-14 | End: 2022-07-15

## 2022-07-14 RX ORDER — ACETAMINOPHEN 500 MG
650 TABLET ORAL EVERY 6 HOURS
Refills: 0 | Status: DISCONTINUED | OUTPATIENT
Start: 2022-07-14 | End: 2022-07-14

## 2022-07-14 RX ORDER — ACETAMINOPHEN 500 MG
650 TABLET ORAL EVERY 6 HOURS
Refills: 0 | Status: DISCONTINUED | OUTPATIENT
Start: 2022-07-14 | End: 2022-07-22

## 2022-07-14 RX ORDER — METOPROLOL TARTRATE 50 MG
12.5 TABLET ORAL DAILY
Refills: 0 | Status: DISCONTINUED | OUTPATIENT
Start: 2022-07-14 | End: 2022-07-14

## 2022-07-14 RX ORDER — ONDANSETRON 8 MG/1
4 TABLET, FILM COATED ORAL ONCE
Refills: 0 | Status: COMPLETED | OUTPATIENT
Start: 2022-07-14 | End: 2022-07-14

## 2022-07-14 RX ORDER — LEVOTHYROXINE SODIUM 125 MCG
137 TABLET ORAL EVERY 24 HOURS
Refills: 0 | Status: DISCONTINUED | OUTPATIENT
Start: 2022-07-15 | End: 2022-07-22

## 2022-07-14 RX ADMIN — HYDROMORPHONE HYDROCHLORIDE 0.5 MILLIGRAM(S): 2 INJECTION INTRAMUSCULAR; INTRAVENOUS; SUBCUTANEOUS at 03:43

## 2022-07-14 RX ADMIN — Medication 650 MILLIGRAM(S): at 19:28

## 2022-07-14 RX ADMIN — Medication 12.5 MILLIGRAM(S): at 18:03

## 2022-07-14 RX ADMIN — ONDANSETRON 4 MILLIGRAM(S): 8 TABLET, FILM COATED ORAL at 03:44

## 2022-07-14 RX ADMIN — HYDROMORPHONE HYDROCHLORIDE 0.5 MILLIGRAM(S): 2 INJECTION INTRAMUSCULAR; INTRAVENOUS; SUBCUTANEOUS at 10:12

## 2022-07-14 RX ADMIN — NYSTATIN CREAM 1 APPLICATION(S): 100000 CREAM TOPICAL at 20:29

## 2022-07-14 RX ADMIN — ENOXAPARIN SODIUM 40 MILLIGRAM(S): 100 INJECTION SUBCUTANEOUS at 18:02

## 2022-07-14 RX ADMIN — CYCLOBENZAPRINE HYDROCHLORIDE 5 MILLIGRAM(S): 10 TABLET, FILM COATED ORAL at 15:26

## 2022-07-14 RX ADMIN — Medication 650 MILLIGRAM(S): at 18:03

## 2022-07-14 NOTE — ED ADULT TRIAGE NOTE - CHIEF COMPLAINT QUOTE
BIBA from home c/o severe lower back pain since 5pm. per pt, she was taking a nap and when she went to get out of bed the back pain started. denies falls or injury/trauma.

## 2022-07-14 NOTE — ED PROVIDER NOTE - CLINICAL SUMMARY MEDICAL DECISION MAKING FREE TEXT BOX
pt d/c'd from [facility] for CHF exacerbation yesterday, now with atraumatic back pain, no focal neuro deficits on exam, afebrile, 99F AK, noted pain requiring multiple doses of IV analgesics for pain, labs with no leukocytosis but noted bandemia of 11%, renal function worsened compared to day prior, unable to obtain imaging with IV contrast. CT A/P w L spine reveals pt d/c'd from [facility] for CHF exacerbation yesterday, now with atraumatic back pain, no focal neuro deficits on exam, afebrile, 99F UT, noted pain requiring multiple doses of IV analgesics for pain, labs with no leukocytosis but noted bandemia of 11%, pan cultured in the ED, renal function worsened compared to day prior, unable to obtain imaging with IV contrast. CT A/P w L spine reveals multi level DJD spinal changes, with no s/s of cord compression, incidental findings of large cystic structure arising from pancreas, ?mass effect on intra-abdominal region with compression and exacerbation of back pain. Will need further MRI abd/spine for evaluation, low suspicious for disciitis/epidural abscess based on exam/clinical presentation.  [facility] Dr[facility]real[facility]

## 2022-07-14 NOTE — H&P ADULT - PROBLEM SELECTOR PLAN 10
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

## 2022-07-14 NOTE — H&P ADULT - PROBLEM SELECTOR PLAN 2
no leukocytosis (WBC: 6.43) with bandemia 11%. Patient afebrile with no sx of active infection. Metamylocytes, platelet clumping   - CRP 21   - UA negative for UTI, RVP negative; no focal consolidations on CXR  - continue to trend CBC w diff no leukocytosis (WBC: 6.43) with bandemia 11%. Patient afebrile with no sx of active infection. Metamylocytes, platelet clumping   - CRP 21   - UA negative for UTI, RVP negative; no focal consolidations on CXR from a few days ago   - continue to trend CBC w diff  - fup blood cultures

## 2022-07-14 NOTE — ED PROVIDER NOTE - ATTENDING APP SHARED VISIT CONTRIBUTION OF CARE
Agree w PA note.  Pt w recent admission for CHF exacerbation presents with atraumatic back pain without neuro deficits. Found to have HERNESTO, cystic structure on pancreas noted on CT a/p. Given analgesia in ED. Admit to  for further workup and pain mgmt.

## 2022-07-14 NOTE — H&P ADULT - PROBLEM SELECTOR PLAN 5
Hgb: 10.4 with RDW 20.7   most likely 2/2 to iron deficiency   - monitor CBC  - f/up iron studies Pt with known pressure ulcer on sacrum   - stage 2   - wound care consulted  - f/up wound care recs

## 2022-07-14 NOTE — H&P ADULT - PROBLEM SELECTOR PLAN 8
Patient on Lasix 20 mg PO M/W/F and losartan 25 mg qd; Toprol 12.5 qd at home  - recent admission to [facility] due to HF exacerbation   - c/w home meds toprol  - hold lasix and losartan in the setting of HERNESTO   - DASH/TLC diet

## 2022-07-14 NOTE — H&P ADULT - PROBLEM SELECTOR PLAN 3
Pt reports acute back pain after quick movement   - on CT:  complex cystic mass in the upper abdomen measuring up to 12.1 cm which appears to arise from the body of the pancreas   Advanced lumbar spondylosis, most severe at L4-L5 with grade 1 anterolisthesis contributing to spinal stenosis; more moderate spinal stenosis at L1-2 from posterior osteophyte ridge; see additional  detail above. Also, spinal stenosis at T9-10 with ventral calcified focus that may be calcified disc extrusion or possible meningioma  - most likely 2/2 to muscle spasm   - c/w lidocaine patch and cyclobenzaprine prn  -f up cancer biomarkers: CEA and   -f/up outpatient for abdominal MRI (pancreatic protocol, without and with contrast) Pt reports acute back pain after quick movement   - on CT: complex cystic mass in the upper abdomen measuring up to 12.1 cm which appears to arise from the body of the pancreas   Advanced lumbar spondylosis, most severe at L4-L5 with grade 1 anterolisthesis contributing to spinal stenosis; more moderate spinal stenosis at L1-2 from posterior osteophyte ridge; see additional  detail above. Also, spinal stenosis at T9-10 with ventral calcified focus that may be calcified disc extrusion or possible meningioma  - most likely 2/2 to muscle spasm   - c/w lidocaine patch, cyclobenzaprine prn, oxycodone q6 orn   -f up cancer biomarkers: CEA and   -f/up outpatient for abdominal MRI (pancreatic protocol, without and with contrast) Pt reports acute back pain after quick movement   - on CT: complex cystic mass in the upper abdomen measuring up to 12.1 cm which appears to arise from the body of the pancreas   Advanced lumbar spondylosis, most severe at L4-L5 with grade 1 anterolisthesis contributing to spinal stenosis; more moderate spinal stenosis at L1-2 from posterior osteophyte ridge; see additional  detail above. Also, spinal stenosis at T9-10 with ventral calcified focus that may be calcified disc extrusion or possible meningioma  - most likely 2/2 to muscle spasm   - c/w lidocaine patch, cyclobenzaprine prn, Tylenol, oxycodone q6 prn  -f up cancer biomarkers: CEA and   -f/up outpatient for abdominal MRI (pancreatic protocol, without and with contrast) Pt reports acute back pain after quick movement   - on CT: complex cystic mass in the upper abdomen measuring up to 12.1 cm which appears to arise from the body of the pancreas   Advanced lumbar spondylosis, most severe at L4-L5 with grade 1 anterolisthesis contributing to spinal stenosis; more moderate spinal stenosis at L1-2 from posterior osteophyte ridge; see additional  detail above. Also, spinal stenosis at T9-10 with ventral calcified focus that may be calcified disc extrusion or possible meningioma  - most likely 2/2 to muscle spasm   - c/w lidocaine patch, cyclobenzaprine prn, Tylenol, oxycodone q6 prn  -f up cancer biomarkers: CEA and   -f/up outpatient for abdominal MRI (pancreatic protocol, without and with contrast)  - follow up neurgery recs (consulted for severe stenosis and possible meningioma)

## 2022-07-14 NOTE — CONSULT NOTE ADULT - SUBJECTIVE AND OBJECTIVE BOX
NEUROSURGERY CONSULT NOTE  HISTORY OF PRESENT ILLNESS:   Patient is an 86 F with PMH HFpEF (last ECHO ) , HTN, RA, hypothyroidism, and sacral decubitus ulcer who presented to ED w atraumatic back pain. Pt reports pain occurred after quick movement following a nap yesterday. Patient reports feeling a sharp stabbing pain over b/l lower back. Pain does not radiate down her extremities or have any numbness or tingling of extremities. Pain is worse with movement, however feels a dully, aching pain at rest. Reports taking ibuprofen without relief and is unable to ambulate due to the pain. She does not endorse weakness of extremities but extreme pain with movement.     Patient denies any weight changes, fever/chills, nausea, vomiting, diaphoresis, headache, sore throat, chest pain, palpitations, LE edema, dyspnea, cough, wheezing, changes in appetite, constipation, diarrhea, abdominal pain, burning on urination, urinary frequency, dizziness, or fainting/LOC, fall or recent trauma changes in sensation, acute extremity weakness, saddle anesthesia, bowel or urinary incontinence.     Neurosurgery consulted for findings of advance lumbar spondylosis with grade 1 anterolisthesis at L4-L5 and moderate spinal stenosis at L1-L2. Also found to have spinal stenosis at T9-T10 with ventral calcified focus that may be disc calcification vs meningioma.      PAST MEDICAL & SURGICAL HISTORY:  Rheumatoid arthritis      Fluid retention      Hypothyroidism      H/O CHF      S/P appendectomy      S/P cholecystectomy      S/P hysterectomy        FAMILY HISTORY: Endorses family history of maternal breast cancer and paternal colon cancer       SOCIAL HISTORY:  Tobacco Use:  denies   EtOH use:  denies   Substance: denies     Allergies    Levaquin (Hives)    Intolerances        REVIEW OF SYSTEMS  Listed in above HPI     MEDICATIONS:  Antibiotics:    Neuro:  acetaminophen     Tablet .. 650 milliGRAM(s) Oral every 6 hours  cyclobenzaprine 5 milliGRAM(s) Oral three times a day PRN  oxyCODONE    IR 5 milliGRAM(s) Oral every 6 hours PRN    Anticoagulation:  enoxaparin Injectable 40 milliGRAM(s) SubCutaneous every 12 hours    OTHER:  lidocaine   4% Patch 1 Patch Transdermal daily PRN  metoprolol succinate ER 12.5 milliGRAM(s) Oral every 24 hours  nystatin Powder 1 Application(s) Topical once    IVF:      Vital Signs Last 24 Hrs  T(C): 36.9 (2022 12:20), Max: 37.1 (2022 00:19)  T(F): 98.5 (2022 12:20), Max: 98.7 (2022 00:19)  HR: 86 (2022 12:20) (65 - 86)  BP: 100/66 (2022 12:20) (100/66 - 119/75)  BP(mean): --  RR: 17 (2022 12:20) (16 - 18)  SpO2: 94% (2022 12:20) (94% - 98%)    Parameters below as of 2022 12:20  Patient On (Oxygen Delivery Method): nasal cannula  O2 Flow (L/min): 2      PHYSICAL EXAM:  General: NAD, morbidly obese patient, lying uncomfortably in bed, on NC  HEENT: CN II-XII grossly intact, PERRL 3mm briskly reactive, EOMI b/l, face symmetric, tongue midline, neck FROM  Cardiovascular: RRR, +murmur   Respiratory:  Diminished lung sounds, no wheezing, rhonchi, or crackles   GI: normoactive BS to auscultation, abd soft, NTND   Neuro: A&Ox3, No aphasia, speech clear, no dysmetria, no pronator drift. Follows commands.  BRITT x4 spontaneously, UE bilaterally 5/5 strength throughout, Bilateral LE pain limited, with assistance HF 4/5, KF/KE isolated 5/5, DF/PF 5/5, EHL 5/5. SILT throughout. Negative hoffmans, negative clonus, normoreflexic throughout.   Extremities: distal pulses 2+ x4    LABS:                        10.4   6.43  )-----------( 140      ( 2022 03:22 )             31.5     07-14    140  |  102  |  60<H>  ----------------------------<  126<H>  4.3   |  29  |  2.03<H>    Ca    9.0      2022 03:22  Mg     2.5     07-14    TPro  7.4  /  Alb  3.6  /  TBili  0.8  /  DBili  x   /  AST  37  /  ALT  34  /  AlkPhos  55  07-14    PT/INR - ( 2022 03:24 )   PT: 13.0 sec;   INR: 1.11          PTT - ( 2022 03:24 )  PTT:32.8 sec  Urinalysis Basic - ( 2022 03:24 )    Color: Yellow / Appearance: Clear / S.020 / pH: x  Gluc: x / Ketone: NEGATIVE  / Bili: NEGATIVE / Urobili: 0.2 E.U./dL   Blood: x / Protein: NEGATIVE mg/dL / Nitrite: NEGATIVE   Leuk Esterase: NEGATIVE / RBC: x / WBC x   Sq Epi: x / Non Sq Epi: x / Bacteria: x      CULTURES:      RADIOLOGY & ADDITIONAL STUDIES:  < from: CT Lumbar Spine No Cont (22 @ 05:03) >  FINDINGS:  Bones/joints: The bones are diffusely demineralized. No fractures are   identified. Lower thoracic and lumbar vertebral bodies maintain normal   height.  Alignment: There is grade 1 retrolisthesis of L1 on L2 and grade 1   anterolisthesis of L4 on L5, the latter in the presence of advanced facet   arthritis.  view shows mild levocurvature.  Discs/Spinal canal/Neural foramina: There is multilevel loss of   intervertebral disc height throughout the lower thoracic and lumbar   spine, most advanced at L1-L2 and L5-S1 where the discs are severely   diminished in height, with vacuum disc phenomenon and disc osteophyte   complex formation. There is moderate acquired central canal narrowing at   T9-T10 secondary to a calcified 1.4 x 1.2 focus, possibly an ossified   extruded disc fragment or possibly burnt out meningioma. At L1-2, there   is posterior disc-osteophyte ridging and ligamentum flavum thickening   with mild-moderate spinal stenosis. There is moderate-severe  acquired   central canal narrowing at L4-L5 secondary to a combination of a disc   bulge with thickening of the ligamentum flavum and geometric distortion   of the central canal by anterolisthesis. Advanced multilevel facet   degeneration is noted throughout the lumbar spine, most severe at L4-L5.   Neural foraminal narrowing is present throughout, right asymmetric mostly   from facet arthritis.    Soft tissues: No acute or suspicious abnormality in the paraspinal soft   tissues.  Other findings: Sigmoid diverticulosis. Mild atherosclerotic vascular   calcifications.    IMPRESSION:    1. No acute abnormality in the lumbar spine.  2. Advanced lumbar spondylosis, most severe at L4-L5 with grade 1   anterolisthesis contributing to spinal stenosis; more moderate spinal   stenosis at L1-2 from posterior osteophyte ridge; see additional  detail   above. Also, spinal stenosis at T9-10 with ventral calcified focus that   may be calcified disc extrusion or possible meningioma.    < end of copied text >      Assessment:  Patient is an 86 F with PMH HFpEF (last ECHO ) , HTN, RA, hypothyroidism, and sacral decubitus ulcer who presented to ED with atraumatic back pain that is worse with movement, non-radiating and unrelieved with NSAIDS. CT Lumbar spine demonstrating lumbar spinal stenosis and ventral calcified disc vs. meningioma at T9/T10.     Plan:  - Care per primary team  - Pain control (can consider oxycodone 5mg/10mg for moderate and severe pain q4 hours), flexeril or robaxin for muscle spasm and can also consider lyrica if patient complains of neuropathic type pain.   - Please obtain MRI Thoracic and lumbar spine without contrast     Please call neurosurgery once MRI obtained or with any acute neurological changes at [phone]    Assessment and plan discussed with Dr. Ramos

## 2022-07-14 NOTE — ED PROVIDER NOTE - CARE PLAN
Principal Discharge DX:	Intractable back pain  Secondary Diagnosis:	HERNESTO (acute kidney injury)   1 Principal Discharge DX:	Intractable back pain  Secondary Diagnosis:	HERNESTO (acute kidney injury)  Secondary Diagnosis:	Pancreatic mass

## 2022-07-14 NOTE — H&P ADULT - PROBLEM SELECTOR PLAN 1
Patient recently admitted for CHF. Last dose of lasix: 7/13. Since discharge, patient reports significantly decreased fluid intake. Patient states she has had no problems with urination, however significantly reduced given decreased intake. On PE, no LE edema or adventitial breath sounds. In ED: BUN: 60; Cr: 2.03  - most likely prerenal 2/2 to reduced PO intake and in the setting of lasix use   - omalley catheter placed in ED w urination   - unremarkable UA   - tx w 1 L IVF   - f/up bladder scan   - f/up urine lytes (urea and cr)   - c/w daily BMP Patient recently admitted for CHF. Last dose of lasix: 7/13. Since discharge, patient reports significantly decreased fluid intake. Patient states she has had no problems with urination, however significantly reduced given decreased intake. On PE, no LE edema or adventitial breath sounds. In ED: BUN: 60; Cr: 2.03  - most likely prerenal 2/2 to reduced PO intake and in the setting of lasix use   - omalley catheter placed in ED w urination   - unremarkable UA   - encouraged increased PO fluid intake   - nephrotoxic medications held (losartan, lasix)   - f/up bladder scan   - f/up urine lytes (urea and cr)   - c/w daily BMP Patient recently admitted for CHF. Last dose of lasix: 7/13. Since discharge, patient reports significantly decreased fluid intake. Patient states she has had no problems with urination, however significantly reduced given decreased intake. On PE, no LE edema or adventitial breath sounds. In ED: BUN: 60; Cr: 2.03  - most likely prerenal 2/2 to reduced PO intake and in the setting of lasix use   - unremarkable UA   - encouraged increased PO fluid intake   - nephrotoxic medications held (losartan, lasix)   - f/up urine lytes (urea and cr)   - c/w daily BMP

## 2022-07-14 NOTE — PATIENT PROFILE ADULT - FALL HARM RISK - HARM RISK INTERVENTIONS

## 2022-07-14 NOTE — H&P ADULT - PROBLEM SELECTOR PLAN 6
platelets: 140   most likely reactive   - continue to trend with CBC Hgb: 10.4 with RDW 20.7   most likely 2/2 to iron deficiency   - monitor CBC  - f/up iron studies

## 2022-07-14 NOTE — H&P ADULT - ASSESSMENT
87 yo female with PMH HFpEF (last ECHO 7/9) , HTN, RA, hypothyroidism, and sacral decubitus ulcer who presented to ED w atraumatic back pain, recently admitted for acute hypoxic resp failure 2/2 to CHF exacerbation, admitted for HERNESTO and pain management  87 yo female with PMH HFpEF (last ECHO 7/9) who presented to ED w atraumatic back pain, recently admitted for acute hypoxic resp failure 2/2 to CHF exacerbation, admitted for HERNESTO and pain management  85 yo female with PMH HFpEF (last ECHO 7/9) who presented to ED w atraumatic back pain, recently admitted for acute hypoxic resp failure 2/2 to CHF exacerbation, admitted for pre renal HERNESTO, and severe back pain likely 2/2 muscle spasm and inability to ambulate.

## 2022-07-14 NOTE — H&P ADULT - HISTORY OF PRESENT ILLNESS
Patient is an 86 F with PMH HFpEF, HTN, RA, hypothyroidism, and sacral decubitus ulcer who presented to ED w atraumatic back pain. Pt reports:      Of note, pt was admitted to [facility] from 7/9-7/12 for acute hypoxic respiratory failure 2/2 CHF exacerbation    In ED: T: 98.5; HR: 78; BP: 106/52; RR: 18; 97% on 2L NC  Labs: WBC: 6.43 with 11% bandemia (neut); RBC: 3.26; Hgb: 10.4; Hct: 31.5; RDW: 20.7; PLT: 140; BUN: 60; Cr: 2.03 (baseline 0.9); GFR: 24; CRP: 21; lactate: 1.1  UA: negative; RVP: negative   Imaging:   CT abd/pelvis: 1. Large complex cystic mass in the upper abdomen measuring up to 12.1 cm which appears to arise from the body of the pancreas. Further characterization   with nonemergent abdominal MRI (pancreatic protocol, without and with contrast) is recommended.  2. Incidental 6 mm solid left lower lobe nodule. See follow up recommendations below.  3. Sigmoid diverticulosis without evidence of diverticulitis.  CT lumbar spine: 1. No acute abnormality in the lumbar spine.  2. Advanced lumbar spondylosis, most severe at L4-L5 with grade 1 anterolisthesis contributing to spinal stenosis; more moderate spinal stenosis at L1-2 from posterior osteophyte ridge; see additional  detail above. Also, spinal stenosis at T9-10 with ventral calcified focus that may be calcified disc extrusion or possible meningioma.  Interventions:            Patient is an 86 F with PMH HFpEF (last ECHO 7/9) , HTN, RA, hypothyroidism, and sacral decubitus ulcer who presented to ED w atraumatic back pain. Pt reports pain occurred after quick movement following a nap yesterday. Patient reports feeling a sharp stabbing pain over b/l lower back. Pain does not radiate. Pain is worse with movement, however feels a dully, aching pain at rest. Patient has never felt a pain like this before. Patient denies any weight changes, fever/chills, nausea, vomiting, diaphoresis, headache, sore throat, chest pain, palpitations, LE edema, dyspnea, cough, wheezing, changes in appetite, constipation, diarrhea, abdominal pain, burning on urination, urinary frequency, dizziness, or fainting/LOC.   Patient reports mother with breast cancer and father with metastatic colon cancer.     Of note, pt was admitted to [facility] from 7/9-7/12 for acute hypoxic respiratory failure 2/2 CHF exacerbation    In ED: T: 98.5; HR: 78; BP: 106/52; RR: 18; 97% on 2L NC  Labs: WBC: 6.43 with 11% bandemia (neut); RBC: 3.26; Hgb: 10.4; Hct: 31.5; RDW: 20.7; PLT: 140; BUN: 60; Cr: 2.03 (baseline 0.9); GFR: 24; CRP: 21; lactate: 1.1  UA: negative; RVP: negative   Imaging:   CT abd/pelvis: 1. Large complex cystic mass in the upper abdomen measuring up to 12.1 cm which appears to arise from the body of the pancreas. Further characterization   with nonemergent abdominal MRI (pancreatic protocol, without and with contrast) is recommended.  2. Incidental 6 mm solid left lower lobe nodule. See follow up recommendations below.  3. Sigmoid diverticulosis without evidence of diverticulitis.  CT lumbar spine: 1. No acute abnormality in the lumbar spine.  2. Advanced lumbar spondylosis, most severe at L4-L5 with grade 1 anterolisthesis contributing to spinal stenosis; more moderate spinal stenosis at L1-2 from posterior osteophyte ridge; see additional  detail above. Also, spinal stenosis at T9-10 with ventral calcified focus that may be calcified disc extrusion or possible meningioma.

## 2022-07-14 NOTE — H&P ADULT - NSHPPHYSICALEXAM_GEN_ALL_CORE
general: morbidly obese, AAOX3  HEENT: NCAT, no nystagmus, nasal passages clear, no JVD, dry mucus membranes, no thyroid enlargement  CV: RRR, normal s1, s2; +3/6 systolic murmurs appreciated at LUSB , LLSB, and at the apex  resp: normal effort, no W/R/R, symmetrical inhalation/exhalation   GI: nondistended, normal BS x4 quadrants, bruising noted in lower quadrants, no tenderness; + dermatitis located under abdominal skin fold; organomegaly hard to appreciate due to body habitus  Skin: dry and warm ; stage 2 pressure ulcer located on sacral region   extremities: no LE edema, b/l UE and LE 2+ pulses   Neuro: CN 2-12 grossly intact; light touch sensation intact general: morbidly obese, AAOX3  HEENT: NCAT, no nystagmus, nasal passages clear, no JVD, dry mucus membranes, no thyroid enlargement  CV: RRR, normal s1, s2; +3/6 systolic murmurs appreciated at LUSB , LLSB, and at the apex  resp: normal effort, no W/R/R, symmetrical inhalation/exhalation   GI: nondistended, normal BS x4 quadrants, bruising noted in lower quadrants, no tenderness; + dermatitis located under abdominal skin fold; organomegaly hard to appreciate due to body habitus  Skin: dry and warm ; stage 2 pressure ulcer located on sacral region   extremities: no LE edema, b/l UE and LE 2+ pulses   Neuro: CN 2-12 grossly intact; light touch sensation intact  MSK: paraspinal muscle hypertonicity and tenderness T-9-T12 b/l

## 2022-07-14 NOTE — H&P ADULT - PROBLEM SELECTOR PLAN 9
6 mm solid L lower lobe nodule  no hx smoking   fup CT chest at 6-12 mos outpatient -6 mm solid L lower lobe nodule; no hx smoking   -fup CT chest at 6-12 mos outpatient

## 2022-07-14 NOTE — H&P ADULT - PROBLEM SELECTOR PLAN 7
Patient on Lasix 20 mg PO M/W/F and losartan 25 mg qd; Toprol 12.5 qd at home  - recent admission to [facility] due to HF exacerbation   - c/w home meds  - DASH/TLC diet platelets: 140   most likely reactive   - continue to trend with CBC

## 2022-07-14 NOTE — ED PROVIDER NOTE - PHYSICAL EXAMINATION
Gen - WDWN elderly F, BMI>45, NAD, comfortable and non-toxic appearing  Skin - warm, dry, stage I sacral decub with no active bleeding or drainage   HEENT - AT/NC, no nasal discharge, airway patent, neck supple and FROM  CV - S1S2, R/R/R  Resp - CTAB, no r/r/w  GI - soft, ND, NT, no CVAT b/l   MS - w/w/p, 1+ BLE pitting edema, + paraspinal tenderness in the lumbar region, no midline tenderness, step off, crepitus, erythema, edema, ecchymosis, or deformity, +straight leg test b/l   - rectal tone intact, no saddle anesthesia    Neuro - AxOx3, no focal neuro deficits, +SILT, symmetric strength BUE/BLE, unable to ambulate

## 2022-07-14 NOTE — ED PROVIDER NOTE - OBJECTIVE STATEMENT
86y F with PMHx of CHF, recently admitted to [facility] from 7/9-12 for acute on chronic CHF exacerbation, currently on lasix 20mg daily, rheumatoid arthritis, hypothyroidism, sacral decubitus, BIBA for sudden onset of lower back pain since 5pm yesterday.  Pt reports sudden onset of sharp pulling pain in the lower back region as she was trying to sit up in bed when she woke up from a nap. Noted pain is constant, radiates to b/l lower back with associated pulling and twitching sensation and worse with any movements.  No prior episodes of similar pain reported. Denies fever, chills, dysuria, hematuria, flank pain, change in urinary/bowel function, numbness, tingling, focal weakness, abdominal pain, N/V/D/C, melena, hematochezia, CP, SOB, palpitations, diaphoresis, dizziness, HA, cough, rash, trauma, and fall.

## 2022-07-14 NOTE — H&P ADULT - ATTENDING COMMENTS
85 yo female with PMH HFpEF (last ECHO 7/9) who presented to ED w atraumatic back pain, recently admitted for acute hypoxic resp failure 2/2 to CHF exacerbation, admitted for HERNESTO and pain management for intractable back pain    #Intractable back pain  - patient with sudden onset intractable back pain after lying on couch located in lumbar spine.  Patient able to tolerate minimal movement, ROM of LEs limited by pain but sensation and pulses intact, no loss of bowel or bladder function  - CT spine with multiple levels of mod to severe spinal stenosis, calcified disc extrusion or possible meningioma.  Neurosurgery consult for eval of possible meningioma, appreciate recs  - DDx includes musculoskeletal back spasm but has had poor response to muscle relaxers and opioids, infectious given Bandemia of 11% although no elevated WBC or fevers, no known recent infection  - f/u MR imaging of spine to eval for meningioma as well as infectious process, unable to have CTA due to ARF  - if spikes fever would start broad spectrum abx, f/u blood cultures   - can consider glucocorticoids if infectious workup negative and not improving with muscle relaxants    #ARF - Cr 2 from base of < 1 with acute drop in GFR.  Likely pre renal given recent diuresis, poor PO intake at home  - f/u urine lytes  - patient has omalley placed in ED, monitor Is and Os  - avoiding IVF at this time given recent CHF exacerbation  - hold lasix and losartan    #Pancreatic mass  - likely incidental finding  - per further history patient was previously told about a pancreatic lesion in the 1980s but did not have follow up imaging    #Anemia - f/u iron studies  #HFpEF - hold home lasix due to HERNESTO, cautious use of fluids  #Lung nodule - outpatient follow up  #Pressure wound - present on admission    Remainder of plan as above

## 2022-07-14 NOTE — H&P ADULT - PROBLEM SELECTOR PLAN 4
Pt with known pressure ulcer on sacrum   - wound care consulted  - f/up wound care recs large complex cystic mass from body of pancreas 12 cm  pt reports remote history of foloowing pancreatic mass with MR1. Pt reports not having follow up in years.   - -f up cancer biomarkers: CEA and   --f/up outpatient for abdominal MRI (pancreatic protocol, without and with contrast)

## 2022-07-14 NOTE — ED ADULT NURSE NOTE - OBJECTIVE STATEMENT
Pt presents to ED complaining of acute onset low back pain this evening. Pt was recently hospitalized for acute onset of CHF, and dc'd yesterday and is now compliant with medications. Denies loss of bowel or bladder. Denies numbness or tingling to legs. Walks with walker.

## 2022-07-14 NOTE — ED PROVIDER NOTE - NS ED ATTENDING STATEMENT MOD
This was a shared visit with the PANDA. I reviewed and verified the documentation and independently performed the documented:

## 2022-07-15 DIAGNOSIS — L30.4 ERYTHEMA INTERTRIGO: ICD-10-CM

## 2022-07-15 DIAGNOSIS — E03.9 HYPOTHYROIDISM, UNSPECIFIED: ICD-10-CM

## 2022-07-15 DIAGNOSIS — E66.01 MORBID (SEVERE) OBESITY DUE TO EXCESS CALORIES: ICD-10-CM

## 2022-07-15 DIAGNOSIS — J96.01 ACUTE RESPIRATORY FAILURE WITH HYPOXIA: ICD-10-CM

## 2022-07-15 DIAGNOSIS — R78.81 BACTEREMIA: ICD-10-CM

## 2022-07-15 LAB
ANION GAP SERPL CALC-SCNC: 10 MMOL/L — SIGNIFICANT CHANGE UP (ref 5–17)
ANISOCYTOSIS BLD QL: SLIGHT — SIGNIFICANT CHANGE UP
BASOPHILS # BLD AUTO: 0.05 K/UL — SIGNIFICANT CHANGE UP (ref 0–0.2)
BASOPHILS NFR BLD AUTO: 0.9 % — SIGNIFICANT CHANGE UP (ref 0–2)
BUN SERPL-MCNC: 49 MG/DL — HIGH (ref 7–23)
CALCIUM SERPL-MCNC: 8.8 MG/DL — SIGNIFICANT CHANGE UP (ref 8.4–10.5)
CANCER AG19-9 SERPL-ACNC: 14 U/ML — SIGNIFICANT CHANGE UP
CEA SERPL-MCNC: 2.5 NG/ML — SIGNIFICANT CHANGE UP (ref 0–3.8)
CHLORIDE SERPL-SCNC: 100 MMOL/L — SIGNIFICANT CHANGE UP (ref 96–108)
CO2 SERPL-SCNC: 29 MMOL/L — SIGNIFICANT CHANGE UP (ref 22–31)
CREAT SERPL-MCNC: 1.42 MG/DL — HIGH (ref 0.5–1.3)
CULTURE RESULTS: NO GROWTH — SIGNIFICANT CHANGE UP
EGFR: 36 ML/MIN/1.73M2 — LOW
EOSINOPHIL # BLD AUTO: 0.04 K/UL — SIGNIFICANT CHANGE UP (ref 0–0.5)
EOSINOPHIL NFR BLD AUTO: 0.8 % — SIGNIFICANT CHANGE UP (ref 0–6)
FERRITIN SERPL-MCNC: 739 NG/ML — HIGH (ref 15–150)
GIANT PLATELETS BLD QL SMEAR: PRESENT — SIGNIFICANT CHANGE UP
GLUCOSE SERPL-MCNC: 104 MG/DL — HIGH (ref 70–99)
GRAM STN FLD: SIGNIFICANT CHANGE UP
HCT VFR BLD CALC: 30.3 % — LOW (ref 34.5–45)
HGB BLD-MCNC: 10 G/DL — LOW (ref 11.5–15.5)
HYPOCHROMIA BLD QL: SLIGHT — SIGNIFICANT CHANGE UP
IRON SATN MFR SERPL: 14 % — SIGNIFICANT CHANGE UP (ref 14–50)
IRON SATN MFR SERPL: 21 UG/DL — LOW (ref 30–160)
LYMPHOCYTES # BLD AUTO: 0.44 K/UL — LOW (ref 1–3.3)
LYMPHOCYTES # BLD AUTO: 8.6 % — LOW (ref 13–44)
MACROCYTES BLD QL: SLIGHT — SIGNIFICANT CHANGE UP
MAGNESIUM SERPL-MCNC: 2.4 MG/DL — SIGNIFICANT CHANGE UP (ref 1.6–2.6)
MANUAL SMEAR VERIFICATION: SIGNIFICANT CHANGE UP
MCHC RBC-ENTMCNC: 32.3 PG — SIGNIFICANT CHANGE UP (ref 27–34)
MCHC RBC-ENTMCNC: 33 GM/DL — SIGNIFICANT CHANGE UP (ref 32–36)
MCV RBC AUTO: 97.7 FL — SIGNIFICANT CHANGE UP (ref 80–100)
METHOD TYPE: SIGNIFICANT CHANGE UP
MICROCYTES BLD QL: SLIGHT — SIGNIFICANT CHANGE UP
MONOCYTES # BLD AUTO: 0.67 K/UL — SIGNIFICANT CHANGE UP (ref 0–0.9)
MONOCYTES NFR BLD AUTO: 12.9 % — SIGNIFICANT CHANGE UP (ref 2–14)
MYELOCYTES NFR BLD: 0.9 % — HIGH (ref 0–0)
NEUTROPHILS # BLD AUTO: 3.92 K/UL — SIGNIFICANT CHANGE UP (ref 1.8–7.4)
NEUTROPHILS NFR BLD AUTO: 75.9 % — SIGNIFICANT CHANGE UP (ref 43–77)
OVALOCYTES BLD QL SMEAR: SLIGHT — SIGNIFICANT CHANGE UP
PHOSPHATE SERPL-MCNC: 3.9 MG/DL — SIGNIFICANT CHANGE UP (ref 2.5–4.5)
PLAT MORPH BLD: ABNORMAL
PLATELET # BLD AUTO: 131 K/UL — LOW (ref 150–400)
POLYCHROMASIA BLD QL SMEAR: SLIGHT — SIGNIFICANT CHANGE UP
POTASSIUM SERPL-MCNC: 3.9 MMOL/L — SIGNIFICANT CHANGE UP (ref 3.5–5.3)
POTASSIUM SERPL-SCNC: 3.9 MMOL/L — SIGNIFICANT CHANGE UP (ref 3.5–5.3)
RBC # BLD: 3.1 M/UL — LOW (ref 3.8–5.2)
RBC # FLD: 20.5 % — HIGH (ref 10.3–14.5)
RBC BLD AUTO: ABNORMAL
S LUGDUNENSIS DNA SNV QL NAA+NON-PROBE: SIGNIFICANT CHANGE UP
SCHISTOCYTES BLD QL AUTO: SLIGHT — SIGNIFICANT CHANGE UP
SODIUM SERPL-SCNC: 139 MMOL/L — SIGNIFICANT CHANGE UP (ref 135–145)
SPECIMEN SOURCE: SIGNIFICANT CHANGE UP
SPHEROCYTES BLD QL SMEAR: SLIGHT — SIGNIFICANT CHANGE UP
TIBC SERPL-MCNC: 151 UG/DL — LOW (ref 220–430)
TRANSFERRIN SERPL-MCNC: 144 MG/DL — LOW (ref 200–360)
UIBC SERPL-MCNC: 130 UG/DL — SIGNIFICANT CHANGE UP (ref 110–370)
WBC # BLD: 5.16 K/UL — SIGNIFICANT CHANGE UP (ref 3.8–10.5)
WBC # FLD AUTO: 5.16 K/UL — SIGNIFICANT CHANGE UP (ref 3.8–10.5)

## 2022-07-15 PROCEDURE — 99233 SBSQ HOSP IP/OBS HIGH 50: CPT | Mod: GC

## 2022-07-15 PROCEDURE — 99222 1ST HOSP IP/OBS MODERATE 55: CPT

## 2022-07-15 RX ORDER — VANCOMYCIN HCL 1 G
1500 VIAL (EA) INTRAVENOUS ONCE
Refills: 0 | Status: COMPLETED | OUTPATIENT
Start: 2022-07-15 | End: 2022-07-15

## 2022-07-15 RX ORDER — NYSTATIN CREAM 100000 [USP'U]/G
1 CREAM TOPICAL
Refills: 0 | Status: DISCONTINUED | OUTPATIENT
Start: 2022-07-15 | End: 2022-07-22

## 2022-07-15 RX ORDER — CEFAZOLIN SODIUM 1 G
2000 VIAL (EA) INJECTION EVERY 8 HOURS
Refills: 0 | Status: DISCONTINUED | OUTPATIENT
Start: 2022-07-15 | End: 2022-07-22

## 2022-07-15 RX ORDER — CYCLOBENZAPRINE HYDROCHLORIDE 10 MG/1
5 TABLET, FILM COATED ORAL THREE TIMES A DAY
Refills: 0 | Status: DISCONTINUED | OUTPATIENT
Start: 2022-07-15 | End: 2022-07-18

## 2022-07-15 RX ORDER — FUROSEMIDE 40 MG
1 TABLET ORAL
Qty: 13 | Refills: 3
Start: 2022-07-15 | End: 2022-11-11

## 2022-07-15 RX ORDER — HYDROMORPHONE HYDROCHLORIDE 2 MG/ML
0.5 INJECTION INTRAMUSCULAR; INTRAVENOUS; SUBCUTANEOUS ONCE
Refills: 0 | Status: DISCONTINUED | OUTPATIENT
Start: 2022-07-15 | End: 2022-07-16

## 2022-07-15 RX ORDER — OXYCODONE HYDROCHLORIDE 5 MG/1
5 TABLET ORAL EVERY 6 HOURS
Refills: 0 | Status: DISCONTINUED | OUTPATIENT
Start: 2022-07-15 | End: 2022-07-18

## 2022-07-15 RX ORDER — LIDOCAINE 4 G/100G
1 CREAM TOPICAL DAILY
Refills: 0 | Status: DISCONTINUED | OUTPATIENT
Start: 2022-07-15 | End: 2022-07-22

## 2022-07-15 RX ORDER — CEFAZOLIN SODIUM 1 G
2000 VIAL (EA) INJECTION ONCE
Refills: 0 | Status: COMPLETED | OUTPATIENT
Start: 2022-07-15 | End: 2022-07-15

## 2022-07-15 RX ORDER — HYDROMORPHONE HYDROCHLORIDE 2 MG/ML
0.5 INJECTION INTRAMUSCULAR; INTRAVENOUS; SUBCUTANEOUS ONCE
Refills: 0 | Status: DISCONTINUED | OUTPATIENT
Start: 2022-07-15 | End: 2022-07-18

## 2022-07-15 RX ORDER — CEFAZOLIN SODIUM 1 G
VIAL (EA) INJECTION
Refills: 0 | Status: DISCONTINUED | OUTPATIENT
Start: 2022-07-15 | End: 2022-07-22

## 2022-07-15 RX ADMIN — Medication 12.5 MILLIGRAM(S): at 15:14

## 2022-07-15 RX ADMIN — Medication 650 MILLIGRAM(S): at 12:20

## 2022-07-15 RX ADMIN — Medication 650 MILLIGRAM(S): at 01:00

## 2022-07-15 RX ADMIN — Medication 650 MILLIGRAM(S): at 13:00

## 2022-07-15 RX ADMIN — ENOXAPARIN SODIUM 40 MILLIGRAM(S): 100 INJECTION SUBCUTANEOUS at 07:08

## 2022-07-15 RX ADMIN — Medication 650 MILLIGRAM(S): at 07:08

## 2022-07-15 RX ADMIN — Medication 300 MILLIGRAM(S): at 11:02

## 2022-07-15 RX ADMIN — CYCLOBENZAPRINE HYDROCHLORIDE 5 MILLIGRAM(S): 10 TABLET, FILM COATED ORAL at 15:14

## 2022-07-15 RX ADMIN — LIDOCAINE 1 PATCH: 4 CREAM TOPICAL at 23:35

## 2022-07-15 RX ADMIN — OXYCODONE HYDROCHLORIDE 5 MILLIGRAM(S): 5 TABLET ORAL at 22:40

## 2022-07-15 RX ADMIN — OXYCODONE HYDROCHLORIDE 5 MILLIGRAM(S): 5 TABLET ORAL at 21:40

## 2022-07-15 RX ADMIN — OXYCODONE HYDROCHLORIDE 5 MILLIGRAM(S): 5 TABLET ORAL at 15:14

## 2022-07-15 RX ADMIN — Medication 650 MILLIGRAM(S): at 07:46

## 2022-07-15 RX ADMIN — Medication 100 MILLIGRAM(S): at 17:28

## 2022-07-15 RX ADMIN — Medication 137 MICROGRAM(S): at 07:08

## 2022-07-15 RX ADMIN — OXYCODONE HYDROCHLORIDE 5 MILLIGRAM(S): 5 TABLET ORAL at 11:30

## 2022-07-15 RX ADMIN — LIDOCAINE 1 PATCH: 4 CREAM TOPICAL at 12:20

## 2022-07-15 RX ADMIN — OXYCODONE HYDROCHLORIDE 5 MILLIGRAM(S): 5 TABLET ORAL at 10:37

## 2022-07-15 RX ADMIN — Medication 1 TABLET(S): at 15:13

## 2022-07-15 RX ADMIN — Medication 650 MILLIGRAM(S): at 18:08

## 2022-07-15 RX ADMIN — Medication 650 MILLIGRAM(S): at 18:56

## 2022-07-15 RX ADMIN — NYSTATIN CREAM 1 APPLICATION(S): 100000 CREAM TOPICAL at 17:29

## 2022-07-15 RX ADMIN — CYCLOBENZAPRINE HYDROCHLORIDE 5 MILLIGRAM(S): 10 TABLET, FILM COATED ORAL at 21:40

## 2022-07-15 RX ADMIN — ENOXAPARIN SODIUM 40 MILLIGRAM(S): 100 INJECTION SUBCUTANEOUS at 17:29

## 2022-07-15 RX ADMIN — Medication 100 MILLIGRAM(S): at 23:01

## 2022-07-15 RX ADMIN — Medication 650 MILLIGRAM(S): at 01:35

## 2022-07-15 RX ADMIN — OXYCODONE HYDROCHLORIDE 5 MILLIGRAM(S): 5 TABLET ORAL at 16:00

## 2022-07-15 NOTE — PROGRESS NOTE ADULT - PROBLEM SELECTOR PLAN 7
platelets: 140   most likely reactive   - continue to trend with CBC Patient on Lasix 20 mg PO M/W/F and losartan 25 mg qd; Toprol 12.5 qd at home  - recent admission to [facility] due to HF exacerbation   - c/w home meds toprol  - hold lasix and losartan in the setting of HERNESTO   - DASH/TLC diet Patient on Lasix 20 mg PO M/W/F and losartan 25 mg qd; Toprol 12.5 qd at home  - recent admission to [facility] due to HF exacerbation   - c/w home meds toprol  - hold lasix and losartan in the setting of HERNESTO   - DASH/TLC diet  - f/u outpatient HF team Hgb: 10.0 with RDW 20.5, ferritin 793  Consistent with anemia of chronic disease with unknown source  - monitor CBC  - Iron studies done Hgb: 10.0 with RDW 20.5, ferritin 793  Consistent with anemia of chronic disease with unknown source  - monitor CBC daily

## 2022-07-15 NOTE — DIETITIAN INITIAL EVALUATION ADULT - NSFNSNUTRCHEWSWALLOWFT_GEN_A_CORE
none.RD reinforced the need for patient to keep head elevated when eating to prevent aspiration risk.

## 2022-07-15 NOTE — PROGRESS NOTE ADULT - PROBLEM SELECTOR PLAN 6
Hgb: 10.4 with RDW 20.7   most likely 2/2 to iron deficiency   - monitor CBC  - f/up iron studies Hgb: 10.0 with RDW 20.5, ferritin 793  Consistent with anemia of chronic disease with unknown source  - monitor CBC  - Iron studies done Erythematous scaly patches present diffusely in skin folds  - Nystatin powder will be applied Erythematous scaly patches present diffusely in skin folds  - Treated w/ nystatin powder

## 2022-07-15 NOTE — PROGRESS NOTE ADULT - ASSESSMENT
85 yo female with PMH HFpEF (last ECHO 7/9) who presented to ED w atraumatic back pain, recently admitted for acute hypoxic resp failure 2/2 to CHF exacerbation, admitted for pre renal HERNESTO, and severe back pain likely 2/2 muscle spasm and inability to ambulate.    87 yo female with PMH HFpEF (diagnosed in last admission 7/9-12, last ECHO 7/9), sacral decubitus ulcer, who presented to ED w atraumatic back pain 1 day after hospital discharge for acute hypoxic resp failure 2/2 to CHF exacerbation, was admitted for pre renal HERNESTO, and severe back pain found to have +Bcx bacteremia.

## 2022-07-15 NOTE — PROGRESS NOTE ADULT - PROBLEM SELECTOR PLAN 12
Pt. reports Hypothyroidism s/p radioactive iodine therapy decades ago  - Well controlled on 37 mg synthroid once daily Patient has BMI of 48.3  - Consult nutrition

## 2022-07-15 NOTE — DIETITIAN INITIAL EVALUATION ADULT - PERTINENT MEDS FT
MEDICATIONS  (STANDING):  acetaminophen     Tablet .. 650 milliGRAM(s) Oral every 6 hours  cyclobenzaprine 5 milliGRAM(s) Oral three times a day  enoxaparin Injectable 40 milliGRAM(s) SubCutaneous every 12 hours  levothyroxine 137 MICROGram(s) Oral every 24 hours  lidocaine   4% Patch 1 Patch Transdermal daily  metoprolol succinate ER 12.5 milliGRAM(s) Oral every 24 hours  oxyCODONE    IR 5 milliGRAM(s) Oral every 6 hours    MEDICATIONS  (PRN):

## 2022-07-15 NOTE — PROGRESS NOTE ADULT - PROBLEM SELECTOR PLAN 8
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 Pt reports not having follow up in years.   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 Pt reports not having follow up in years.   -f/up cancer biomarkers: CEA and   -f/up outpatient for abdominal MRI (pancreatic protocol, without and with contrast) Patient on Lasix 20 mg PO M/W/F and losartan 25 mg qd; Toprol 12.5 qd at home  - recent admission to [facility] due to HF exacerbation   - c/w home meds toprol  - hold lasix and losartan in the setting of HERNESTO   - DASH/TLC diet  - f/u outpatient HF team

## 2022-07-15 NOTE — CONSULT NOTE ADULT - SUBJECTIVE AND OBJECTIVE BOX
HPI:  Patient is an 86 F with PMH HFpEF (last ECHO ) , HTN, RA, hypothyroidism, and sacral decubitus ulcer who presented to ED w atraumatic back pain. Pt reports pain occurred after quick movement following a nap yesterday. Patient reports feeling a sharp stabbing pain over b/l lower back. Pain does not radiate. Pain is worse with movement, however feels a dully, aching pain at rest. Patient has never felt a pain like this before. Patient denies any weight changes, fever/chills, nausea, vomiting, diaphoresis, headache, sore throat, chest pain, palpitations, LE edema, dyspnea, cough, wheezing, changes in appetite, constipation, diarrhea, abdominal pain, burning on urination, urinary frequency, dizziness, or fainting/LOC.   Patient reports mother with breast cancer and father with metastatic colon cancer.     Of note, pt was admitted to [facility] from - for acute hypoxic respiratory failure 2/2 CHF exacerbation     (2022 12:14)      PAST MEDICAL & SURGICAL HISTORY:  Fluid retention      Hypothyroidism      H/O CHF      S/P appendectomy      S/P cholecystectomy      S/P hysterectomy            REVIEW OF SYSTEMS:    General:	 no weakness; no fevers, no chills  Skin/Breast: no rash  Respiratory and Thorax: no SOB, no cough  Cardiovascular:	No chest pain  Gastrointestinal:	 no nausea, vomiting , diarrhea  Genitourinary:	no dysuria, no difficulty urinating, no hematuria  Musculoskeletal:	no weakness, no joint swelling/pain  Neurological:	no focal weakness/numbness  Endocrine:	no polyuria, no polydipsia      ANTIBIOTICS:  MEDICATIONS  (STANDING):  acetaminophen     Tablet .. 650 milliGRAM(s) Oral every 6 hours  ceFAZolin   IVPB      ceFAZolin   IVPB 2000 milliGRAM(s) IV Intermittent once  ceFAZolin   IVPB 2000 milliGRAM(s) IV Intermittent every 8 hours  cyclobenzaprine 5 milliGRAM(s) Oral three times a day  enoxaparin Injectable 40 milliGRAM(s) SubCutaneous every 12 hours  HYDROmorphone  Injectable 0.5 milliGRAM(s) IV Push once  HYDROmorphone  Injectable 0.5 milliGRAM(s) IV Push once  levothyroxine 137 MICROGram(s) Oral every 24 hours  lidocaine   4% Patch 1 Patch Transdermal daily  metoprolol succinate ER 12.5 milliGRAM(s) Oral every 24 hours  multivitamin 1 Tablet(s) Oral daily  nystatin Powder 1 Application(s) Topical two times a day  oxyCODONE    IR 5 milliGRAM(s) Oral every 6 hours    MEDICATIONS  (PRN):      Allergies    Levaquin (Hives)    Intolerances        SOCIAL HISTORY:    FAMILY HISTORY:      Vital Signs Last 24 Hrs  T(C): 37.4 (15 Jul 2022 13:39), Max: 37.4 (2022 21:37)  T(F): 99.3 (15 Jul 2022 13:39), Max: 99.4 (2022 21:37)  HR: 70 (15 Jul 2022 13:39) (70 - 93)  BP: 122/72 (15 Jul 2022 13:39) (104/62 - 122/72)  BP(mean): --  RR: 18 (15 Jul 2022 13:39) (16 - 18)  SpO2: 97% (15 Jul 2022 13:39) (93% - 97%)    Parameters below as of 15 Jul 2022 13:39  Patient On (Oxygen Delivery Method): nasal cannula  O2 Flow (L/min): 2      PHYSICAL EXAM:  Constitutional: NAD  Eyes: MIKE, EOMI  Ear/Nose/Throat: no oral lesion, no sinus tenderness on percussion	  Neck: no JVD, no lymphadenopathy, supple  Respiratory: CTA fly  Cardiovascular: S1S2 RRR, no murmurs  Gastrointestinal: soft, (+) BS, no HSM  Extremities:no e/e/c  Vascular: DP Pulse: right normal; left normal            LABS:                        10.0   5.16  )-----------( 131      ( 15 Jul 2022 08:31 )             30.3     07-15    139  |  100  |  49<H>  ----------------------------<  104<H>  3.9   |  29  |  1.42<H>    Ca    8.8      15 Jul 2022 08:31  Phos  3.9     07-15  Mg     2.4     07-15    TPro  7.4  /  Alb  3.6  /  TBili  0.8  /  DBili  x   /  AST  37  /  ALT  34  /  AlkPhos  55  07-14    PT/INR - ( 2022 03:24 )   PT: 13.0 sec;   INR: 1.11          PTT - ( 2022 03:24 )  PTT:32.8 sec  Urinalysis Basic - ( 2022 03:24 )    Color: Yellow / Appearance: Clear / S.020 / pH: x  Gluc: x / Ketone: NEGATIVE  / Bili: NEGATIVE / Urobili: 0.2 E.U./dL   Blood: x / Protein: NEGATIVE mg/dL / Nitrite: NEGATIVE   Leuk Esterase: NEGATIVE / RBC: x / WBC x   Sq Epi: x / Non Sq Epi: x / Bacteria: x        MICROBIOLOGY: Culture - Blood (22 @ 05:27)    -  Staphylococcus lugdunensis: Detec    Gram Stain:   Growth in anaerobic bottle: Gram positive cocci in pairs    Specimen Source: .Blood Blood    Organism: Blood Culture PCR    Culture Results:   Growth in anaerobic bottle: Gram positive cocci in pairs  ***Blood Panel PCR results on this specimen are available  approximately 3 hours after the Gram stain result.***  Gram stain, PCR, and/or culture results may not always  correspond due to difference in methodologies.  ************************************************************  This PCR assay was performed by multiplex PCR. This  Assay tests for 66 bacterial and resistance gene targets.  Please refer to the [facility] test directory  at [email or web address] for details.    Organism Identification: Blood Culture PCR    Method Type: PCR      RADIOLOGY & ADDITIONAL STUDIES: reviewed

## 2022-07-15 NOTE — PROGRESS NOTE ADULT - SUBJECTIVE AND OBJECTIVE BOX
***INCOMPLETE***    INTERVAL HPI/OVERNIGHT EVENTS:  Patient was seen and examined at bedside. As per nurse and patient, no o/n events, patient resting comfortably. No complaints at this time. Patient denies: fever, chills, dizziness, weakness, HA, Changes in vision, CP, palpitations, SOB, cough, N/V/D/C, dysuria, changes in bowel movements, LE edema. ROS otherwise negative.    VITAL SIGNS:  T(F): 98 (07-15-22 @ 06:03)  HR: 71 (07-15-22 @ 06:03)  BP: 104/62 (07-15-22 @ 06:03)  RR: 18 (07-15-22 @ 06:03)  SpO2: 97% (07-15-22 @ 06:03)  Wt(kg): --    PHYSICAL EXAM:    Constitutional: WDWN, NAD  HEENT: PERRL, EOMI, sclera non-icteric, neck supple, trachea midline, no masses, no JVD, MMM, good dentition  Respiratory: CTA b/l, good air entry b/l, no wheezing, no rhonchi, no rales, without accessory muscle use and no intercostal retractions  Cardiovascular: RRR, normal S1S2, no M/R/G  Gastrointestinal: soft, NTND, no masses palpable, BS normal  Extremities: Warm, well perfused, pulses equal bilateral upper and lower extremities, no edema, no clubbing  Neurological: AAOx3, CN Grossly intact  Skin: Normal temperature, warm, dry    MEDICATIONS  (STANDING):  acetaminophen     Tablet .. 650 milliGRAM(s) Oral every 6 hours  enoxaparin Injectable 40 milliGRAM(s) SubCutaneous every 12 hours  levothyroxine 137 MICROGram(s) Oral every 24 hours  metoprolol succinate ER 12.5 milliGRAM(s) Oral every 24 hours    MEDICATIONS  (PRN):  cyclobenzaprine 5 milliGRAM(s) Oral three times a day PRN Muscle Spasm  lidocaine   4% Patch 1 Patch Transdermal daily PRN for pain  oxyCODONE    IR 5 milliGRAM(s) Oral every 6 hours PRN Severe Pain (7 - 10)    Allergies    Levaquin (Hives)    Intolerances    LABS:                        10.4   6.43  )-----------( 140      ( 2022 03:22 )             31.5     07-14    140  |  102  |  60<H>  ----------------------------<  126<H>  4.3   |  29  |  2.03<H>    Ca    9.0      2022 03:22  Mg     2.5         TPro  7.4  /  Alb  3.6  /  TBili  0.8  /  DBili  x   /  AST  37  /  ALT  34  /  AlkPhos  55      PT/INR - ( 2022 03:24 )   PT: 13.0 sec;   INR: 1.11          PTT - ( 2022 03:24 )  PTT:32.8 sec  Urinalysis Basic - ( 2022 03:24 )    Color: Yellow / Appearance: Clear / S.020 / pH: x  Gluc: x / Ketone: NEGATIVE  / Bili: NEGATIVE / Urobili: 0.2 E.U./dL   Blood: x / Protein: NEGATIVE mg/dL / Nitrite: NEGATIVE   Leuk Esterase: NEGATIVE / RBC: x / WBC x   Sq Epi: x / Non Sq Epi: x / Bacteria: x          RADIOLOGY & ADDITIONAL TESTS:  Reviewed ***INCOMPLETE***    INTERVAL HPI/OVERNIGHT EVENTS:  Patient was seen and examined at bedside. As per nurse and patient, patient is having severe sharp stabbing lower back pain upon any movement that radiates to the epigastric region. The pain improves at rest. Patient also has complains of chronic right knee pain exacerbated by walking and weather changes. Patient denies: fever, chills, dizziness, CP, palpitations, SOB, N/V/D, dysuria, changes in bowel movements, hemoptysis.     VITAL SIGNS:  T(F): 98 (07-15-22 @ 06:03)  HR: 71 (07-15-22 @ 06:03)  BP: 104/62 (07-15-22 @ 06:03)  RR: 18 (07-15-22 @ 06:03)  SpO2: 97% (07-15-22 @ 06:03)  Wt(kg): --    PHYSICAL EXAM:    Constitutional: WDWN, NAD  HEENT: PERRL, EOMI, sclera non-icteric, neck supple, trachea midline, no masses, no JVD, MMM, good dentition  Respiratory: CTA b/l, good air entry b/l, no wheezing, no rhonchi, no rales, without accessory muscle use and no intercostal retractions  Cardiovascular: RRR, normal S1S2, no M/R/G  Gastrointestinal: soft, NTND, no masses palpable, BS normal  Extremities: Warm, well perfused, pulses equal bilateral upper and lower extremities, no edema, no clubbing  Neurological: AAOx3, CN Grossly intact  Skin: Normal temperature, warm, dry    MEDICATIONS  (STANDING):  acetaminophen     Tablet .. 650 milliGRAM(s) Oral every 6 hours  enoxaparin Injectable 40 milliGRAM(s) SubCutaneous every 12 hours  levothyroxine 137 MICROGram(s) Oral every 24 hours  metoprolol succinate ER 12.5 milliGRAM(s) Oral every 24 hours    MEDICATIONS  (PRN):  cyclobenzaprine 5 milliGRAM(s) Oral three times a day PRN Muscle Spasm  lidocaine   4% Patch 1 Patch Transdermal daily PRN for pain  oxyCODONE    IR 5 milliGRAM(s) Oral every 6 hours PRN Severe Pain (7 - 10)    Allergies    Levaquin (Hives)    Intolerances    LABS:                        10.4   6.43  )-----------( 140      ( 2022 03:22 )             31.5     0714    140  |  102  |  60<H>  ----------------------------<  126<H>  4.3   |  29  |  2.03<H>    Ca    9.0      2022 03:22  Mg     2.5         TPro  7.4  /  Alb  3.6  /  TBili  0.8  /  DBili  x   /  AST  37  /  ALT  34  /  AlkPhos  55  07-14    PT/INR - ( 2022 03:24 )   PT: 13.0 sec;   INR: 1.11          PTT - ( 2022 03:24 )  PTT:32.8 sec  Urinalysis Basic - ( 2022 03:24 )    Color: Yellow / Appearance: Clear / S.020 / pH: x  Gluc: x / Ketone: NEGATIVE  / Bili: NEGATIVE / Urobili: 0.2 E.U./dL   Blood: x / Protein: NEGATIVE mg/dL / Nitrite: NEGATIVE   Leuk Esterase: NEGATIVE / RBC: x / WBC x   Sq Epi: x / Non Sq Epi: x / Bacteria: x          RADIOLOGY & ADDITIONAL TESTS:  Reviewed ***INCOMPLETE***    INTERVAL HPI/OVERNIGHT EVENTS:  Patient was seen and examined at bedside. As per nurse and patient, patient is having severe sharp stabbing lower back pain upon any movement that radiates to the epigastric region. The pain improves at rest. Patient also has complaints of chronic right knee pain exacerbated by walking and weather changes. Patient denies: fever, chills, dizziness, CP, palpitations, SOB, N/V/D, dysuria, changes in bowel movements, hemoptysis.     VITAL SIGNS:  T(F): 98 (07-15-22 @ 06:03)  HR: 71 (07-15-22 @ 06:03)  BP: 104/62 (07-15-22 @ 06:03)  RR: 18 (07-15-22 @ 06:03)  SpO2: 97% (07-15-22 @ 06:03)  Wt(kg): --    PHYSICAL EXAM:    Constitutional: Patient is awake and alert; Morbidly obese; Appears to be in extreme pain with any movement  HEENT: EOMI, CN II-XII, thyroid enlargement hard to appreciate due to body habitus  sclera non-icteric, neck supple, trachea midline, no masses, no JVD, MMM, good dentition  Respiratory: CTA b/l, good air entry b/l, no wheezing, no rhonchi, no rales, without accessory muscle use and no intercostal retractions  Cardiovascular: RRR, normal S1S2, no M/R/G  Gastrointestinal: soft, NTND, no masses palpable, BS normal  Extremities: Warm, well perfused, pulses equal bilateral upper and lower extremities, no edema, no clubbing  Neurological: AAOx3, CN Grossly intact  Skin: Normal temperature, warm, dry    MEDICATIONS  (STANDING):  acetaminophen     Tablet .. 650 milliGRAM(s) Oral every 6 hours  enoxaparin Injectable 40 milliGRAM(s) SubCutaneous every 12 hours  levothyroxine 137 MICROGram(s) Oral every 24 hours  metoprolol succinate ER 12.5 milliGRAM(s) Oral every 24 hours    MEDICATIONS  (PRN):  cyclobenzaprine 5 milliGRAM(s) Oral three times a day PRN Muscle Spasm  lidocaine   4% Patch 1 Patch Transdermal daily PRN for pain  oxyCODONE    IR 5 milliGRAM(s) Oral every 6 hours PRN Severe Pain (7 - 10)    Allergies    Levaquin (Hives)    Intolerances    LABS:                        10.4   6.43  )-----------( 140      ( 2022 03:22 )             31.5     07-14    140  |  102  |  60<H>  ----------------------------<  126<H>  4.3   |  29  |  2.03<H>    Ca    9.0      2022 03:22  Mg     2.5         TPro  7.4  /  Alb  3.6  /  TBili  0.8  /  DBili  x   /  AST  37  /  ALT  34  /  AlkPhos  55  07-14    PT/INR - ( 2022 03:24 )   PT: 13.0 sec;   INR: 1.11          PTT - ( 2022 03:24 )  PTT:32.8 sec  Urinalysis Basic - ( 2022 03:24 )    Color: Yellow / Appearance: Clear / S.020 / pH: x  Gluc: x / Ketone: NEGATIVE  / Bili: NEGATIVE / Urobili: 0.2 E.U./dL   Blood: x / Protein: NEGATIVE mg/dL / Nitrite: NEGATIVE   Leuk Esterase: NEGATIVE / RBC: x / WBC x   Sq Epi: x / Non Sq Epi: x / Bacteria: x          RADIOLOGY & ADDITIONAL TESTS:  Reviewed ***INCOMPLETE***    INTERVAL HPI/OVERNIGHT EVENTS:  Patient was seen and examined at bedside. As per nurse and patient, patient is having severe sharp stabbing lower back pain upon any movement that radiates to the epigastric region. The pain improves at rest. Patient also has complaints of chronic right knee pain exacerbated by walking and weather changes. Patient denies: fever, chills, dizziness, CP, palpitations, SOB, N/V/D, dysuria, changes in bowel movements, hemoptysis.     VITAL SIGNS:  T(F): 98 (07-15-22 @ 06:03)  HR: 71 (07-15-22 @ 06:03)  BP: 104/62 (07-15-22 @ 06:03)  RR: 18 (07-15-22 @ 06:03)  SpO2: 97% (07-15-22 @ 06:03)  Wt(kg): --    PHYSICAL EXAM:    Constitutional: Patient is awake and alert; Morbidly obese; Appears to be in extreme pain with any movement  HEENT: EOMI, CN II-XII, thyroid enlargement hard to appreciate due to body habitus, sclera non-icteric  Respiratory: CTA b/l, no wheezing, no rhonchi, no rales  Cardiovascular: normal S1S2, +3/6 systolic murmur loudest at the right upper sternal border that radiates to the carotids  Gastrointestinal: soft, NTND, no masses palpable, hyperactive BS  Extremities: Warm, well perfused, pulses equal bilateral upper and lower extremities, no edema, no clubbing  Neurological: AAOx3, CN Grossly intact  Skin: Normal temperature, warm, dry    MEDICATIONS  (STANDING):  acetaminophen     Tablet .. 650 milliGRAM(s) Oral every 6 hours  enoxaparin Injectable 40 milliGRAM(s) SubCutaneous every 12 hours  levothyroxine 137 MICROGram(s) Oral every 24 hours  metoprolol succinate ER 12.5 milliGRAM(s) Oral every 24 hours    MEDICATIONS  (PRN):  cyclobenzaprine 5 milliGRAM(s) Oral three times a day PRN Muscle Spasm  lidocaine   4% Patch 1 Patch Transdermal daily PRN for pain  oxyCODONE    IR 5 milliGRAM(s) Oral every 6 hours PRN Severe Pain (7 - 10)    Allergies    Levaquin (Hives)    Intolerances    LABS:                        10.4   6.43  )-----------( 140      ( 2022 03:22 )             31.5     07-14    140  |  102  |  60<H>  ----------------------------<  126<H>  4.3   |  29  |  2.03<H>    Ca    9.0      2022 03:22  Mg     2.5     07-14    TPro  7.4  /  Alb  3.6  /  TBili  0.8  /  DBili  x   /  AST  37  /  ALT  34  /  AlkPhos  55  07-14    PT/INR - ( 2022 03:24 )   PT: 13.0 sec;   INR: 1.11          PTT - ( 2022 03:24 )  PTT:32.8 sec  Urinalysis Basic - ( 2022 03:24 )    Color: Yellow / Appearance: Clear / S.020 / pH: x  Gluc: x / Ketone: NEGATIVE  / Bili: NEGATIVE / Urobili: 0.2 E.U./dL   Blood: x / Protein: NEGATIVE mg/dL / Nitrite: NEGATIVE   Leuk Esterase: NEGATIVE / RBC: x / WBC x   Sq Epi: x / Non Sq Epi: x / Bacteria: x          RADIOLOGY & ADDITIONAL TESTS:  Reviewed ***INCOMPLETE***    INTERVAL HPI/OVERNIGHT EVENTS:  Patient was seen and examined at bedside. As per nurse and patient, patient is having severe sharp stabbing lower back pain upon any movement that radiates to the epigastric region. The pain improves at rest. Patient also has complaints of chronic right knee pain exacerbated by walking and weather changes. Patient denies: fever, chills, dizziness, CP, palpitations, SOB, N/V/D, dysuria, changes in bowel movements, hemoptysis.     VITAL SIGNS:  T(F): 98 (07-15-22 @ 06:03)  HR: 71 (07-15-22 @ 06:03)  BP: 104/62 (07-15-22 @ 06:03)  RR: 18 (07-15-22 @ 06:03)  SpO2: 97% (07-15-22 @ 06:03)  Wt(kg): --    PHYSICAL EXAM:    Constitutional: Patient is awake and alert; Morbidly obese; Appears to be in extreme pain with any movement  HEENT: EOMI, CN II-XII, thyroid enlargement hard to appreciate due to body habitus, sclera non-icteric  Respiratory: CTA b/l, no wheezing, no rhonchi, no rales  Cardiovascular: normal S1S2; +3/6 systolic murmur loudest at the right upper sternal border that radiates to the carotids  Gastrointestinal: soft, NTND, no masses palpable; hyperactive BS; erythematous scaly patches along abdominal folds concerning for intertrigo; bruising noted bilaterally on lower abdominal quadrants  Extremities: Warm, well perfused; radial and posterior tibial pulses present bilaterally; no edema; 3 cm stage 2 ulcer on KHRIS  Patient was in extreme pain with any movement so not able to visualize back or sacral ulcer- Patient will be reexamined after administration of pain medications.     MEDICATIONS  (STANDING):  acetaminophen     Tablet .. 650 milliGRAM(s) Oral every 6 hours  enoxaparin Injectable 40 milliGRAM(s) SubCutaneous every 12 hours  levothyroxine 137 MICROGram(s) Oral every 24 hours  metoprolol succinate ER 12.5 milliGRAM(s) Oral every 24 hours    MEDICATIONS  (PRN):  cyclobenzaprine 5 milliGRAM(s) Oral three times a day PRN Muscle Spasm  lidocaine   4% Patch 1 Patch Transdermal daily PRN for pain  oxyCODONE    IR 5 milliGRAM(s) Oral every 6 hours PRN Severe Pain (7 - 10)    Allergies    Levaquin (Hives)    Intolerances    LABS:                        10.4   6.43  )-----------( 140      ( 2022 03:22 )             31.5         140  |  102  |  60<H>  ----------------------------<  126<H>  4.3   |  29  |  2.03<H>    Ca    9.0      2022 03:22  Mg     2.5         TPro  7.4  /  Alb  3.6  /  TBili  0.8  /  DBili  x   /  AST  37  /  ALT  34  /  AlkPhos  55  14    PT/INR - ( 2022 03:24 )   PT: 13.0 sec;   INR: 1.11          PTT - ( 2022 03:24 )  PTT:32.8 sec  Urinalysis Basic - ( 2022 03:24 )    Color: Yellow / Appearance: Clear / S.020 / pH: x  Gluc: x / Ketone: NEGATIVE  / Bili: NEGATIVE / Urobili: 0.2 E.U./dL   Blood: x / Protein: NEGATIVE mg/dL / Nitrite: NEGATIVE   Leuk Esterase: NEGATIVE / RBC: x / WBC x   Sq Epi: x / Non Sq Epi: x / Bacteria: x          RADIOLOGY & ADDITIONAL TESTS:  Reviewed ***INCOMPLETE***    INTERVAL HPI/OVERNIGHT EVENTS:  Patient was seen and examined at bedside. As per nurse and patient, patient is having severe sharp stabbing lower back pain upon any movement that radiates to the epigastric region. The pain improves at rest. Patient also has complaints of chronic right knee pain exacerbated by walking and weather changes. Patient denies: fever, chills, dizziness, CP, palpitations, SOB, N/V/D, dysuria, changes in bowel movements, hemoptysis.     VITAL SIGNS:  T(F): 98 (07-15-22 @ 06:03)  HR: 71 (07-15-22 @ 06:03)  BP: 104/62 (07-15-22 @ 06:03)  RR: 18 (07-15-22 @ 06:03)  SpO2: 97% (07-15-22 @ 06:03)  Wt(kg): --    PHYSICAL EXAM:    Constitutional: Patient is awake and alert; Morbidly obese; Appears to be in extreme pain with any movement  HEENT: EOMI, CN II-XII, thyroid enlargement hard to appreciate due to body habitus, sclera non-icteric  Respiratory: CTA b/l, no wheezing, no rhonchi, no rales  Cardiovascular: normal S1S2; +3/6 systolic murmur loudest at the right upper sternal border that radiates to the carotids  Gastrointestinal: soft, NTND, no masses palpable; hyperactive BS; erythematous scaly patches along abdominal folds concerning for intertrigo; bruising noted bilaterally in lower abdominal quadrants  Extremities: Warm, well perfused; radial and posterior tibial pulses present bilaterally; no edema; 3 cm stage 2 ulcer on KHRIS, erythematous scaly patches concerning for intertrigo in popliteal fossa bilaterally; extreme pain upon palpation of lower extremities bilaterally   Patient was in extreme pain with any movement so not able to visualize back or sacral ulcer- Patient will be reexamined after administration of pain medications.     MEDICATIONS  (STANDING):  acetaminophen     Tablet .. 650 milliGRAM(s) Oral every 6 hours  enoxaparin Injectable 40 milliGRAM(s) SubCutaneous every 12 hours  levothyroxine 137 MICROGram(s) Oral every 24 hours  metoprolol succinate ER 12.5 milliGRAM(s) Oral every 24 hours    MEDICATIONS  (PRN):  cyclobenzaprine 5 milliGRAM(s) Oral three times a day PRN Muscle Spasm  lidocaine   4% Patch 1 Patch Transdermal daily PRN for pain  oxyCODONE    IR 5 milliGRAM(s) Oral every 6 hours PRN Severe Pain (7 - 10)    Allergies    Levaquin (Hives)    Intolerances    LABS:                        10.4   6.43  )-----------( 140      ( 2022 03:22 )             31.5     07-14    140  |  102  |  60<H>  ----------------------------<  126<H>  4.3   |  29  |  2.03<H>    Ca    9.0      2022 03:22  Mg     2.5     07-14    TPro  7.4  /  Alb  3.6  /  TBili  0.8  /  DBili  x   /  AST  37  /  ALT  34  /  AlkPhos  55  07-14    PT/INR - ( 2022 03:24 )   PT: 13.0 sec;   INR: 1.11          PTT - ( 2022 03:24 )  PTT:32.8 sec  Urinalysis Basic - ( 2022 03:24 )    Color: Yellow / Appearance: Clear / S.020 / pH: x  Gluc: x / Ketone: NEGATIVE  / Bili: NEGATIVE / Urobili: 0.2 E.U./dL   Blood: x / Protein: NEGATIVE mg/dL / Nitrite: NEGATIVE   Leuk Esterase: NEGATIVE / RBC: x / WBC x   Sq Epi: x / Non Sq Epi: x / Bacteria: x          RADIOLOGY & ADDITIONAL TESTS:  Reviewed INTERVAL HPI/OVERNIGHT EVENTS:  Patient was seen and examined at bedside. As per nurse and patient, patient is having severe sharp stabbing lower back pain upon any movement that radiates to the epigastric region. The pain improves at rest. Patient also has complaints of chronic right knee pain exacerbated by walking and weather changes. Patient denies: fever, chills, dizziness, CP, palpitations, SOB, N/V/D, dysuria, changes in bowel movements, hemoptysis.     VITAL SIGNS:  T(F): 98 (07-15-22 @ 06:03)  HR: 71 (07-15-22 @ 06:03)  BP: 104/62 (07-15-22 @ 06:03)  RR: 18 (07-15-22 @ 06:03)  SpO2: 97% (07-15-22 @ 06:03)  Wt(kg): --    PHYSICAL EXAM:    Constitutional: Patient is awake and alert; Morbidly obese; Appears to be in extreme pain with any movement  HEENT: EOMI, CN II-XII intact, sclera non-icteric  Respiratory: CTA b/l, no wheezing, no rhonchi, no rales  Cardiovascular: normal S1S2; +3/6 systolic murmur loudest at the right upper sternal border that radiates to the carotids  Gastrointestinal: soft, NTND, no masses palpable; erythematous scaly patches along abdominal folds concerning for intertrigo; bruising noted bilaterally in lower abdominal quadrants  Extremities: Warm, well perfused; radial and posterior tibial pulses present bilaterally; no edema; 3 cm stage 2 ulcer on LLE, erythematous scaly patches concerning for intertrigo in popliteal fossa bilaterally; extreme pain upon palpation of lower extremities bilaterally   Patient was in extreme pain with any movement so not able to visualize back or sacral ulcer    MEDICATIONS  (STANDING):  acetaminophen     Tablet .. 650 milliGRAM(s) Oral every 6 hours  enoxaparin Injectable 40 milliGRAM(s) SubCutaneous every 12 hours  levothyroxine 137 MICROGram(s) Oral every 24 hours  metoprolol succinate ER 12.5 milliGRAM(s) Oral every 24 hours    MEDICATIONS  (PRN):  cyclobenzaprine 5 milliGRAM(s) Oral three times a day PRN Muscle Spasm  lidocaine   4% Patch 1 Patch Transdermal daily PRN for pain  oxyCODONE    IR 5 milliGRAM(s) Oral every 6 hours PRN Severe Pain (7 - 10)    Allergies    Levaquin (Hives)    Intolerances    LABS:                        10.4   6.43  )-----------( 140      ( 2022 03:22 )             31.5     07-14    140  |  102  |  60<H>  ----------------------------<  126<H>  4.3   |  29  |  2.03<H>    Ca    9.0      2022 03:22  Mg     2.5     14    TPro  7.4  /  Alb  3.6  /  TBili  0.8  /  DBili  x   /  AST  37  /  ALT  34  /  AlkPhos  55  07-14    PT/INR - ( 2022 03:24 )   PT: 13.0 sec;   INR: 1.11       PTT - ( 2022 03:24 )  PTT:32.8 sec  Urinalysis Basic - ( 2022 03:24 )    Color: Yellow / Appearance: Clear / S.020 / pH: x  Gluc: x / Ketone: NEGATIVE  / Bili: NEGATIVE / Urobili: 0.2 E.U./dL   Blood: x / Protein: NEGATIVE mg/dL / Nitrite: NEGATIVE   Leuk Esterase: NEGATIVE / RBC: x / WBC x   Sq Epi: x / Non Sq Epi: x / Bacteria: x    RADIOLOGY & ADDITIONAL TESTS:  Reviewed

## 2022-07-15 NOTE — CONSULT NOTE ADULT - ASSESSMENT
per Internal Medicine    87 yo female with PMH HFpEF (last ECHO 7/9) who presented to ED w atraumatic back pain, recently admitted for acute hypoxic resp failure 2/2 to CHF exacerbation, admitted for pre renal HERNESTO, and severe back pain likely 2/2 muscle spasm and inability to ambulate.       Problem/Plan - 1:  ·  Problem: HERNESTO (acute kidney injury).   ·  Plan: Patient recently admitted for CHF. Last dose of lasix: 7/13. Since discharge, patient reports significantly decreased fluid intake. Patient states she has had no problems with urination, however significantly reduced given decreased intake. On PE, no LE edema or adventitial breath sounds. In ED: BUN: 60; Cr: 2.03  - most likely prerenal 2/2 to reduced PO intake and in the setting of lasix use   - unremarkable UA   - encouraged increased PO fluid intake   - nephrotoxic medications held (losartan, lasix)   - f/up urine lytes (urea and cr)   - c/w daily BMP.    Problem/Plan - 2:  ·  Problem: Bandemia without diagnosis of specific infection.   ·  Plan: no leukocytosis (WBC: 6.43) with bandemia 11%. Patient afebrile with no sx of active infection. Metamylocytes, platelet clumping   - CRP 21   - UA negative for UTI, RVP negative; no focal consolidations on CXR from a few days ago   - continue to trend CBC w diff  - fup blood cultures.    Problem/Plan - 3:  ·  Problem: Acute back pain.   ·  Plan: Pt reports acute back pain after quick movement   - on CT: complex cystic mass in the upper abdomen measuring up to 12.1 cm which appears to arise from the body of the pancreas   Advanced lumbar spondylosis, most severe at L4-L5 with grade 1 anterolisthesis contributing to spinal stenosis; more moderate spinal stenosis at L1-2 from posterior osteophyte ridge; see additional  detail above. Also, spinal stenosis at T9-10 with ventral calcified focus that may be calcified disc extrusion or possible meningioma  - most likely 2/2 to muscle spasm   - c/w lidocaine patch, cyclobenzaprine prn, Tylenol, oxycodone q6 prn  -f up cancer biomarkers: CEA and   -f/up outpatient for abdominal MRI (pancreatic protocol, without and with contrast)  - follow up neurgery recs (consulted for severe stenosis and possible meningioma).    Problem/Plan - 4:  ·  Problem: Pancreatic mass.   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 Pt reports not having follow up in years.   - -f up cancer biomarkers: CEA and   --f/up outpatient for abdominal MRI (pancreatic protocol, without and with contrast).    Problem/Plan - 5:  ·  Problem: Pressure ulcer.   ·  Plan: Pt with known pressure ulcer on sacrum   - stage 2   - wound care consulted  - f/up wound care recs.    Problem/Plan - 6:  ·  Problem: Anemia.   ·  Plan: Hgb: 10.4 with RDW 20.7   most likely 2/2 to iron deficiency   - monitor CBC  - f/up iron studies.    Problem/Plan - 7:  ·  Problem: Thrombocytopenia.   ·  Plan: platelets: 140   most likely reactive   - continue to trend with CBC.    Problem/Plan - 8:  ·  Problem: (HFpEF) heart failure with preserved ejection fraction.   ·  Plan: Patient on Lasix 20 mg PO M/W/F and losartan 25 mg qd; Toprol 12.5 qd at home  - recent admission to [facility] due to HF exacerbation   - c/w home meds toprol  - hold lasix and losartan in the setting of HERNESTO   - DASH/TLC diet.    Problem/Plan - 9:  ·  Problem: Pulmonary nodule.   ·  Plan: -6 mm solid L lower lobe nodule; no hx smoking   -fup CT chest at 6-12 mos outpatient.    Problem/Plan - 10:  ·  Problem: Prophylactic measure.   ·  Plan; Fluid: encourage PO fluid intake   E: replete to K>4, Mg>2, Phos>2.5  Nutrition/Diet : DASH/TLC   Ppx: lovenox     Code Status: full code    Dispo: F.

## 2022-07-15 NOTE — PROGRESS NOTE ADULT - PROBLEM SELECTOR PLAN 3
Pt reports acute back pain after quick movement   - on CT: complex cystic mass in the upper abdomen measuring up to 12.1 cm which appears to arise from the body of the pancreas   Advanced lumbar spondylosis, most severe at L4-L5 with grade 1 anterolisthesis contributing to spinal stenosis; more moderate spinal stenosis at L1-2 from posterior osteophyte ridge; see additional  detail above. Also, spinal stenosis at T9-10 with ventral calcified focus that may be calcified disc extrusion or possible meningioma  - most likely 2/2 to muscle spasm   - c/w lidocaine patch, cyclobenzaprine prn, Tylenol, oxycodone q6 prn  -f up cancer biomarkers: CEA and   -f/up outpatient for abdominal MRI (pancreatic protocol, without and with contrast)  - follow up neurgery recs (consulted for severe stenosis and possible meningioma) Patient recently admitted for CHF. Last dose of lasix: 7/13. Since discharge, patient reports significantly decreased fluid intake. Patient states she has had no problems with urination, however significantly reduced given decreased intake. On PE, no LE edema or adventitial breath sounds. In ED: BUN: 60; Cr: 2.03  - most likely prerenal 2/2 to reduced PO intake and in the setting of lasix use   - unremarkable UA   - encouraged increased PO fluid intake   - nephrotoxic medications held (losartan, lasix)   - f/up urine lytes (urea and cr)   - c/w daily BMP Patient recently admitted for CHF. Last dose of lasix: 7/13. Since discharge, patient reports significantly decreased fluid intake. Patient states she has had no problems with urination, however significantly reduced given decreased intake. On PE, no LE edema or adventitial breath sounds. In ED: BUN: 60; Cr: 2.03  - most likely prerenal 2/2 to reduced PO intake and in the setting of lasix use   - unremarkable UA   - encouraged increased PO fluid intake   - nephrotoxic medications held (losartan, lasix)   - BUN/Cr trending down (49/1.42)  - f/up urine lytes (urea and cr)   - c/w daily BMP Patient recently admitted for CHF. Last dose of lasix: 7/13. Since discharge, significantly decreased fluid intake. On PE, no LE edema or adventitial breath sounds. In ED: BUN: 60; Cr: 2.03  - most likely prerenal 2/2 to reduced PO intake and in the setting of lasix use   - unremarkable UA   - encouraged increased PO fluid intake   - nephrotoxic medications held (losartan, lasix)   - BUN/Cr trending down  - f/up urine lytes (urea and cr)

## 2022-07-15 NOTE — DIETITIAN INITIAL EVALUATION ADULT - OTHER INFO
Called and spoke with pt she states she has started new chemo and was given norco by Dr. Regina Tyson 1-2 tabs every 4 hours as needed.  Pt states she received IV pain meds during chemo visit yesterday and it helped, she took 1 norco in the afternoon and then 1 n Patient is an 87 y/o morbidly obese F with PMH HFpEF (last ECHO 7/9) , HTN, RA, hypothyroidism, and sacral decubitus ulcer who presented to ED w atraumatic back pain.  This am, patient lying flat in bed with noted complaining of pain.No N/V/D or BM since admission.Stage II sacral spine reported. Patient denied any weight changes.Stated UBW:247lbs,Present weight:247lbs, IBW:100+/-10%.BMI:48.3.Patient only able to take ice chips and fruit ice at present due to complaints of pain.Patient admitted she does not follow any restrictions in diet PTA.RD will follow up with diet education at a later time.Offer tolerable foods to patient and will need tray set-up assistance.

## 2022-07-15 NOTE — PROGRESS NOTE ADULT - PROBLEM SELECTOR PLAN 10
Fluid: encourage PO fluid intake   E: replete to K>4, Mg>2, Phos>2.5  Nutrition/Diet : DASH/TLC   Ppx: lovenox     Code Status: full code    Dispo: RMF -6 mm solid L lower lobe nodule; no hx smoking   -fup CT chest at 6-12 mos outpatient platelets: 140 --> 131 (7/15)  most likely reactive   - continue to trend with CBC

## 2022-07-15 NOTE — PROGRESS NOTE ADULT - PROBLEM SELECTOR PLAN 2
no leukocytosis (WBC: 6.43) with bandemia 11%. Patient afebrile with no sx of active infection. Metamylocytes, platelet clumping   - CRP 21   - UA negative for UTI, RVP negative; no focal consolidations on CXR from a few days ago   - continue to trend CBC w diff  - fup blood cultures Pt reports acute back pain after quick movement   - on CT: complex cystic mass in the upper abdomen measuring up to 12.1 cm which appears to arise from the body of the pancreas   Advanced lumbar spondylosis, most severe at L4-L5 with grade 1 anterolisthesis contributing to spinal stenosis; more moderate spinal stenosis at L1-2 from posterior osteophyte ridge; see additional  detail above. Also, spinal stenosis at T9-10 with ventral calcified focus that may be calcified disc extrusion or possible meningioma  - most likely 2/2 to muscle spasm   - c/w lidocaine patch, cyclobenzaprine prn, Tylenol, oxycodone q6 prn  -f up cancer biomarkers: CEA and   -f/up outpatient for abdominal MRI (pancreatic protocol, without and with contrast)  - follow up neurgery recs (consulted for severe stenosis and possible meningioma) Pt reports acute back pain after quick movement   - on CT: complex cystic mass in the upper abdomen measuring up to 12.1 cm which appears to arise from the body of the pancreas   Advanced lumbar spondylosis, most severe at L4-L5 with grade 1 anterolisthesis contributing to spinal stenosis; more moderate spinal stenosis at L1-2 from posterior osteophyte ridge; see additional  detail above. Also, spinal stenosis at T9-10 with ventral calcified focus that may be calcified disc extrusion or possible meningioma  - lidocaine patch daily, cyclobenzaprine 5 mg 3x daily, Tylenol 650 mg q6h, oxycodone 5 mg q6h  - f/u MRI of lumbar spine  -f up cancer biomarkers: CEA and   -f/up outpatient for abdominal MRI (pancreatic protocol, without and with contrast)  - follow up neurosurgery recs (consulted for severe stenosis and possible meningioma) Pt reports acute back pain after quick movement   - on CT: Advanced lumbar spondylosis, most severe at L4-L5 with grade 1 anterolisthesis contributing to spinal stenosis; more moderate spinal stenosis at L1-2 from posterior osteophyte ridge; see additional  detail above. Also, spinal stenosis at T9-10 with ventral calcified focus that may be calcified disc extrusion or possible meningioma  - lidocaine patch daily, cyclobenzaprine 5 mg 3x daily, Tylenol 650 mg q6h, oxycodone 5 mg q6h  - f/u MRI of lumbar spine  - follow up neurosurgery recs (consulted for severe stenosis and possible meningioma)

## 2022-07-15 NOTE — PROGRESS NOTE ADULT - PROBLEM SELECTOR PLAN 1
Patient recently admitted for CHF. Last dose of lasix: 7/13. Since discharge, patient reports significantly decreased fluid intake. Patient states she has had no problems with urination, however significantly reduced given decreased intake. On PE, no LE edema or adventitial breath sounds. In ED: BUN: 60; Cr: 2.03  - most likely prerenal 2/2 to reduced PO intake and in the setting of lasix use   - unremarkable UA   - encouraged increased PO fluid intake   - nephrotoxic medications held (losartan, lasix)   - f/up urine lytes (urea and cr)   - c/w daily BMP no leukocytosis (WBC: 6.43) with bandemia 11%. Patient afebrile, stable vitals with no sx of active infection. Metamylocytes, platelet clumping   - CRP 21   - UA negative for UTI, RVP negative; no focal consolidations on CXR from a few days ago   - continue to trend CBC w diff  - fup blood cultures no leukocytosis (WBC: 6.43) with bandemia 11%. Patient afebrile, stable vitals with no sx of active infection. Metamylocytes, platelet clumping  - Bcx positive for gram + cocci in clusters and in pairs  - CRP 21   - UA negative for UTI, RVP negative; no focal consolidations on CXR from a few days ago (past admission)  - continue to trend CBC w diff  - Repeat blood cultures no leukocytosis (WBC: 6.43) with bandemia 11%. Patient afebrile, stable vitals with no sx of active infection. Metamylocytes, platelet clumping  - Bcx positive for gram + cocci in clusters and in pairs  - Starting vancomycin with random vanc trough tomorrow morning per pharmacy recs regarding dosing with concomitant HERNESTO   - CRP 21   - UA negative for UTI, RVP negative; no focal consolidations on CXR from a few days ago (past admission)  - continue to trend CBC w diff  - Repeat blood cultures  - Get echocardiogram to rule out endocarditis in the setting of bacteremia no leukocytosis (WBC: 6.43) with bandemia 11%. Patient afebrile, stable vitals with no sx of active infection. Metamylocytes, platelet clumping  - Bcx positive for gram + cocci in clusters and in pairs  - Starting 1500 mg vancomycin with random vanc trough tomorrow morning per pharmacy recs regarding dosing with concomitant HERNESTO   - CRP 21   - UA negative for UTI, RVP negative; no focal consolidations on CXR from a few days ago (past admission)  - continue to trend CBC w diff  - Repeat blood cultures  - Get echocardiogram to rule out endocarditis in the setting of bacteremia no leukocytosis (WBC: 6.43) with bandemia 11%. Patient afebrile, stable vitals with no sx of active infection. Metamylocytes, platelet clumping  - Bcx positive for gram + cocci in clusters  - Starting 1500 mg vancomycin with random vanc trough tomorrow morning per pharmacy recs regarding dosing with concomitant HERNESTO   - CRP 21   - UA negative for UTI, RVP negative; no focal consolidations on CXR from a few days ago (past admission)  - continue to trend CBC w diff  - Get echocardiogram to rule out endocarditis in the setting of bacteremia no leukocytosis (WBC: 6.43) with bandemia 11%. Patient afebrile, stable vitals with no sx of active infection.   - Bcx positive for gram + cocci in clusters  - Starting 1500 mg vancomycin with random vanc trough tomorrow morning per pharmacy recs regarding dosing with concomitant HERNESTO   - CRP 21   - UA negative for UTI, RVP negative; no focal consolidations on CXR from a few days ago (past admission)  - continue to trend CBC w diff  - Get ECHO to rule out endocarditis in the setting of bacteremia

## 2022-07-15 NOTE — CONSULT NOTE ADULT - ASSESSMENT
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   - agree with MRI L spine  - surveillance bcx with AM labs  - f/u S. lugdunensis susceptibility  - repeat TTE  - d/c vancomycin; start cefazolin 2g IV q8h     d/w primary team    ID Team 2

## 2022-07-15 NOTE — PROGRESS NOTE ADULT - PROBLEM SELECTOR PLAN 5
Pt with known pressure ulcer on sacrum   - stage 2   - wound care consulted  - f/up wound care recs Erythematous scaly patches present diffusely in skin folds  - Nystatin powder will be applied SpO2 drops to 85% on RA  - SpO2 at 97% on 2L NC

## 2022-07-15 NOTE — CONSULT NOTE ADULT - SUBJECTIVE AND OBJECTIVE BOX
Patient is a 86y old  Female who presents with a chief complaint of HERNESTO and pain management (15 Jul 2022 06:47)        HPI:  Patient is an 86 F with PMH HFpEF (last ECHO ) , HTN, RA, hypothyroidism, and sacral decubitus ulcer who presented to ED w atraumatic back pain. Pt reports pain occurred after quick movement following a nap yesterday. Patient reports feeling a sharp stabbing pain over b/l lower back. Pain does not radiate. Pain is worse with movement, however feels a dully, aching pain at rest. Patient has never felt a pain like this before. Patient denies any weight changes, fever/chills, nausea, vomiting, diaphoresis, headache, sore throat, chest pain, palpitations, LE edema, dyspnea, cough, wheezing, changes in appetite, constipation, diarrhea, abdominal pain, burning on urination, urinary frequency, dizziness, or fainting/LOC.   Patient reports mother with breast cancer and father with metastatic colon cancer.     Of note, pt was admitted to [facility] from - for acute hypoxic respiratory failure 2/2 CHF exacerbation       (2022 12:14)      PAST MEDICAL & SURGICAL HISTORY:  Rheumatoid arthritis      Fluid retention      Hypothyroidism      H/O CHF      S/P appendectomy      S/P cholecystectomy      S/P hysterectomy          MEDICATIONS  (STANDING):  acetaminophen     Tablet .. 650 milliGRAM(s) Oral every 6 hours  enoxaparin Injectable 40 milliGRAM(s) SubCutaneous every 12 hours  levothyroxine 137 MICROGram(s) Oral every 24 hours  metoprolol succinate ER 12.5 milliGRAM(s) Oral every 24 hours    MEDICATIONS  (PRN):  cyclobenzaprine 5 milliGRAM(s) Oral three times a day PRN Muscle Spasm  lidocaine   4% Patch 1 Patch Transdermal daily PRN for pain  oxyCODONE    IR 5 milliGRAM(s) Oral every 6 hours PRN Severe Pain (7 - 10)            FAMILY HISTORY:    CBC Full  -  ( 15 Jul 2022 08:31 )  WBC Count : 5.16 K/uL  RBC Count : 3.10 M/uL  Hemoglobin : 10.0 g/dL  Hematocrit : 30.3 %  Platelet Count - Automated : 131 K/uL  Mean Cell Volume : 97.7 fl  Mean Cell Hemoglobin : 32.3 pg  Mean Cell Hemoglobin Concentration : 33.0 gm/dL  Auto Neutrophil # : 3.92 K/uL  Auto Lymphocyte # : 0.44 K/uL  Auto Monocyte # : 0.67 K/uL  Auto Eosinophil # : 0.04 K/uL  Auto Basophil # : 0.05 K/uL  Auto Neutrophil % : 75.9 %  Auto Lymphocyte % : 8.6 %  Auto Monocyte % : 12.9 %  Auto Eosinophil % : 0.8 %  Auto Basophil % : 0.9 %      07-15    139  |  100  |  49<H>  ----------------------------<  104<H>  3.9   |  29  |  1.42<H>    Ca    8.8      15 Jul 2022 08:31  Phos  3.9     07-15  Mg     2.4     07-15    TPro  7.4  /  Alb  3.6  /  TBili  0.8  /  DBili  x   /  AST  37  /  ALT  34  /  AlkPhos  55        Urinalysis Basic - ( 2022 03:24 )    Color: Yellow / Appearance: Clear / S.020 / pH: x  Gluc: x / Ketone: NEGATIVE  / Bili: NEGATIVE / Urobili: 0.2 E.U./dL   Blood: x / Protein: NEGATIVE mg/dL / Nitrite: NEGATIVE   Leuk Esterase: NEGATIVE / RBC: x / WBC x   Sq Epi: x / Non Sq Epi: x / Bacteria: x          Radiology :    < from: CT Abdomen and Pelvis No Cont (22 @ 05:03) >  ACC: [id number] EXAM:  CT ABDOMEN AND PELVIS                          PROCEDURE DATE:  2022          INTERPRETATION:  Attending over read. Agree with the below report with   following modifications. Contracted gallbladder versus cystic duct   remnant. Large 12.5 x 7.3 x 11.9 cm lobulated cystic lesion which appears   to be involving the pancreatic neck and head with widening of duodenal   loop. Appears to contain internal septations. Internal density   measurement is 14 Hounsfield units. Findings can be correlated with MR   with gadolinium or CT with IV contrast and endoscopic ultrasound FNA.   Differential diagnosis includes serous cystic tumor, pseudocyst, side   branch IPMN, cystic GIST.Status post hysterectomy. Ovaries are not   identified in the side probably status post bilateral oophorectomy. Air   noted within the urinary bladder correlate with any prior bladder   instrumentation. Colonic diverticulosis particularly involving the   sigmoid. 1.1 cm calcification involving anterior spinal canal at the   inferior T9 level. Differential diagnosis includes extruded calcified   disc, meningioma. Multilevel lumbar degenerative disc disease. Slight   retrolisthesis of L1 on L2. Slight anterolisthesis of L4 on L5.                [facility] RADIOLOGIST PRELIMINARY REPORT      Initial report created on 2022 7:28:36 AM EDT  PROCEDURE INFORMATION:  Exam: CT Abdomen And Pelvis Without Contrast  Exam date and time: 2022 4:31 AM  Age: 86 years old  Clinical indication: Abdominalpain; Patient HX: Lower back pain;   Worsening  renal function    TECHNIQUE:  Imaging protocol: Computed tomography of the abdomen and pelvis without  contrast.    COMPARISON:  DX XR CHEST 2022 5:30 AM    FINDINGS:  Limitations: Lack of intravenous contrast limits evaluation of the solid  viscera and vasculature.    Lungs: 6 mm solid nodule in the left lower lobe on series 2, image 16.   Minimal  linear subsegmental atelectasis versus scarring in the right middle lobe.   No  pleural effusions.    Liver: Mass effect on the inferior aspect of the left hepatic lobe by the  pancreatic mass described below. Otherwise unremarkable, within the   limits of  noncontrast technique.  Gallbladder and bile ducts: Unremarkable. No calcified gallstones. No  intrahepatic or extrahepatic biliary ductal dilation.  Pancreas: There is a 12.1 cm CC x 7.7 cm AP x 12 cm TRV septated cystic   mass in  the upper abdomen which spans midline. This appears to arise from the   body of  the pancreas.  Spleen: Unremarkable. The spleen is normal in size.  Adrenal glands: Unremarkable.  Kidneys and ureters: The kidneys are normal and symmetric in size, without  hydronephrosis, calcifications, or contour-deforming masses. No   calcifications  are identified in the ureters, which are normal in caliber.  Stomach and bowel: The stomach is underdistended and therefore not well  assessed. There is a mass effect on the gastric antrum by the large   pancreatic  mass described above. The small and large bowel are normal in caliber.   There  are multiple diverticula in the sigmoid colon, without pericolonic   inflammatory  changes to suggest diverticulitis.  Appendix: The appendix is not definitively identified, however there are   no  pericecal inflammatory changes to suggest acute appendicitis.    Intraperitoneal space: No ascites, fluid collection, or pneumoperitoneum.  Retroperitoneal space: No retroperitoneal collection or mass.  Vasculature: Mild atherosclerotic vascular calcifications. Normal caliber  abdominal aorta.  Lymph nodes: No pathologically enlarged lymph nodes.  Urinary bladder: Advanced multilevel spondylosis in the lower thoracic and  lumbar spine. Degenerative changes in the bilateral hip and sacroiliac   joints.  Reproductive: The uterus is not identified and is presumed surgically   absent.  No pelvic masses.  Bones/joints: Advanced multilevel spondylosis in the lower thoracic and   lumbar  spine. Degenerative changes in the bilateral hip and sacroiliac joints.  Soft tissues: Unremarkable.    IMPRESSION:  1. Large complex cystic mass in the upper abdomen measuring up to 12.1 cm   which  appears to arise from the body of the pancreas. Further characterization   with  nonemergent abdominal MRI (pancreatic protocol, without and with   contrast) is  recommended.  2. Incidental 6 mm solid left lower lobe nodule. See follow up   recommendations  below.  3. Sigmoid diverticulosis without evidence of diverticulitis.  4. Additional incidental/nonemergent findings are discussed in the body   of the  report.        < from: CT Lumbar Spine No Cont (22 @ 05:03) >  ACC: [id number] EXAM:  CT LUMBAR SPINE                          PROCEDURE DATE:  2022          INTERPRETATION:  PROCEDURE INFORMATION:  Exam: CT Lumbar Spine Without Contrast  Exam date and time: 2022 4:31 AM  Age: 86 years old  Clinical indication: Low back pain    TECHNIQUE:  Imaging protocol: Computed tomography of the lumbar spine without   contrast. Multiplanar reformations are provided, and reviewed in bone and   soft tissue algorithm.    COMPARISON: No relevant prior studies available.    FINDINGS:  Bones/joints: The bones are diffusely demineralized. No fractures are   identified. Lower thoracic and lumbar vertebral bodies maintain normal   height.  Alignment: There is grade 1 retrolisthesis of L1 on L2 and grade 1   anterolisthesis of L4 on L5, the latter in the presence of advanced facet   arthritis.  view shows mild levocurvature.  Discs/Spinal canal/Neural foramina: There is multilevel loss of   intervertebral disc height throughout the lower thoracic and lumbar   spine, most advanced at L1-L2 and L5-S1 where the discs are severely   diminished in height, with vacuum disc phenomenon and disc osteophyte   complex formation. There is moderate acquired central canal narrowing at   T9-T10 secondary to a calcified 1.4 x 1.2 focus, possibly an ossified   extruded disc fragment or possibly burnt out meningioma. At L1-2, there   is posterior disc-osteophyte ridging and ligamentum flavum thickening   with mild-moderate spinal stenosis. There is moderate-severe  acquired   central canal narrowing at L4-L5 secondary to a combination of a disc   bulge with thickening of the ligamentum flavum and geometric distortion   of the central canal by anterolisthesis. Advanced multilevel facet   degeneration is noted throughout the lumbar spine, most severe at L4-L5.   Neural foraminal narrowing is present throughout, right asymmetric mostly   from facet arthritis.    Soft tissues: No acute or suspicious abnormality in the paraspinal soft   tissues.  Other findings: Sigmoid diverticulosis. Mild atherosclerotic vascular   calcifications.    IMPRESSION:    1. No acute abnormality in the lumbar spine.  2. Advanced lumbar spondylosis, most severe at L4-L5 with grade 1   anterolisthesis contributing to spinal stenosis; more moderate spinal   stenosis at L1-2 from posterior osteophyte ridge; see additional  detail   above. Also, spinal stenosis at T9-10 with ventral calcified focus that   may be calcified disc extrusion or possible meningioma.                  Vital Signs Last 24 Hrs  T(C): 36.7 (15 Jul 2022 06:03), Max: 37.4 (2022 21:37)  T(F): 98 (15 Jul 2022 06:03), Max: 99.4 (2022 21:37)  HR: 71 (15 Jul 2022 06:03) (71 - 93)  BP: 104/62 (15 Jul 2022 06:03) (100/66 - 118/66)  BP(mean): --  RR: 18 (15 Jul 2022 06:03) (16 - 18)  SpO2: 97% (15 Jul 2022 06:03) (93% - 97%)    Parameters below as of 15 Jul 2022 06:03  Patient On (Oxygen Delivery Method): nasal cannula  O2 Flow (L/min): 2          REVIEW OF SYSTEMS:  per hpi         Physical Exam: morbidly obese 86 y o woman lying in semi Powell's position , awake , alert , no acute complaints of pain      Neurologic Exam:    Alert and oriented x 3     Motor Exam:    Right UE:               no focal weakness ,  > 3+/5 throughout                                  Left UE:                 no focal weakness,  > 3+/5 throughout       Right LE:                no focal weakness,  > 3+/5 throughout      Left LE:                  no focal weakness,  > 3+/5 throughout          Sensation:         intact to light touch x 4 extremities                       DTR :                     biceps/brachioradialis : equal                                              patella/ankle : equal                                                                               neg Babinski    neg clonus        Gait :  not tested              PM&R Impression : admitted for HERNESTO and back pain / spinal stenosis / ~ meningioma T9/10    1) deconditioned    2) no focal weakness      Recommendations / Plan :     1) Physical / Occupational therapy focusing on therapeutic exercises , bed mobility/transfer out of bed evaluation , progressive ambulation with assistive       devices prn .    2) Anticipated Disposition Plan / Recs  :   d/c home, home P.T.

## 2022-07-15 NOTE — PROGRESS NOTE ADULT - PROBLEM SELECTOR PLAN 11
Fluid: encourage PO fluid intake   E: replete to K>4, Mg>2, Phos>2.5  Nutrition/Diet : DASH/TLC   Ppx: lovenox     Code Status: full code    Dispo: RMF Patient has BMI of 48.3  - Consult nutrition -6 mm solid L lower lobe nodule; no hx smoking   -fup CT chest at 6-12 mos outpatient

## 2022-07-15 NOTE — DIETITIAN INITIAL EVALUATION ADULT - ORAL INTAKE PTA/DIET HISTORY
per patient was not following any restrictions and has no food allergies.Resides on [place].Limited diet recall due to pain

## 2022-07-15 NOTE — PROGRESS NOTE ADULT - PROBLEM SELECTOR PLAN 9
-6 mm solid L lower lobe nodule; no hx smoking   -fup CT chest at 6-12 mos outpatient platelets: 140   most likely reactive   - continue to trend with CBC platelets: 140 --> 131 (7/15)  most likely reactive   - continue to trend with CBC large complex cystic mass from body of pancreas 12 cm  pt reports remote history of following pancreatic mass with MR1. Pt reports not having follow up in years.   -f/up cancer biomarkers: CEA and   -f/up outpatient for abdominal MRI (pancreatic protocol, without and with contrast)

## 2022-07-15 NOTE — PROGRESS NOTE ADULT - PROBLEM SELECTOR PLAN 4
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 Pt reports not having follow up in years.   - -f up cancer biomarkers: CEA and   --f/up outpatient for abdominal MRI (pancreatic protocol, without and with contrast) Pt with known pressure ulcer on sacrum   - stage 2   - wound care consulted  - f/up wound care recs Pt with known pressure ulcer on sacrum   - stage 2   - wound care consulted  - f/up wound care recs  - Consult nutrition Pt with known pressure ulcer on sacrum   - stage 2   - wound care consult

## 2022-07-15 NOTE — DIETITIAN INITIAL EVALUATION ADULT - PERTINENT LABORATORY DATA
07-15    139  |  100  |  49<H>  ----------------------------<  104<H>  3.9   |  29  |  1.42<H>    Ca    8.8      15 Jul 2022 08:31  Phos  3.9     07-15  Mg     2.4     07-15    TPro  7.4  /  Alb  3.6  /  TBili  0.8  /  DBili  x   /  AST  37  /  ALT  34  /  AlkPhos  55  07-14  A1C with Estimated Average Glucose Result: 5.7 % (07-10-22 @ 04:46)

## 2022-07-16 LAB
ANION GAP SERPL CALC-SCNC: 9 MMOL/L — SIGNIFICANT CHANGE UP (ref 5–17)
BUN SERPL-MCNC: 34 MG/DL — HIGH (ref 7–23)
CALCIUM SERPL-MCNC: 8.5 MG/DL — SIGNIFICANT CHANGE UP (ref 8.4–10.5)
CHLORIDE SERPL-SCNC: 100 MMOL/L — SIGNIFICANT CHANGE UP (ref 96–108)
CO2 SERPL-SCNC: 30 MMOL/L — SIGNIFICANT CHANGE UP (ref 22–31)
CREAT ?TM UR-MCNC: 115 MG/DL — SIGNIFICANT CHANGE UP
CREAT SERPL-MCNC: 1.1 MG/DL — SIGNIFICANT CHANGE UP (ref 0.5–1.3)
EGFR: 50 ML/MIN/1.73M2 — LOW
GLUCOSE SERPL-MCNC: 120 MG/DL — HIGH (ref 70–99)
HCT VFR BLD CALC: 30.9 % — LOW (ref 34.5–45)
HCT VFR BLD CALC: 31 % — LOW (ref 34.5–45)
HGB BLD-MCNC: 10 G/DL — LOW (ref 11.5–15.5)
HGB BLD-MCNC: 9.9 G/DL — LOW (ref 11.5–15.5)
MAGNESIUM SERPL-MCNC: 2.2 MG/DL — SIGNIFICANT CHANGE UP (ref 1.6–2.6)
MCHC RBC-ENTMCNC: 31.4 PG — SIGNIFICANT CHANGE UP (ref 27–34)
MCHC RBC-ENTMCNC: 31.8 PG — SIGNIFICANT CHANGE UP (ref 27–34)
MCHC RBC-ENTMCNC: 31.9 GM/DL — LOW (ref 32–36)
MCHC RBC-ENTMCNC: 32.4 GM/DL — SIGNIFICANT CHANGE UP (ref 32–36)
MCV RBC AUTO: 98.4 FL — SIGNIFICANT CHANGE UP (ref 80–100)
MCV RBC AUTO: 98.4 FL — SIGNIFICANT CHANGE UP (ref 80–100)
NRBC # BLD: 0 /100 WBCS — SIGNIFICANT CHANGE UP (ref 0–0)
NRBC # BLD: 0 /100 WBCS — SIGNIFICANT CHANGE UP (ref 0–0)
NT-PROBNP SERPL-SCNC: 4812 PG/ML — HIGH (ref 0–300)
PHOSPHATE SERPL-MCNC: 3.1 MG/DL — SIGNIFICANT CHANGE UP (ref 2.5–4.5)
PLATELET # BLD AUTO: 95 K/UL — LOW (ref 150–400)
PLATELET # BLD AUTO: 96 K/UL — LOW (ref 150–400)
POTASSIUM SERPL-MCNC: 3.6 MMOL/L — SIGNIFICANT CHANGE UP (ref 3.5–5.3)
POTASSIUM SERPL-SCNC: 3.6 MMOL/L — SIGNIFICANT CHANGE UP (ref 3.5–5.3)
RBC # BLD: 3.14 M/UL — LOW (ref 3.8–5.2)
RBC # BLD: 3.15 M/UL — LOW (ref 3.8–5.2)
RBC # FLD: 19.9 % — HIGH (ref 10.3–14.5)
RBC # FLD: 20 % — HIGH (ref 10.3–14.5)
SODIUM SERPL-SCNC: 139 MMOL/L — SIGNIFICANT CHANGE UP (ref 135–145)
UUN UR-MCNC: 965 MG/DL — SIGNIFICANT CHANGE UP
WBC # BLD: 4.71 K/UL — SIGNIFICANT CHANGE UP (ref 3.8–10.5)
WBC # BLD: 5.56 K/UL — SIGNIFICANT CHANGE UP (ref 3.8–10.5)
WBC # FLD AUTO: 4.71 K/UL — SIGNIFICANT CHANGE UP (ref 3.8–10.5)
WBC # FLD AUTO: 5.56 K/UL — SIGNIFICANT CHANGE UP (ref 3.8–10.5)

## 2022-07-16 PROCEDURE — 72146 MRI CHEST SPINE W/O DYE: CPT | Mod: 26

## 2022-07-16 PROCEDURE — 72148 MRI LUMBAR SPINE W/O DYE: CPT | Mod: 26

## 2022-07-16 PROCEDURE — 99233 SBSQ HOSP IP/OBS HIGH 50: CPT | Mod: GC

## 2022-07-16 RX ORDER — CEFAZOLIN SODIUM 1 G
2 VIAL (EA) INJECTION
Qty: 0 | Refills: 0 | DISCHARGE
Start: 2022-07-16 | End: 2022-08-27

## 2022-07-16 RX ADMIN — Medication 100 MILLIGRAM(S): at 06:03

## 2022-07-16 RX ADMIN — OXYCODONE HYDROCHLORIDE 5 MILLIGRAM(S): 5 TABLET ORAL at 19:04

## 2022-07-16 RX ADMIN — Medication 1 TABLET(S): at 12:34

## 2022-07-16 RX ADMIN — Medication 650 MILLIGRAM(S): at 12:34

## 2022-07-16 RX ADMIN — ENOXAPARIN SODIUM 40 MILLIGRAM(S): 100 INJECTION SUBCUTANEOUS at 05:41

## 2022-07-16 RX ADMIN — Medication 650 MILLIGRAM(S): at 19:04

## 2022-07-16 RX ADMIN — CYCLOBENZAPRINE HYDROCHLORIDE 5 MILLIGRAM(S): 10 TABLET, FILM COATED ORAL at 22:34

## 2022-07-16 RX ADMIN — CYCLOBENZAPRINE HYDROCHLORIDE 5 MILLIGRAM(S): 10 TABLET, FILM COATED ORAL at 17:49

## 2022-07-16 RX ADMIN — NYSTATIN CREAM 1 APPLICATION(S): 100000 CREAM TOPICAL at 19:07

## 2022-07-16 RX ADMIN — HYDROMORPHONE HYDROCHLORIDE 0.5 MILLIGRAM(S): 2 INJECTION INTRAMUSCULAR; INTRAVENOUS; SUBCUTANEOUS at 09:10

## 2022-07-16 RX ADMIN — Medication 100 MILLIGRAM(S): at 22:34

## 2022-07-16 RX ADMIN — Medication 100 MILLIGRAM(S): at 17:46

## 2022-07-16 RX ADMIN — OXYCODONE HYDROCHLORIDE 5 MILLIGRAM(S): 5 TABLET ORAL at 12:34

## 2022-07-16 RX ADMIN — OXYCODONE HYDROCHLORIDE 5 MILLIGRAM(S): 5 TABLET ORAL at 22:34

## 2022-07-16 RX ADMIN — OXYCODONE HYDROCHLORIDE 5 MILLIGRAM(S): 5 TABLET ORAL at 13:34

## 2022-07-16 RX ADMIN — Medication 137 MICROGRAM(S): at 05:42

## 2022-07-16 RX ADMIN — OXYCODONE HYDROCHLORIDE 5 MILLIGRAM(S): 5 TABLET ORAL at 06:42

## 2022-07-16 RX ADMIN — NYSTATIN CREAM 1 APPLICATION(S): 100000 CREAM TOPICAL at 05:46

## 2022-07-16 RX ADMIN — Medication 650 MILLIGRAM(S): at 13:34

## 2022-07-16 RX ADMIN — OXYCODONE HYDROCHLORIDE 5 MILLIGRAM(S): 5 TABLET ORAL at 20:04

## 2022-07-16 RX ADMIN — CYCLOBENZAPRINE HYDROCHLORIDE 5 MILLIGRAM(S): 10 TABLET, FILM COATED ORAL at 05:42

## 2022-07-16 RX ADMIN — ENOXAPARIN SODIUM 40 MILLIGRAM(S): 100 INJECTION SUBCUTANEOUS at 17:47

## 2022-07-16 RX ADMIN — OXYCODONE HYDROCHLORIDE 5 MILLIGRAM(S): 5 TABLET ORAL at 05:42

## 2022-07-16 RX ADMIN — HYDROMORPHONE HYDROCHLORIDE 0.5 MILLIGRAM(S): 2 INJECTION INTRAMUSCULAR; INTRAVENOUS; SUBCUTANEOUS at 10:10

## 2022-07-16 NOTE — PROGRESS NOTE ADULT - PROBLEM SELECTOR PLAN 7
Hgb: 10.0 with RDW 20.5, ferritin 793  Consistent with anemia of chronic disease with unknown source  - monitor CBC daily

## 2022-07-16 NOTE — PROGRESS NOTE ADULT - PROBLEM SELECTOR PLAN 5
PT HAS HIS PHYSICAL SCHEDULED FOR 07-27-21 AND WOULD LIKE TO GET HIS LABS DRAWN BEFORE APPT. PLEASE CALL PT WHEN ORDERS ARE READY.    SpO2 drops to 85% on RA  - SpO2 at 97% on 2L NC

## 2022-07-16 NOTE — PROGRESS NOTE ADULT - PROBLEM SELECTOR PLAN 2
Pt reports acute back pain after quick movement   - on CT: Advanced lumbar spondylosis, most severe at L4-L5 with grade 1 anterolisthesis contributing to spinal stenosis; more moderate spinal stenosis at L1-2 from posterior osteophyte ridge; see additional  detail above. Also, spinal stenosis at T9-10 with ventral calcified focus that may be calcified disc extrusion or possible meningioma  - lidocaine patch daily, cyclobenzaprine 5 mg 3x daily, Tylenol 650 mg q6h, oxycodone 5 mg q6h  - f/u MRI of lumbar spine  - follow up neurosurgery recs (consulted for severe stenosis and possible meningioma)

## 2022-07-16 NOTE — PROGRESS NOTE ADULT - SUBJECTIVE AND OBJECTIVE BOX
INTERVAL HPI/OVERNIGHT EVENTS:  Patient was seen and examined at bedside. As per patient, still having severe sharp stabbing lower back pain. The pain improves at rest.  More comfortable compared to yesterday. Patient denies: fever, chills, CP, SOB, N/V/D.    VITAL SIGNS:  T(F): 99.4 (07-16-22 @ 13:22)  HR: 78 (07-16-22 @ 13:22)  BP: 152/82 (07-16-22 @ 13:22)  RR: 18 (07-16-22 @ 13:22)  SpO2: 97% (07-16-22 @ 13:22)  Wt(kg): --    PHYSICAL EXAM:    Constitutional: Patient is awake and alert; Morbidly obese; Appears to be in extreme pain with any movement  HEENT: EOMI, CN II-XII intact, sclera non-icteric  Respiratory: CTA b/l, no wheezing, no rhonchi, no rales  Cardiovascular: normal S1S2; +3/6 systolic murmur loudest at the right upper sternal border that radiates to the carotids  Gastrointestinal: soft, NTND, no masses palpable; erythematous scaly patches along abdominal folds concerning for intertrigo; bruising noted bilaterally in lower abdominal quadrants  Extremities: Warm, well perfused; radial and posterior tibial pulses present bilaterally; no edema; small 1cm crusted over lesion noted on L hip, intertrigo in popliteal fossa bilaterally; pain upon palpation of lower extremities bilaterally   Patient was in extreme pain with any movement so not able to visualize back or sacral ulcer    MEDICATIONS  (STANDING):  acetaminophen     Tablet .. 650 milliGRAM(s) Oral every 6 hours  ceFAZolin   IVPB      ceFAZolin   IVPB 2000 milliGRAM(s) IV Intermittent every 8 hours  cyclobenzaprine 5 milliGRAM(s) Oral three times a day  enoxaparin Injectable 40 milliGRAM(s) SubCutaneous every 12 hours  HYDROmorphone  Injectable 0.5 milliGRAM(s) IV Push once  levothyroxine 137 MICROGram(s) Oral every 24 hours  lidocaine   4% Patch 1 Patch Transdermal daily  metoprolol succinate ER 12.5 milliGRAM(s) Oral every 24 hours  multivitamin 1 Tablet(s) Oral daily  nystatin Powder 1 Application(s) Topical two times a day  oxyCODONE    IR 5 milliGRAM(s) Oral every 6 hours    Allergies    Levaquin (Hives)    Intolerances    LABS:                        9.9    4.71  )-----------( 95       ( 16 Jul 2022 05:30 )             31.0     07-16    139  |  100  |  34<H>  ----------------------------<  120<H>  3.6   |  30  |  1.10    Ca    8.5      16 Jul 2022 05:30  Phos  3.1     07-16  Mg     2.2     07-16    BNP 4812    Anaerobic culture bottle grew Staph lugdunensis - switched to cefazolin 2g q8h as per ID recs

## 2022-07-16 NOTE — PROGRESS NOTE ADULT - PROBLEM SELECTOR PLAN 8
Patient on Lasix 20 mg PO M/W/F and losartan 25 mg qd; Toprol 12.5 qd at home  - recent admission to [facility] due to HF exacerbation   - c/w home meds toprol  - hold lasix and losartan in the setting of HERNESTO   - DASH/TLC diet  - f/u outpatient HF team

## 2022-07-16 NOTE — PROGRESS NOTE ADULT - ASSESSMENT
85 yo female with PMH HFpEF (diagnosed in last admission 7/9-12, last ECHO 7/9), sacral decubitus ulcer, who presented to ED w atraumatic back pain 1 day after hospital discharge for acute hypoxic resp failure 2/2 to CHF exacerbation, was admitted for pre renal HERNESTO, and severe back pain found to have +Bcx treated with cefazolin.

## 2022-07-16 NOTE — PROGRESS NOTE ADULT - PROBLEM SELECTOR PLAN 9
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 Pt reports not having follow up in years.   - CEA and  not elevated  -f/up outpatient for abdominal MRI (pancreatic protocol, without and with contrast)

## 2022-07-16 NOTE — PROGRESS NOTE ADULT - PROBLEM SELECTOR PLAN 1
BCx 7/14 growing Staph lugdunensis; unclear source; cont. work-up and mgmt per ID recs -- f/u MRI T and L spine, TTE, repeat cultures; cont. IV Ancef for now

## 2022-07-16 NOTE — PROGRESS NOTE ADULT - PROBLEM SELECTOR PLAN 3
CT L-spine shows spondylosis and stenosis; cont. pain control, PT; checking MRI T and L spine, per Neurosurgery recs

## 2022-07-16 NOTE — PROGRESS NOTE ADULT - PROBLEM SELECTOR PLAN 3
Resolving. Patient recently admitted for CHF. Last dose of lasix: 7/13. Since discharge, significantly decreased fluid intake. In ED: BUN: 60; Cr: 2.03  - most likely prerenal 2/2 to reduced PO intake and in the setting of lasix use   - unremarkable UA   - encouraged increased PO fluid intake   - nephrotoxic medications held (losartan, lasix)

## 2022-07-16 NOTE — PROGRESS NOTE ADULT - SUBJECTIVE AND OBJECTIVE BOX
Patient is a 86y old  Female who presents with a chief complaint of HERNESTO and pain management (16 Jul 2022 13:33)      INTERVAL HPI/OVERNIGHT EVENTS:    Pt. seen and examined earlier today  Pt. c/o LBP w/ activity, but reports pain is better controlled; using Tylenol and oxycodone  Pt. denies F/C, CP, SOB    Review of Systems: 12 point review of systems otherwise negative    MEDICATIONS  (STANDING):  acetaminophen     Tablet .. 650 milliGRAM(s) Oral every 6 hours  ceFAZolin   IVPB      ceFAZolin   IVPB 2000 milliGRAM(s) IV Intermittent every 8 hours  cyclobenzaprine 5 milliGRAM(s) Oral three times a day  enoxaparin Injectable 40 milliGRAM(s) SubCutaneous every 12 hours  HYDROmorphone  Injectable 0.5 milliGRAM(s) IV Push once  levothyroxine 137 MICROGram(s) Oral every 24 hours  lidocaine   4% Patch 1 Patch Transdermal daily  metoprolol succinate ER 12.5 milliGRAM(s) Oral every 24 hours  multivitamin 1 Tablet(s) Oral daily  nystatin Powder 1 Application(s) Topical two times a day  oxyCODONE    IR 5 milliGRAM(s) Oral every 6 hours    MEDICATIONS  (PRN):      Allergies    Levaquin (Hives)    Intolerances          Vital Signs Last 24 Hrs  T(C): 37.4 (16 Jul 2022 13:22), Max: 37.6 (16 Jul 2022 04:54)  T(F): 99.4 (16 Jul 2022 13:22), Max: 99.7 (16 Jul 2022 04:54)  HR: 78 (16 Jul 2022 13:22) (75 - 79)  BP: 152/82 (16 Jul 2022 13:22) (118/70 - 160/75)  BP(mean): --  RR: 18 (16 Jul 2022 13:22) (18 - 19)  SpO2: 97% (16 Jul 2022 13:22) (97% - 99%)    Parameters below as of 16 Jul 2022 13:22  Patient On (Oxygen Delivery Method): nasal cannula  O2 Flow (L/min): 2    CAPILLARY BLOOD GLUCOSE            Physical Exam:  (earlier today)  Daily     Daily   General:  non-toxic and comfortable-appearing in NAD  HEENT:  MMM  CV:  RRR, no JVD  Lungs:  CTA B/L, normal WOB on 2L NC  Abdomen:  soft NT ND morbidly obese  Extremities:  no edema B/L LE  Skin:  WWP  Neuro:  AAOx3    LABS:                        9.9    4.71  )-----------( 95       ( 16 Jul 2022 05:30 )             31.0     07-16    139  |  100  |  34<H>  ----------------------------<  120<H>  3.6   |  30  |  1.10    Ca    8.5      16 Jul 2022 05:30  Phos  3.1     07-16  Mg     2.2     07-16

## 2022-07-16 NOTE — PROGRESS NOTE ADULT - PROBLEM SELECTOR PLAN 1
admission bloodwork showed no leukocytosis (WBC: 6.43) with bandemia 11%. Patient was afebrile, stable vitals with no sx of active infection.   - Bcx positive for Staph lungdunensis  - Started on 1500 mg vancomycin and transitioned to IV cefazolin (D2) as per IV recs  - UA negative for UTI, RVP negative; no focal consolidations on CXR from a few days ago (past admission)  - continue to trend CBC w diff  - a/w ECHO to rule out endocarditis in the setting of bacteremia

## 2022-07-17 LAB
-  AMPICILLIN/SULBACTAM: SIGNIFICANT CHANGE UP
-  CEFAZOLIN: SIGNIFICANT CHANGE UP
-  CLINDAMYCIN: SIGNIFICANT CHANGE UP
-  ERYTHROMYCIN: SIGNIFICANT CHANGE UP
-  GENTAMICIN: SIGNIFICANT CHANGE UP
-  OXACILLIN: SIGNIFICANT CHANGE UP
-  RIFAMPIN: SIGNIFICANT CHANGE UP
-  TETRACYCLINE: SIGNIFICANT CHANGE UP
-  TRIMETHOPRIM/SULFAMETHOXAZOLE: SIGNIFICANT CHANGE UP
-  VANCOMYCIN: SIGNIFICANT CHANGE UP
ANION GAP SERPL CALC-SCNC: 11 MMOL/L — SIGNIFICANT CHANGE UP (ref 5–17)
ANISOCYTOSIS BLD QL: SLIGHT — SIGNIFICANT CHANGE UP
BASOPHILS # BLD AUTO: 0 K/UL — SIGNIFICANT CHANGE UP (ref 0–0.2)
BASOPHILS NFR BLD AUTO: 0 % — SIGNIFICANT CHANGE UP (ref 0–2)
BUN SERPL-MCNC: 28 MG/DL — HIGH (ref 7–23)
CALCIUM SERPL-MCNC: 9.2 MG/DL — SIGNIFICANT CHANGE UP (ref 8.4–10.5)
CHLORIDE SERPL-SCNC: 97 MMOL/L — SIGNIFICANT CHANGE UP (ref 96–108)
CO2 SERPL-SCNC: 30 MMOL/L — SIGNIFICANT CHANGE UP (ref 22–31)
CREAT SERPL-MCNC: 0.88 MG/DL — SIGNIFICANT CHANGE UP (ref 0.5–1.3)
CULTURE RESULTS: SIGNIFICANT CHANGE UP
CULTURE RESULTS: SIGNIFICANT CHANGE UP
EGFR: 65 ML/MIN/1.73M2 — SIGNIFICANT CHANGE UP
EOSINOPHIL # BLD AUTO: 0.25 K/UL — SIGNIFICANT CHANGE UP (ref 0–0.5)
EOSINOPHIL NFR BLD AUTO: 4.3 % — SIGNIFICANT CHANGE UP (ref 0–6)
GIANT PLATELETS BLD QL SMEAR: PRESENT — SIGNIFICANT CHANGE UP
GLUCOSE SERPL-MCNC: 91 MG/DL — SIGNIFICANT CHANGE UP (ref 70–99)
HCT VFR BLD CALC: 35 % — SIGNIFICANT CHANGE UP (ref 34.5–45)
HGB BLD-MCNC: 11.2 G/DL — LOW (ref 11.5–15.5)
LYMPHOCYTES # BLD AUTO: 0.51 K/UL — LOW (ref 1–3.3)
LYMPHOCYTES # BLD AUTO: 8.7 % — LOW (ref 13–44)
MACROCYTES BLD QL: SIGNIFICANT CHANGE UP
MAGNESIUM SERPL-MCNC: 2.3 MG/DL — SIGNIFICANT CHANGE UP (ref 1.6–2.6)
MANUAL SMEAR VERIFICATION: SIGNIFICANT CHANGE UP
MCHC RBC-ENTMCNC: 31.5 PG — SIGNIFICANT CHANGE UP (ref 27–34)
MCHC RBC-ENTMCNC: 32 GM/DL — SIGNIFICANT CHANGE UP (ref 32–36)
MCV RBC AUTO: 98.6 FL — SIGNIFICANT CHANGE UP (ref 80–100)
METHOD TYPE: SIGNIFICANT CHANGE UP
MONOCYTES # BLD AUTO: 1.16 K/UL — HIGH (ref 0–0.9)
MONOCYTES NFR BLD AUTO: 20 % — HIGH (ref 2–14)
NEUTROPHILS # BLD AUTO: 3.7 K/UL — SIGNIFICANT CHANGE UP (ref 1.8–7.4)
NEUTROPHILS NFR BLD AUTO: 60 % — SIGNIFICANT CHANGE UP (ref 43–77)
NEUTS BAND # BLD: 3.5 % — SIGNIFICANT CHANGE UP (ref 0–8)
ORGANISM # SPEC MICROSCOPIC CNT: SIGNIFICANT CHANGE UP
OVALOCYTES BLD QL SMEAR: SLIGHT — SIGNIFICANT CHANGE UP
PHOSPHATE SERPL-MCNC: 3.1 MG/DL — SIGNIFICANT CHANGE UP (ref 2.5–4.5)
PLAT MORPH BLD: ABNORMAL
PLATELET # BLD AUTO: 136 K/UL — LOW (ref 150–400)
POIKILOCYTOSIS BLD QL AUTO: SLIGHT — SIGNIFICANT CHANGE UP
POLYCHROMASIA BLD QL SMEAR: SLIGHT — SIGNIFICANT CHANGE UP
POTASSIUM SERPL-MCNC: 4.2 MMOL/L — SIGNIFICANT CHANGE UP (ref 3.5–5.3)
POTASSIUM SERPL-SCNC: 4.2 MMOL/L — SIGNIFICANT CHANGE UP (ref 3.5–5.3)
RBC # BLD: 3.55 M/UL — LOW (ref 3.8–5.2)
RBC # FLD: 19.3 % — HIGH (ref 10.3–14.5)
RBC BLD AUTO: ABNORMAL
SODIUM SERPL-SCNC: 138 MMOL/L — SIGNIFICANT CHANGE UP (ref 135–145)
SPECIMEN SOURCE: SIGNIFICANT CHANGE UP
SPECIMEN SOURCE: SIGNIFICANT CHANGE UP
VARIANT LYMPHS # BLD: 3.5 % — SIGNIFICANT CHANGE UP (ref 0–6)
WBC # BLD: 5.82 K/UL — SIGNIFICANT CHANGE UP (ref 3.8–10.5)
WBC # FLD AUTO: 5.82 K/UL — SIGNIFICANT CHANGE UP (ref 3.8–10.5)

## 2022-07-17 PROCEDURE — 99233 SBSQ HOSP IP/OBS HIGH 50: CPT | Mod: GC

## 2022-07-17 PROCEDURE — 99232 SBSQ HOSP IP/OBS MODERATE 35: CPT

## 2022-07-17 PROCEDURE — 99221 1ST HOSP IP/OBS SF/LOW 40: CPT

## 2022-07-17 RX ORDER — ZINC SULFATE TAB 220 MG (50 MG ZINC EQUIVALENT) 220 (50 ZN) MG
220 TAB ORAL DAILY
Refills: 0 | Status: DISCONTINUED | OUTPATIENT
Start: 2022-07-17 | End: 2022-07-22

## 2022-07-17 RX ORDER — ASCORBIC ACID 60 MG
500 TABLET,CHEWABLE ORAL DAILY
Refills: 0 | Status: DISCONTINUED | OUTPATIENT
Start: 2022-07-17 | End: 2022-07-22

## 2022-07-17 RX ADMIN — Medication 650 MILLIGRAM(S): at 07:12

## 2022-07-17 RX ADMIN — Medication 50 MILLIGRAM(S): at 23:43

## 2022-07-17 RX ADMIN — Medication 137 MICROGRAM(S): at 07:12

## 2022-07-17 RX ADMIN — Medication 1 TABLET(S): at 13:11

## 2022-07-17 RX ADMIN — CYCLOBENZAPRINE HYDROCHLORIDE 5 MILLIGRAM(S): 10 TABLET, FILM COATED ORAL at 07:12

## 2022-07-17 RX ADMIN — Medication 12.5 MILLIGRAM(S): at 17:27

## 2022-07-17 RX ADMIN — Medication 650 MILLIGRAM(S): at 19:34

## 2022-07-17 RX ADMIN — NYSTATIN CREAM 1 APPLICATION(S): 100000 CREAM TOPICAL at 18:37

## 2022-07-17 RX ADMIN — Medication 100 MILLIGRAM(S): at 17:27

## 2022-07-17 RX ADMIN — Medication 650 MILLIGRAM(S): at 18:34

## 2022-07-17 RX ADMIN — ZINC SULFATE TAB 220 MG (50 MG ZINC EQUIVALENT) 220 MILLIGRAM(S): 220 (50 ZN) TAB at 17:26

## 2022-07-17 RX ADMIN — Medication 650 MILLIGRAM(S): at 08:00

## 2022-07-17 RX ADMIN — Medication 500 MILLIGRAM(S): at 13:10

## 2022-07-17 RX ADMIN — Medication 650 MILLIGRAM(S): at 02:02

## 2022-07-17 RX ADMIN — ENOXAPARIN SODIUM 40 MILLIGRAM(S): 100 INJECTION SUBCUTANEOUS at 18:34

## 2022-07-17 RX ADMIN — OXYCODONE HYDROCHLORIDE 5 MILLIGRAM(S): 5 TABLET ORAL at 20:21

## 2022-07-17 RX ADMIN — ENOXAPARIN SODIUM 40 MILLIGRAM(S): 100 INJECTION SUBCUTANEOUS at 07:12

## 2022-07-17 RX ADMIN — OXYCODONE HYDROCHLORIDE 5 MILLIGRAM(S): 5 TABLET ORAL at 14:35

## 2022-07-17 RX ADMIN — Medication 650 MILLIGRAM(S): at 03:00

## 2022-07-17 RX ADMIN — OXYCODONE HYDROCHLORIDE 5 MILLIGRAM(S): 5 TABLET ORAL at 15:35

## 2022-07-17 RX ADMIN — Medication 100 MILLIGRAM(S): at 07:13

## 2022-07-17 RX ADMIN — OXYCODONE HYDROCHLORIDE 5 MILLIGRAM(S): 5 TABLET ORAL at 00:23

## 2022-07-17 RX ADMIN — NYSTATIN CREAM 1 APPLICATION(S): 100000 CREAM TOPICAL at 07:11

## 2022-07-17 RX ADMIN — Medication 650 MILLIGRAM(S): at 13:11

## 2022-07-17 RX ADMIN — Medication 650 MILLIGRAM(S): at 14:11

## 2022-07-17 RX ADMIN — CYCLOBENZAPRINE HYDROCHLORIDE 5 MILLIGRAM(S): 10 TABLET, FILM COATED ORAL at 17:27

## 2022-07-17 NOTE — PROGRESS NOTE ADULT - PROBLEM SELECTOR PLAN 3
MRI shows "multilevel degenerative disc disease. Probable large central disc herniation T9/10 with partially calcified extruded disc material extending superiorly behind the T9 vertebral body and compressing the spinal cord;" cont. pain control, non-operative mgmt per Neurosurgery recs; if any change in neuro exam, will notify Neurosurgery

## 2022-07-17 NOTE — PROGRESS NOTE ADULT - PROBLEM SELECTOR PLAN 1
BCx 7/14 growing Staph lugdunensis; unclear source; cont. work-up and mgmt per ID recs -- gallium scan, repeat TTE, f/u repeat cultures; cont. IV Ancef for now

## 2022-07-17 NOTE — PROGRESS NOTE ADULT - ASSESSMENT
87 yo female with morbid obesity, recent admission for HF, discharged two days ago, now presents with severe acute LBP, bandemia, found to have S. lugdunensis BSI in setting of MRI showing degenerative disc disease, spinal stenosis, and T9-10 herniation with cord compression; no overt findings of osteomyelitis or discitis on MRI.   - f/u surveillance bcx 7/16--ngtd  - repeat TTE  - gallium scan  - continue cefazolin 2g IV q8h     d/w primary team    ID Team 2

## 2022-07-17 NOTE — PROGRESS NOTE ADULT - SUBJECTIVE AND OBJECTIVE BOX
Subjective: Patient examined at bedside. Patient reports low back pain, unable to assess tenderness on palpation due to pain. pain rated as 8/10 in severity. denies radiation of pain to b/l LE, denies numbness, paresthesias, saddle anesthesia.     T(C): 37.4 (07-16-22 @ 20:48), Max: 37.6 (07-16-22 @ 04:54)  HR: 64 (07-16-22 @ 20:48) (64 - 79)  BP: 119/55 (07-16-22 @ 20:48) (119/55 - 160/75)  RR: 18 (07-16-22 @ 20:48) (18 - 18)  SpO2: 96% (07-16-22 @ 20:48) (96% - 97%)  Wt(kg): --    Exam:  General: NAD, pt is comfortable, lying down in bed   HEENT: EOMI b/l, face symmetric, tongue midline, neck FROM  Cardiovascular: Regular rate and rhythm  Respiratory: nonlabored breathing, normal chest rise, on 3L NC  GI: abdomen soft, nontender, nondistended  Neuro: CN II-XII grossly intact, PERRL 3mm briskly reactive   A&Ox3, No aphasia, speech clear, no dysmetria, no pronator drift. Follows commands.  BRITT x4 spontaneously, sensation intact throughout, +R Hoffmans   b/l UE 5/5 strength, b/l HF pain limited 2/5, b/l KF/DF/EHL/PF 5/5  Extremities: distal pulses 2+ x4      CBC Full  -  ( 16 Jul 2022 16:31 )  WBC Count : 5.56 K/uL  RBC Count : 3.14 M/uL  Hemoglobin : 10.0 g/dL  Hematocrit : 30.9 %  Platelet Count - Automated : 96 K/uL  Mean Cell Volume : 98.4 fl  Mean Cell Hemoglobin : 31.8 pg  Mean Cell Hemoglobin Concentration : 32.4 gm/dL  Auto Neutrophil # : x  Auto Lymphocyte # : x  Auto Monocyte # : x  Auto Eosinophil # : x  Auto Basophil # : x  Auto Neutrophil % : x  Auto Lymphocyte % : x  Auto Monocyte % : x  Auto Eosinophil % : x  Auto Basophil % : x    07-16    139  |  100  |  34<H>  ----------------------------<  120<H>  3.6   |  30  |  1.10    Ca    8.5      16 Jul 2022 05:30  Phos  3.1     07-16  Mg     2.2     07-16        Imaging:  < from: MR Thoracic Spine No Cont (07.16.22 @ 12:39) >  IMPRESSION: Multilevel degenerative disc disease. Probable large central   disc herniation T9/10 with partially calcified extruded disc material   extending superiorly behind the T9 vertebral body and compressing the   spinal cord. Alternative considerations would include a meningioma. This   could be further evaluated with a contrast study.      < from: MR Lumbar Spine No Cont (07.16.22 @ 12:38) >  IMPRESSION: Grade1 retrolisthesis of L1 on L2 and anterolisthesis of L4   on L5. Multilevel degenerative disc disease with spinal stenosis and   neural foramen narrowing as detailed above.    < from: CT Lumbar Spine No Cont (07.14.22 @ 05:03) >  IMPRESSION:    1. No acute abnormality in the lumbar spine.  2. Advanced lumbar spondylosis, most severe at L4-L5 with grade 1   anterolisthesis contributing to spinal stenosis; more moderate spinal   stenosis at L1-2 from posterior osteophyte ridge; see additional  detail   above. Also, spinal stenosis at T9-10 with ventral calcified focus that   may be calcified disc extrusion or possible meningioma.          Assessment/Plan:  86 F with PMH HFpEF (last ECHO 7/9) , HTN, RA, hypothyroidism, morbid obesity, and sacral decubitus ulcer who presented to ED with atraumatic back pain that is worse with movement, non-radiating and unrelieved with NSAIDS. CT Lumbar spine demonstrating lumbar spinal stenosis and ventral calcified disc vs. meningioma at T9/T10.     Plan:  - continue with pain control, add additional PRN medications  - consider pain management consult   - final plan pending neurosurgeon assessment/plan   - continue with care as per primary     Subjective: Patient examined at bedside. Patient reports low back pain, unable to assess tenderness on palpation due to pain. pain rated as 8/10 in severity. denies radiation of pain to b/l LE, denies numbness, paresthesias, saddle anesthesia.     T(C): 37.4 (07-16-22 @ 20:48), Max: 37.6 (07-16-22 @ 04:54)  HR: 64 (07-16-22 @ 20:48) (64 - 79)  BP: 119/55 (07-16-22 @ 20:48) (119/55 - 160/75)  RR: 18 (07-16-22 @ 20:48) (18 - 18)  SpO2: 96% (07-16-22 @ 20:48) (96% - 97%)  Wt(kg): --    Exam:  General: NAD, pt is comfortable, lying down in bed   HEENT: EOMI b/l, face symmetric, tongue midline, neck FROM  Cardiovascular: Regular rate and rhythm  Respiratory: nonlabored breathing, normal chest rise, on 3L NC  GI: abdomen soft, nontender, nondistended  Neuro: CN II-XII grossly intact, PERRL 3mm briskly reactive   A&Ox3, No aphasia, speech clear, no dysmetria, no pronator drift. Follows commands.  BRITT x4 spontaneously, sensation intact throughout, +R Hoffmans   b/l UE 5/5 strength, b/l HF pain limited 2/5, b/l KF/DF/EHL/PF 5/5  Extremities: distal pulses 2+ x4, b/l pitting edema      CBC Full  -  ( 16 Jul 2022 16:31 )  WBC Count : 5.56 K/uL  RBC Count : 3.14 M/uL  Hemoglobin : 10.0 g/dL  Hematocrit : 30.9 %  Platelet Count - Automated : 96 K/uL  Mean Cell Volume : 98.4 fl  Mean Cell Hemoglobin : 31.8 pg  Mean Cell Hemoglobin Concentration : 32.4 gm/dL  Auto Neutrophil # : x  Auto Lymphocyte # : x  Auto Monocyte # : x  Auto Eosinophil # : x  Auto Basophil # : x  Auto Neutrophil % : x  Auto Lymphocyte % : x  Auto Monocyte % : x  Auto Eosinophil % : x  Auto Basophil % : x    07-16    139  |  100  |  34<H>  ----------------------------<  120<H>  3.6   |  30  |  1.10    Ca    8.5      16 Jul 2022 05:30  Phos  3.1     07-16  Mg     2.2     07-16        Imaging:  < from: MR Thoracic Spine No Cont (07.16.22 @ 12:39) >  IMPRESSION: Multilevel degenerative disc disease. Probable large central   disc herniation T9/10 with partially calcified extruded disc material   extending superiorly behind the T9 vertebral body and compressing the   spinal cord. Alternative considerations would include a meningioma. This   could be further evaluated with a contrast study.      < from: MR Lumbar Spine No Cont (07.16.22 @ 12:38) >  IMPRESSION: Grade1 retrolisthesis of L1 on L2 and anterolisthesis of L4   on L5. Multilevel degenerative disc disease with spinal stenosis and   neural foramen narrowing as detailed above.    < from: CT Lumbar Spine No Cont (07.14.22 @ 05:03) >  IMPRESSION:    1. No acute abnormality in the lumbar spine.  2. Advanced lumbar spondylosis, most severe at L4-L5 with grade 1   anterolisthesis contributing to spinal stenosis; more moderate spinal   stenosis at L1-2 from posterior osteophyte ridge; see additional  detail   above. Also, spinal stenosis at T9-10 with ventral calcified focus that   may be calcified disc extrusion or possible meningioma.          Assessment/Plan:  86 F with PMH HFpEF (last ECHO 7/9) , HTN, RA, hypothyroidism, morbid obesity, and sacral decubitus ulcer who presented to ED with atraumatic back pain that is worse with movement, non-radiating and unrelieved with NSAIDS. CT Lumbar spine demonstrating lumbar spinal stenosis and ventral calcified disc vs. meningioma at T9/T10.     Plan:  - continue with pain control, add additional PRN medications  - consider pain management consult   - final plan pending neurosurgeon assessment/plan   - continue with care as per primary

## 2022-07-17 NOTE — PROVIDER CONTACT NOTE (MEDICATION) - SITUATION
Provider made aware that patient received oxycodone as scheduled at 22:34 pm. Oxycodone is scheduled Q6. Last dose was administered at 19:04. This was not realized at time of administration. Ordered by Provider Prakash to hold next scheduled dose of oxycodone.

## 2022-07-17 NOTE — PROGRESS NOTE ADULT - SUBJECTIVE AND OBJECTIVE BOX
Patient is a 86y old  Female who presents with a chief complaint of HERNESTO and pain management (17 Jul 2022 14:56)      INTERVAL HPI/OVERNIGHT EVENTS:    Pt. seen and examined earlier today  MRI findings d/w Pt.  Pt. c/o LBP w/ movement, a/w RLE radiculopathy; Pt. denies bowel/bladder incontinence, loss of sensation  No F/C    Review of Systems: 12 point review of systems otherwise negative    MEDICATIONS  (STANDING):  acetaminophen     Tablet .. 650 milliGRAM(s) Oral every 6 hours  ascorbic acid 500 milliGRAM(s) Oral daily  ceFAZolin   IVPB 2000 milliGRAM(s) IV Intermittent every 8 hours  ceFAZolin   IVPB      cyclobenzaprine 5 milliGRAM(s) Oral three times a day  enoxaparin Injectable 40 milliGRAM(s) SubCutaneous every 12 hours  HYDROmorphone  Injectable 0.5 milliGRAM(s) IV Push once  levothyroxine 137 MICROGram(s) Oral every 24 hours  lidocaine   4% Patch 1 Patch Transdermal daily  metoprolol succinate ER 12.5 milliGRAM(s) Oral every 24 hours  multivitamin 1 Tablet(s) Oral daily  nystatin Powder 1 Application(s) Topical two times a day  oxyCODONE    IR 5 milliGRAM(s) Oral every 6 hours  zinc sulfate 220 milliGRAM(s) Oral daily    MEDICATIONS  (PRN):      Allergies    Levaquin (Hives)    Intolerances          Vital Signs Last 24 Hrs  T(C): 36.9 (17 Jul 2022 05:56), Max: 37.4 (16 Jul 2022 20:48)  T(F): 98.4 (17 Jul 2022 05:56), Max: 99.3 (16 Jul 2022 20:48)  HR: 73 (17 Jul 2022 05:56) (64 - 75)  BP: 126/74 (17 Jul 2022 05:56) (119/55 - 131/60)  BP(mean): --  RR: 18 (17 Jul 2022 05:56) (18 - 18)  SpO2: 96% (17 Jul 2022 05:56) (96% - 96%)    Parameters below as of 17 Jul 2022 05:56  Patient On (Oxygen Delivery Method): nasal cannula  O2 Flow (L/min): 2    CAPILLARY BLOOD GLUCOSE          07-16 @ 07:01  -  07-17 @ 07:00  --------------------------------------------------------  IN: 320 mL / OUT: 0 mL / NET: 320 mL        Physical Exam:  (earlier today)  Daily     Daily   General:  non-toxic and comfortable-appearing in NAD  HEENT:  MMM  CV:  RRR, no JVD  Lungs:  CTA B/L, normal WOB on 2L NC  Abdomen:  soft NT ND morbidly obese  Extremities:  no edema B/L LE  Skin:  WWP  Neuro:  AAOx3, B/L LE ROM limited due to pain, sensation to light touch intact; rest of exam per PGY-1      LABS:                        11.2   5.82  )-----------( 136      ( 17 Jul 2022 05:30 )             35.0     07-17    138  |  97  |  28<H>  ----------------------------<  91  4.2   |  30  |  0.88    Ca    9.2      17 Jul 2022 05:30  Phos  3.1     07-17  Mg     2.3     07-17

## 2022-07-17 NOTE — PROGRESS NOTE ADULT - SUBJECTIVE AND OBJECTIVE BOX
Subjective:  Pain poorly controlled on current regimen; significant muscle spasm and RLE radicular pain with movement     PAST MEDICAL & SURGICAL HISTORY:  Fluid retention      Hypothyroidism      H/O CHF      S/P appendectomy      S/P cholecystectomy      S/P hysterectomy        FAMILY HISTORY:      Allergies    Levaquin (Hives)    Intolerances        MEDICATIONS:  Antibiotics:  ceFAZolin   IVPB 2000 milliGRAM(s) IV Intermittent every 8 hours  ceFAZolin   IVPB        Neuro:  acetaminophen     Tablet .. 650 milliGRAM(s) Oral every 6 hours  cyclobenzaprine 5 milliGRAM(s) Oral three times a day  HYDROmorphone  Injectable 0.5 milliGRAM(s) IV Push once  oxyCODONE    IR 5 milliGRAM(s) Oral every 6 hours    Anticoagulation:  enoxaparin Injectable 40 milliGRAM(s) SubCutaneous every 12 hours    OTHER:  levothyroxine 137 MICROGram(s) Oral every 24 hours  lidocaine   4% Patch 1 Patch Transdermal daily  metoprolol succinate ER 12.5 milliGRAM(s) Oral every 24 hours  nystatin Powder 1 Application(s) Topical two times a day    IVF:  ascorbic acid 500 milliGRAM(s) Oral daily  multivitamin 1 Tablet(s) Oral daily  zinc sulfate 220 milliGRAM(s) Oral daily      Vital Signs Last 24 Hrs  T(C): 36.9 (17 Jul 2022 05:56), Max: 37.4 (16 Jul 2022 13:22)  T(F): 98.4 (17 Jul 2022 05:56), Max: 99.4 (16 Jul 2022 13:22)  HR: 73 (17 Jul 2022 05:56) (64 - 78)  BP: 126/74 (17 Jul 2022 05:56) (119/55 - 152/82)  BP(mean): --  RR: 18 (17 Jul 2022 05:56) (18 - 18)  SpO2: 96% (17 Jul 2022 05:56) (96% - 97%)    Parameters below as of 17 Jul 2022 05:56  Patient On (Oxygen Delivery Method): nasal cannula  O2 Flow (L/min): 2      PHYSICAL EXAM:  General: NAD, morbidly obese patient, lying uncomfortably in bed, on NC  HEENT: CN II-XII grossly intact, PERRL 3mm briskly reactive, EOMI b/l, face symmetric, tongue midline, neck FROM  Cardiovascular: RRR, +murmur   Respiratory:  Diminished lung sounds, no wheezing, rhonchi, or crackles   GI: normoactive BS to auscultation, abd soft, NTND   Neuro: A&Ox3, No aphasia, speech clear, no dysmetria, no pronator drift. Follows commands.  BRITT x4 spontaneously, UE bilaterally 5/5 strength throughout, Bilateral LE pain limited, with assistance HF 4-/5, KF/KE isolated 4/5, DF/PF 5/5, EHL 5/5. SILT throughout. Negative hoffmans, negative clonus, normoreflexic throughout.   Extremities: distal pulses 2+ x4        LABS:                        11.2   5.82  )-----------( 136      ( 17 Jul 2022 05:30 )             35.0     07-17    138  |  97  |  28<H>  ----------------------------<  91  4.2   |  30  |  0.88    Ca    9.2      17 Jul 2022 05:30  Phos  3.1     07-17  Mg     2.3     07-17          CULTURES:  Culture Results:   No growth at 12 hours (07-16 @ 16:31)  Culture Results:   Growth in aerobic and anaerobic bottles: Staphylococcus lugdunensis  See previous culture [id number] (07-14 @ 05:27)      RADIOLOGY & ADDITIONAL STUDIES:    < from: MR Thoracic Spine No Cont (07.16.22 @ 12:39) >  IMPRESSION: Multilevel degenerative disc disease. Probable large central   disc herniation T9/10 with partially calcified extruded disc material   extending superiorly behind the T9 vertebral body and compressing the   spinal cord. Alternative considerations would include a meningioma. This   could be further evaluated with a contrast study.    < end of copied text >    < from: MR Lumbar Spine No Cont (07.16.22 @ 12:38) >    IMPRESSION: Grade1 retrolisthesis of L1 on L2 and anterolisthesis of L4   on L5. Multilevel degenerative disc disease with spinal stenosis and   neural foramen narrowing as detailed above.    < end of copied text >    Assessment:  86 F with PMH HFpEF (last ECHO 7/9) , HTN, RA, hypothyroidism, morbid obesity, and sacral decubitus ulcer who presented to ED with atraumatic back pain that is worse with movement, non-radiating and unrelieved with NSAIDS. Found to have lumbar spinal stenosis and ventral calcified disc vs. meningioma at T9/T10.     Plan:  - conservative management, no neurosurgical intervention indicated  - please increase PO pain regimen; pain is currently uncontrolled  - PT/OT with premedication     Discussed with Dr. Ramos

## 2022-07-17 NOTE — PROGRESS NOTE ADULT - SUBJECTIVE AND OBJECTIVE BOX
INTERVAL HPI/OVERNIGHT EVENTS: LBP transiently alleviated with analgesia.     CONSTITUTIONAL:  Negative fever or chills, feels well, good appetite  EYES:  Negative  blurry vision or double vision  CARDIOVASCULAR:  Negative for chest pain or palpitations  RESPIRATORY:  Negative for cough, wheezing, or SOB   GASTROINTESTINAL:  Negative for nausea, vomiting, diarrhea, constipation, or abdominal pain  GENITOURINARY:  Negative frequency, urgency or dysuria  NEUROLOGIC:  No headache, confusion, dizziness, lightheadedness      ANTIBIOTICS/RELEVANT:    MEDICATIONS  (STANDING):  acetaminophen     Tablet .. 650 milliGRAM(s) Oral every 6 hours  ascorbic acid 500 milliGRAM(s) Oral daily  ceFAZolin   IVPB      ceFAZolin   IVPB 2000 milliGRAM(s) IV Intermittent every 8 hours  cyclobenzaprine 5 milliGRAM(s) Oral three times a day  enoxaparin Injectable 40 milliGRAM(s) SubCutaneous every 12 hours  HYDROmorphone  Injectable 0.5 milliGRAM(s) IV Push once  levothyroxine 137 MICROGram(s) Oral every 24 hours  lidocaine   4% Patch 1 Patch Transdermal daily  metoprolol succinate ER 12.5 milliGRAM(s) Oral every 24 hours  multivitamin 1 Tablet(s) Oral daily  nystatin Powder 1 Application(s) Topical two times a day  oxyCODONE    IR 5 milliGRAM(s) Oral every 6 hours  zinc sulfate 220 milliGRAM(s) Oral daily    MEDICATIONS  (PRN):        Vital Signs Last 24 Hrs  T(C): 36.9 (17 Jul 2022 05:56), Max: 37.4 (16 Jul 2022 20:48)  T(F): 98.4 (17 Jul 2022 05:56), Max: 99.3 (16 Jul 2022 20:48)  HR: 73 (17 Jul 2022 05:56) (64 - 75)  BP: 126/74 (17 Jul 2022 05:56) (119/55 - 131/60)  BP(mean): --  RR: 18 (17 Jul 2022 05:56) (18 - 18)  SpO2: 96% (17 Jul 2022 05:56) (96% - 96%)    Parameters below as of 17 Jul 2022 05:56  Patient On (Oxygen Delivery Method): nasal cannula  O2 Flow (L/min): 2      PHYSICAL EXAM:  Constitutional: NAD  Eyes: MIKE, EOMI  Ear/Nose/Throat: no oral lesion, no sinus tenderness on percussion	  Neck: no JVD, no lymphadenopathy, supple  Respiratory: CTA fly  Cardiovascular: S1S2 RRR, no murmurs  Gastrointestinal:soft, (+) BS, no HSM  Extremities:no e/e/c  Vascular: DP Pulse:	right normal; left normal      LABS:                        11.2   5.82  )-----------( 136      ( 17 Jul 2022 05:30 )             35.0     07-17    138  |  97  |  28<H>  ----------------------------<  91  4.2   |  30  |  0.88    Ca    9.2      17 Jul 2022 05:30  Phos  3.1     07-17  Mg     2.3     07-17            MICROBIOLOGY: reviewed    RADIOLOGY & ADDITIONAL STUDIES: reviewed

## 2022-07-17 NOTE — PROGRESS NOTE ADULT - NUTRITIONAL ASSESSMENT
This patient has been assessed with a concern for Malnutrition and has been determined to have a diagnosis/diagnoses of Morbid obesity (BMI > 40).    This patient is being managed with:   Diet DASH/TLC-  Sodium & Cholesterol Restricted  Entered: Jul 14 2022  2:41PM

## 2022-07-17 NOTE — PROVIDER CONTACT NOTE (MEDICATION) - REASON
Medication administration
Regular rate and rhythm, Heart sounds S1 S2 present, no murmurs, rubs or gallops

## 2022-07-18 LAB
ALBUMIN SERPL ELPH-MCNC: 3.3 G/DL — SIGNIFICANT CHANGE UP (ref 3.3–5)
ALP SERPL-CCNC: 58 U/L — SIGNIFICANT CHANGE UP (ref 40–120)
ALT FLD-CCNC: 13 U/L — SIGNIFICANT CHANGE UP (ref 10–45)
ANION GAP SERPL CALC-SCNC: 11 MMOL/L — SIGNIFICANT CHANGE UP (ref 5–17)
ANISOCYTOSIS BLD QL: SIGNIFICANT CHANGE UP
AST SERPL-CCNC: 51 U/L — HIGH (ref 10–40)
BASOPHILS # BLD AUTO: 0 K/UL — SIGNIFICANT CHANGE UP (ref 0–0.2)
BASOPHILS NFR BLD AUTO: 0 % — SIGNIFICANT CHANGE UP (ref 0–2)
BILIRUB SERPL-MCNC: 0.5 MG/DL — SIGNIFICANT CHANGE UP (ref 0.2–1.2)
BUN SERPL-MCNC: 23 MG/DL — SIGNIFICANT CHANGE UP (ref 7–23)
CALCIUM SERPL-MCNC: 8.8 MG/DL — SIGNIFICANT CHANGE UP (ref 8.4–10.5)
CHLORIDE SERPL-SCNC: 96 MMOL/L — SIGNIFICANT CHANGE UP (ref 96–108)
CO2 SERPL-SCNC: 31 MMOL/L — SIGNIFICANT CHANGE UP (ref 22–31)
CREAT SERPL-MCNC: 0.79 MG/DL — SIGNIFICANT CHANGE UP (ref 0.5–1.3)
DACRYOCYTES BLD QL SMEAR: SLIGHT — SIGNIFICANT CHANGE UP
EGFR: 74 ML/MIN/1.73M2 — SIGNIFICANT CHANGE UP
EOSINOPHIL # BLD AUTO: 0.08 K/UL — SIGNIFICANT CHANGE UP (ref 0–0.5)
EOSINOPHIL NFR BLD AUTO: 1.7 % — SIGNIFICANT CHANGE UP (ref 0–6)
GIANT PLATELETS BLD QL SMEAR: PRESENT — SIGNIFICANT CHANGE UP
GLUCOSE SERPL-MCNC: 99 MG/DL — SIGNIFICANT CHANGE UP (ref 70–99)
HCT VFR BLD CALC: 32.4 % — LOW (ref 34.5–45)
HGB BLD-MCNC: 10.4 G/DL — LOW (ref 11.5–15.5)
HYPOCHROMIA BLD QL: SLIGHT — SIGNIFICANT CHANGE UP
LYMPHOCYTES # BLD AUTO: 1.02 K/UL — SIGNIFICANT CHANGE UP (ref 1–3.3)
LYMPHOCYTES # BLD AUTO: 20.9 % — SIGNIFICANT CHANGE UP (ref 13–44)
MACROCYTES BLD QL: SIGNIFICANT CHANGE UP
MAGNESIUM SERPL-MCNC: 2.1 MG/DL — SIGNIFICANT CHANGE UP (ref 1.6–2.6)
MANUAL SMEAR VERIFICATION: SIGNIFICANT CHANGE UP
MCHC RBC-ENTMCNC: 31.5 PG — SIGNIFICANT CHANGE UP (ref 27–34)
MCHC RBC-ENTMCNC: 32.1 GM/DL — SIGNIFICANT CHANGE UP (ref 32–36)
MCV RBC AUTO: 98.2 FL — SIGNIFICANT CHANGE UP (ref 80–100)
MICROCYTES BLD QL: SLIGHT — SIGNIFICANT CHANGE UP
MONOCYTES # BLD AUTO: 1.18 K/UL — HIGH (ref 0–0.9)
MONOCYTES NFR BLD AUTO: 24.3 % — HIGH (ref 2–14)
NEUTROPHILS # BLD AUTO: 2.54 K/UL — SIGNIFICANT CHANGE UP (ref 1.8–7.4)
NEUTROPHILS NFR BLD AUTO: 49.6 % — SIGNIFICANT CHANGE UP (ref 43–77)
NEUTS BAND # BLD: 2.6 % — SIGNIFICANT CHANGE UP (ref 0–8)
OVALOCYTES BLD QL SMEAR: SLIGHT — SIGNIFICANT CHANGE UP
PHOSPHATE SERPL-MCNC: 2.3 MG/DL — LOW (ref 2.5–4.5)
PLAT MORPH BLD: ABNORMAL
PLATELET # BLD AUTO: 108 K/UL — LOW (ref 150–400)
POIKILOCYTOSIS BLD QL AUTO: SIGNIFICANT CHANGE UP
POLYCHROMASIA BLD QL SMEAR: SLIGHT — SIGNIFICANT CHANGE UP
POTASSIUM SERPL-MCNC: 4.1 MMOL/L — SIGNIFICANT CHANGE UP (ref 3.5–5.3)
POTASSIUM SERPL-SCNC: 4.1 MMOL/L — SIGNIFICANT CHANGE UP (ref 3.5–5.3)
PROT SERPL-MCNC: 6.7 G/DL — SIGNIFICANT CHANGE UP (ref 6–8.3)
RBC # BLD: 3.3 M/UL — LOW (ref 3.8–5.2)
RBC # FLD: 19.3 % — HIGH (ref 10.3–14.5)
RBC BLD AUTO: ABNORMAL
SCHISTOCYTES BLD QL AUTO: SLIGHT — SIGNIFICANT CHANGE UP
SODIUM SERPL-SCNC: 138 MMOL/L — SIGNIFICANT CHANGE UP (ref 135–145)
SPHEROCYTES BLD QL SMEAR: SLIGHT — SIGNIFICANT CHANGE UP
VARIANT LYMPHS # BLD: 0.9 % — SIGNIFICANT CHANGE UP (ref 0–6)
WBC # BLD: 4.87 K/UL — SIGNIFICANT CHANGE UP (ref 3.8–10.5)
WBC # FLD AUTO: 4.87 K/UL — SIGNIFICANT CHANGE UP (ref 3.8–10.5)

## 2022-07-18 PROCEDURE — 93306 TTE W/DOPPLER COMPLETE: CPT | Mod: 26

## 2022-07-18 PROCEDURE — 99233 SBSQ HOSP IP/OBS HIGH 50: CPT | Mod: GC,25

## 2022-07-18 PROCEDURE — 99232 SBSQ HOSP IP/OBS MODERATE 35: CPT | Mod: 25

## 2022-07-18 RX ORDER — OXYCODONE HYDROCHLORIDE 5 MG/1
5 TABLET ORAL EVERY 4 HOURS
Refills: 0 | Status: DISCONTINUED | OUTPATIENT
Start: 2022-07-18 | End: 2022-07-20

## 2022-07-18 RX ORDER — METHOCARBAMOL 500 MG/1
500 TABLET, FILM COATED ORAL EVERY 8 HOURS
Refills: 0 | Status: DISCONTINUED | OUTPATIENT
Start: 2022-07-18 | End: 2022-07-22

## 2022-07-18 RX ORDER — LOSARTAN POTASSIUM 100 MG/1
25 TABLET, FILM COATED ORAL DAILY
Refills: 0 | Status: DISCONTINUED | OUTPATIENT
Start: 2022-07-18 | End: 2022-07-18

## 2022-07-18 RX ORDER — FUROSEMIDE 40 MG
20 TABLET ORAL
Refills: 0 | Status: DISCONTINUED | OUTPATIENT
Start: 2022-07-18 | End: 2022-07-22

## 2022-07-18 RX ADMIN — LIDOCAINE 1 PATCH: 4 CREAM TOPICAL at 11:28

## 2022-07-18 RX ADMIN — Medication 500 MILLIGRAM(S): at 11:28

## 2022-07-18 RX ADMIN — ENOXAPARIN SODIUM 40 MILLIGRAM(S): 100 INJECTION SUBCUTANEOUS at 07:30

## 2022-07-18 RX ADMIN — Medication 650 MILLIGRAM(S): at 15:34

## 2022-07-18 RX ADMIN — OXYCODONE HYDROCHLORIDE 5 MILLIGRAM(S): 5 TABLET ORAL at 21:53

## 2022-07-18 RX ADMIN — Medication 650 MILLIGRAM(S): at 07:30

## 2022-07-18 RX ADMIN — Medication 650 MILLIGRAM(S): at 19:04

## 2022-07-18 RX ADMIN — LIDOCAINE 1 PATCH: 4 CREAM TOPICAL at 21:08

## 2022-07-18 RX ADMIN — Medication 50 MILLIGRAM(S): at 07:31

## 2022-07-18 RX ADMIN — Medication 100 MILLIGRAM(S): at 18:04

## 2022-07-18 RX ADMIN — Medication 650 MILLIGRAM(S): at 02:24

## 2022-07-18 RX ADMIN — OXYCODONE HYDROCHLORIDE 5 MILLIGRAM(S): 5 TABLET ORAL at 15:34

## 2022-07-18 RX ADMIN — OXYCODONE HYDROCHLORIDE 5 MILLIGRAM(S): 5 TABLET ORAL at 19:04

## 2022-07-18 RX ADMIN — Medication 50 MILLIGRAM(S): at 21:53

## 2022-07-18 RX ADMIN — CYCLOBENZAPRINE HYDROCHLORIDE 5 MILLIGRAM(S): 10 TABLET, FILM COATED ORAL at 01:26

## 2022-07-18 RX ADMIN — LIDOCAINE 1 PATCH: 4 CREAM TOPICAL at 23:12

## 2022-07-18 RX ADMIN — OXYCODONE HYDROCHLORIDE 5 MILLIGRAM(S): 5 TABLET ORAL at 14:34

## 2022-07-18 RX ADMIN — OXYCODONE HYDROCHLORIDE 5 MILLIGRAM(S): 5 TABLET ORAL at 10:26

## 2022-07-18 RX ADMIN — ENOXAPARIN SODIUM 40 MILLIGRAM(S): 100 INJECTION SUBCUTANEOUS at 18:05

## 2022-07-18 RX ADMIN — Medication 20 MILLIGRAM(S): at 10:26

## 2022-07-18 RX ADMIN — OXYCODONE HYDROCHLORIDE 5 MILLIGRAM(S): 5 TABLET ORAL at 18:04

## 2022-07-18 RX ADMIN — METHOCARBAMOL 500 MILLIGRAM(S): 500 TABLET, FILM COATED ORAL at 18:04

## 2022-07-18 RX ADMIN — Medication 12.5 MILLIGRAM(S): at 18:04

## 2022-07-18 RX ADMIN — ZINC SULFATE TAB 220 MG (50 MG ZINC EQUIVALENT) 220 MILLIGRAM(S): 220 (50 ZN) TAB at 11:28

## 2022-07-18 RX ADMIN — OXYCODONE HYDROCHLORIDE 5 MILLIGRAM(S): 5 TABLET ORAL at 02:07

## 2022-07-18 RX ADMIN — Medication 1 TABLET(S): at 11:28

## 2022-07-18 RX ADMIN — Medication 85 MILLIMOLE(S): at 14:34

## 2022-07-18 RX ADMIN — METHOCARBAMOL 500 MILLIGRAM(S): 500 TABLET, FILM COATED ORAL at 10:26

## 2022-07-18 RX ADMIN — Medication 50 MILLIGRAM(S): at 14:34

## 2022-07-18 RX ADMIN — NYSTATIN CREAM 1 APPLICATION(S): 100000 CREAM TOPICAL at 07:31

## 2022-07-18 RX ADMIN — OXYCODONE HYDROCHLORIDE 5 MILLIGRAM(S): 5 TABLET ORAL at 23:53

## 2022-07-18 RX ADMIN — Medication 650 MILLIGRAM(S): at 01:26

## 2022-07-18 RX ADMIN — Medication 137 MICROGRAM(S): at 07:31

## 2022-07-18 RX ADMIN — Medication 100 MILLIGRAM(S): at 10:26

## 2022-07-18 RX ADMIN — OXYCODONE HYDROCHLORIDE 5 MILLIGRAM(S): 5 TABLET ORAL at 03:00

## 2022-07-18 RX ADMIN — Medication 650 MILLIGRAM(S): at 18:04

## 2022-07-18 RX ADMIN — NYSTATIN CREAM 1 APPLICATION(S): 100000 CREAM TOPICAL at 18:05

## 2022-07-18 RX ADMIN — Medication 650 MILLIGRAM(S): at 14:34

## 2022-07-18 RX ADMIN — OXYCODONE HYDROCHLORIDE 5 MILLIGRAM(S): 5 TABLET ORAL at 11:26

## 2022-07-18 RX ADMIN — Medication 100 MILLIGRAM(S): at 01:26

## 2022-07-18 NOTE — PROGRESS NOTE ADULT - PROBLEM SELECTOR PLAN 7
Hgb: 10.0 with RDW 20.5, ferritin 793  Consistent with anemia of chronic disease with unknown source  - monitor CBC daily Patient on Lasix 20 mg PO M/W/F and losartan 25 mg qd; Toprol 12.5 qd at home  - recent admission to [facility] due to HF exacerbation   - c/w home meds toprol  - hold lasix and losartan in the setting of HERNESTO   - DASH/TLC diet  - f/u outpatient HF team Patient on Lasix 20 mg PO M/W/F and losartan 25 mg qd; Toprol 12.5 qd at home  - recent admission to [facility] due to HF exacerbation   - c/w home meds toprol  - Restarted lasix and continued to hold losartan in the setting of resolved HERNESTO and bacteremia  - DASH/TLC diet  - f/u outpatient HF team

## 2022-07-18 NOTE — PROGRESS NOTE ADULT - SUBJECTIVE AND OBJECTIVE BOX
INTERVAL HPI/OVERNIGHT EVENTS: ZEN    CONSTITUTIONAL:  Negative fever or chills, feels well, good appetite  EYES:  Negative  blurry vision or double vision  CARDIOVASCULAR:  Negative for chest pain or palpitations  RESPIRATORY:  Negative for cough, wheezing, or SOB   GASTROINTESTINAL:  Negative for nausea, vomiting, diarrhea, constipation, or abdominal pain  GENITOURINARY:  Negative frequency, urgency or dysuria  NEUROLOGIC:  No headache, confusion, dizziness, lightheadedness      ANTIBIOTICS/RELEVANT:    MEDICATIONS  (STANDING):  acetaminophen     Tablet .. 650 milliGRAM(s) Oral every 6 hours  ascorbic acid 500 milliGRAM(s) Oral daily  ceFAZolin   IVPB 2000 milliGRAM(s) IV Intermittent every 8 hours  ceFAZolin   IVPB      enoxaparin Injectable 40 milliGRAM(s) SubCutaneous every 12 hours  furosemide    Tablet 20 milliGRAM(s) Oral <User Schedule>  levothyroxine 137 MICROGram(s) Oral every 24 hours  lidocaine   4% Patch 1 Patch Transdermal daily  methocarbamol 500 milliGRAM(s) Oral every 8 hours  metoprolol succinate ER 12.5 milliGRAM(s) Oral every 24 hours  multivitamin 1 Tablet(s) Oral daily  nystatin Powder 1 Application(s) Topical two times a day  oxyCODONE    IR 5 milliGRAM(s) Oral every 4 hours  pregabalin 50 milliGRAM(s) Oral three times a day  zinc sulfate 220 milliGRAM(s) Oral daily    MEDICATIONS  (PRN):        Vital Signs Last 24 Hrs  T(C): 36.9 (18 Jul 2022 11:36), Max: 37.2 (17 Jul 2022 21:08)  T(F): 98.5 (18 Jul 2022 11:36), Max: 99 (17 Jul 2022 21:08)  HR: 65 (18 Jul 2022 11:36) (64 - 85)  BP: 126/77 (18 Jul 2022 11:36) (118/72 - 151/64)  BP(mean): --  RR: 19 (18 Jul 2022 11:36) (18 - 19)  SpO2: 96% (18 Jul 2022 11:36) (96% - 96%)    Parameters below as of 18 Jul 2022 11:36  Patient On (Oxygen Delivery Method): nasal cannula  O2 Flow (L/min): 2      PHYSICAL EXAM:  Constitutional: NAD  Eyes: MIKE, EOMI  Ear/Nose/Throat: no oral lesion, no sinus tenderness on percussion	  Neck: no JVD, no lymphadenopathy, supple  Respiratory: CTA fly  Cardiovascular: S1S2 RRR, no murmurs  Gastrointestinal:soft, (+) BS, no HSM  Extremities:no e/e/c  Vascular: DP Pulse:	right normal; left normal      LABS:                        10.4   4.87  )-----------( 108      ( 18 Jul 2022 05:30 )             32.4     07-18    138  |  96  |  23  ----------------------------<  99  4.1   |  31  |  0.79    Ca    8.8      18 Jul 2022 05:30  Phos  2.3     07-18  Mg     2.1     07-18    TPro  6.7  /  Alb  3.3  /  TBili  0.5  /  DBili  x   /  AST  51<H>  /  ALT  13  /  AlkPhos  58  07-18          MICROBIOLOGY: reviewed    RADIOLOGY & ADDITIONAL STUDIES: reviewed < from: TTE Echo Complete w/ Contrast w/o Doppler (07.18.22 @ 13:12) >  CONCLUSIONS:     1. Mild symmetric left ventricular hypertrophy.   2. Borderline normal left ventricular size and systolic function.   3. Normal right ventricular size and systolic function.   4. Grade I left ventricular diastolic dysfunction.   5. Mildly dilated left atrium.   6. No significant valvular disease.   7. No evidence of pulmonary hypertension.   8. No pericardial effusion.    < end of copied text >

## 2022-07-18 NOTE — PROGRESS NOTE ADULT - PROBLEM SELECTOR PLAN 4
Pt with known pressure ulcer on sacrum   - stage 2   - a/w wound care recs Pt with known pressure ulcer on sacrum   - stage 2   - c/w wound care recs

## 2022-07-18 NOTE — PROGRESS NOTE ADULT - SUBJECTIVE AND OBJECTIVE BOX
INTERVAL HPI/OVERNIGHT EVENTS:  Patient was seen and examined at bedside. As per patient, still having severe sharp stabbing lower back pain. The pain improves at rest.  More comfortable compared to yesterday. Patient denies: fever, chills, CP, SOB, N/V/D.    VITAL SIGNS:  T(F): 98.2 (07-18-22 @ 06:09)  HR: 64 (07-18-22 @ 06:09)  BP: 144/66 (07-18-22 @ 06:09)  RR: 19 (07-18-22 @ 06:09)  SpO2: 96% (07-18-22 @ 06:09)  Wt(kg): --    PHYSICAL EXAM:    Constitutional: Patient is awake and alert; Morbidly obese; Appears to be in extreme pain with any movement  HEENT: EOMI, CN II-XII intact, sclera non-icteric  Respiratory: CTA b/l, no wheezing, no rhonchi, no rales  Cardiovascular: normal S1S2; +3/6 systolic murmur loudest at the right upper sternal border that radiates to the carotids  Gastrointestinal: soft, NTND, no masses palpable; erythematous scaly patches along abdominal folds concerning for intertrigo; bruising noted bilaterally in lower abdominal quadrants  Extremities: Warm, well perfused; radial and posterior tibial pulses present bilaterally; no edema; small 1cm crusted over lesion noted on L hip, intertrigo in popliteal fossa bilaterally; pain upon palpation of lower extremities bilaterally   Patient was in extreme pain with any movement so not able to visualize back or sacral ulcer    MEDICATIONS  (STANDING):  acetaminophen     Tablet .. 650 milliGRAM(s) Oral every 6 hours  ascorbic acid 500 milliGRAM(s) Oral daily  ceFAZolin   IVPB 2000 milliGRAM(s) IV Intermittent every 8 hours  ceFAZolin   IVPB      cyclobenzaprine 5 milliGRAM(s) Oral three times a day  enoxaparin Injectable 40 milliGRAM(s) SubCutaneous every 12 hours  HYDROmorphone  Injectable 0.5 milliGRAM(s) IV Push once  levothyroxine 137 MICROGram(s) Oral every 24 hours  lidocaine   4% Patch 1 Patch Transdermal daily  metoprolol succinate ER 12.5 milliGRAM(s) Oral every 24 hours  multivitamin 1 Tablet(s) Oral daily  nystatin Powder 1 Application(s) Topical two times a day  oxyCODONE    IR 5 milliGRAM(s) Oral every 6 hours  pregabalin 50 milliGRAM(s) Oral three times a day  zinc sulfate 220 milliGRAM(s) Oral daily    Allergies    Levaquin (Hives)    Intolerances    LABS:                        11.2   5.82  )-----------( 136      ( 17 Jul 2022 05:30 )             35.0     07-17    138  |  97  |  28<H>  ----------------------------<  91  4.2   |  30  |  0.88    Ca    9.2      17 Jul 2022 05:30  Phos  3.1     07-17  Mg     2.3     07-17    BNP 4812    Anaerobic culture bottle grew Staph lugdunensis - switched to cefazolin 2g q8h as per ID recs    RADIOLOGY & ADDITIONAL TESTS:  Reviewed     INTERVAL HPI/OVERNIGHT EVENTS:  Patient was seen and examined at bedside. As per patient, still having severe sharp stabbing lower back pain. The pain improves at rest.  More comfortable compared to yesterday. Patient denies: fever, chills, CP, SOB, N/V/D.    VITAL SIGNS:  T(F): 98.2 (07-18-22 @ 06:09)  HR: 64 (07-18-22 @ 06:09)  BP: 144/66 (07-18-22 @ 06:09)  RR: 19 (07-18-22 @ 06:09)  SpO2: 96% (07-18-22 @ 06:09)  Wt(kg): --    PHYSICAL EXAM:    Constitutional: Patient is awake and alert; Morbidly obese; Appears to be in extreme pain with any movement  HEENT: EOMI, CN II-XII intact, sclera non-icteric  Respiratory: CTA b/l, no wheezing, no rhonchi, no rales  Cardiovascular: normal S1S2; +3/6 systolic murmur loudest at the right upper sternal border that radiates to the carotids  Gastrointestinal: soft, NTND, no masses palpable; erythematous scaly patches along abdominal folds concerning for intertrigo; bruising noted bilaterally in lower abdominal quadrants  Extremities: Warm, well perfused; radial and posterior tibial pulses present bilaterally; no edema; small 1cm crusted over lesion noted on L hip, intertrigo in popliteal fossa bilaterally; pain upon palpation of lower extremities bilaterally   Patient was in extreme pain with any movement so not able to visualize back or sacral ulcer    MEDICATIONS  (STANDING):  acetaminophen     Tablet .. 650 milliGRAM(s) Oral every 6 hours  ascorbic acid 500 milliGRAM(s) Oral daily  ceFAZolin   IVPB 2000 milliGRAM(s) IV Intermittent every 8 hours (D3)  cyclobenzaprine 5 milliGRAM(s) Oral three times a day  enoxaparin Injectable 40 milliGRAM(s) SubCutaneous every 12 hours  HYDROmorphone  Injectable 0.5 milliGRAM(s) IV Push once  levothyroxine 137 MICROGram(s) Oral every 24 hours  lidocaine   4% Patch 1 Patch Transdermal daily  metoprolol succinate ER 12.5 milliGRAM(s) Oral every 24 hours  multivitamin 1 Tablet(s) Oral daily  nystatin Powder 1 Application(s) Topical two times a day  oxyCODONE    IR 5 milliGRAM(s) Oral every 6 hours  pregabalin 50 milliGRAM(s) Oral three times a day  zinc sulfate 220 milliGRAM(s) Oral daily    Allergies    Levaquin (Hives)    Intolerances    LABS:                        11.2   5.82  )-----------( 136      ( 17 Jul 2022 05:30 )             35.0     07-17    138  |  97  |  28<H>  ----------------------------<  91  4.2   |  30  |  0.88    Ca    9.2      17 Jul 2022 05:30  Phos  3.1     07-17  Mg     2.3     07-17    BNP 4812    Anaerobic culture bottle grew Staph lugdunensis - switched to cefazolin 2g q8h as per ID recs    RADIOLOGY & ADDITIONAL TESTS:  Multilevel degenerative disc disease. Probable large central disc herniation T9/10 with partially calcified extruded disc material extending superiorly behind the T9 vertebral body and compressing the spinal cord. Alternative considerations would include a meningioma. This could be further evaluated with a contrast study.    Grade 1 retrolisthesis of L1 on L2 and anterolisthesis of L4 on L5. Multilevel degenerative disc disease with spinal stenosis and neural foramen narrowing as detailed above.       INTERVAL HPI/OVERNIGHT EVENTS:  Patient was seen and examined at bedside. As per patient, continues to have severe sharp stabbing lower back pain with movement (severity 10/10). Pain 1/10 severity at rest. More comfortable compared to yesterday. Patient denies: fever, chills, CP, SOB, N/V/D.    VITAL SIGNS:  T(F): 98.2 (07-18-22 @ 06:09)  HR: 64 (07-18-22 @ 06:09)  BP: 144/66 (07-18-22 @ 06:09)  RR: 19 (07-18-22 @ 06:09)  SpO2: 96% (07-18-22 @ 06:09)  Wt(kg): --    PHYSICAL EXAM:    Constitutional: Patient is awake and alert; Morbidly obese; Appears to be in extreme pain with any movement  HEENT: EOMI, CN II-XII intact, sclera non-icteric  Respiratory: CTA b/l, no wheezing, no rhonchi, no rales  Cardiovascular: normal S1S2; +3/6 systolic murmur loudest at the right upper sternal border that radiates to the carotids  Gastrointestinal: soft, NTND, no masses palpable; erythematous scaly patches along abdominal folds concerning for intertrigo; bruising noted bilaterally in lower abdominal quadrants  Extremities: Warm, well perfused; radial and posterior tibial pulses present bilaterally; no edema; small 1cm crusted over lesion noted on L hip, intertrigo in popliteal fossa bilaterally; pain upon palpation of lower extremities bilaterally   Patient was in extreme pain with any movement so not able to visualize back or sacral ulcer    MEDICATIONS  (STANDING):  acetaminophen     Tablet .. 650 milliGRAM(s) Oral every 6 hours  ascorbic acid 500 milliGRAM(s) Oral daily  ceFAZolin   IVPB 2000 milliGRAM(s) IV Intermittent every 8 hours (D3)  cyclobenzaprine 5 milliGRAM(s) Oral three times a day  enoxaparin Injectable 40 milliGRAM(s) SubCutaneous every 12 hours  HYDROmorphone  Injectable 0.5 milliGRAM(s) IV Push once  levothyroxine 137 MICROGram(s) Oral every 24 hours  lidocaine   4% Patch 1 Patch Transdermal daily  metoprolol succinate ER 12.5 milliGRAM(s) Oral every 24 hours  multivitamin 1 Tablet(s) Oral daily  nystatin Powder 1 Application(s) Topical two times a day  oxyCODONE    IR 5 milliGRAM(s) Oral every 6 hours  pregabalin 50 milliGRAM(s) Oral three times a day  zinc sulfate 220 milliGRAM(s) Oral daily    Allergies    Levaquin (Hives)    Intolerances    LABS:                        11.2   5.82  )-----------( 136      ( 17 Jul 2022 05:30 )             35.0     07-17    138  |  97  |  28<H>  ----------------------------<  91  4.2   |  30  |  0.88    Ca    9.2      17 Jul 2022 05:30  Phos  3.1     07-17  Mg     2.3     07-17    BNP 4812    Anaerobic culture bottle grew Staph lugdunensis - switched to cefazolin 2g q8h as per ID recs    RADIOLOGY & ADDITIONAL TESTS:  Multilevel degenerative disc disease. Probable large central disc herniation T9/10 with partially calcified extruded disc material extending superiorly behind the T9 vertebral body and compressing the spinal cord. Alternative considerations would include a meningioma. This could be further evaluated with a contrast study.    Grade 1 retrolisthesis of L1 on L2 and anterolisthesis of L4 on L5. Multilevel degenerative disc disease with spinal stenosis and neural foramen narrowing as detailed above.       INTERVAL HPI/OVERNIGHT EVENTS:  Patient was seen and examined at bedside. As per patient, continues to have severe sharp stabbing lower back pain with movement (severity 10/10). Pain 1/10 severity at rest. More comfortable compared to yesterday. Patient denies: urinary or bowel incontinence, loss of sensation, paresthesias, headaches, nausea, vomiting, night sweats. Patient admits to intermittent chills. She's having fluids but no solid food.     VITAL SIGNS:  T(F): 98.2 (07-18-22 @ 06:09)  HR: 64 (07-18-22 @ 06:09)  BP: 144/66 (07-18-22 @ 06:09)  RR: 19 (07-18-22 @ 06:09)  SpO2: 96% (07-18-22 @ 06:09)  Wt(kg): --    PHYSICAL EXAM:    Constitutional: Patient is awake and alert; Morbidly obese; Appears to be resting comfortable when not moving.  HEENT: EOMI, CN II-XII intact, sclera non-icteric  Respiratory: CTA b/l, no wheezing, no rhonchi, no rales  Cardiovascular: normal S1S2; +3/6 systolic murmur loudest at the right upper sternal border that radiates to the carotids  Gastrointestinal: soft, NTND, no masses palpable; erythematous scaly patches along abdominal folds concerning for intertrigo covered in nystatin powder; bruising noted bilaterally in lower abdominal quadrants  Extremities: Warm, well perfused; radial and posterior tibial pulses present bilaterally; has slight pitting edema b/l; bandage covering lesion on L hip; intertrigo in popliteal fossa bilaterally; LE range of motion limited by pain; no dermatomal sensation deficits noted in UEs or LEs; UE reflexes   Patient was in extreme pain with any movement so not able to visualize back or sacral ulcer    MEDICATIONS  (STANDING):  acetaminophen     Tablet .. 650 milliGRAM(s) Oral every 6 hours  ascorbic acid 500 milliGRAM(s) Oral daily  ceFAZolin   IVPB 2000 milliGRAM(s) IV Intermittent every 8 hours (D3)  cyclobenzaprine 5 milliGRAM(s) Oral three times a day  enoxaparin Injectable 40 milliGRAM(s) SubCutaneous every 12 hours  HYDROmorphone  Injectable 0.5 milliGRAM(s) IV Push once  levothyroxine 137 MICROGram(s) Oral every 24 hours  lidocaine   4% Patch 1 Patch Transdermal daily  metoprolol succinate ER 12.5 milliGRAM(s) Oral every 24 hours  multivitamin 1 Tablet(s) Oral daily  nystatin Powder 1 Application(s) Topical two times a day  oxyCODONE    IR 5 milliGRAM(s) Oral every 6 hours  pregabalin 50 milliGRAM(s) Oral three times a day  zinc sulfate 220 milliGRAM(s) Oral daily    Allergies    Levaquin (Hives)    Intolerances    LABS:                        11.2   5.82  )-----------( 136      ( 17 Jul 2022 05:30 )             35.0     07-17    138  |  97  |  28<H>  ----------------------------<  91  4.2   |  30  |  0.88    Ca    9.2      17 Jul 2022 05:30  Phos  3.1     07-17  Mg     2.3     07-17    BNP 4812    Anaerobic culture bottle grew Staph lugdunensis - switched to cefazolin 2g q8h as per ID recs    RADIOLOGY & ADDITIONAL TESTS:  Multilevel degenerative disc disease. Probable large central disc herniation T9/10 with partially calcified extruded disc material extending superiorly behind the T9 vertebral body and compressing the spinal cord. Alternative considerations would include a meningioma. This could be further evaluated with a contrast study.    Grade 1 retrolisthesis of L1 on L2 and anterolisthesis of L4 on L5. Multilevel degenerative disc disease with spinal stenosis and neural foramen narrowing as detailed above.       INTERVAL HPI/OVERNIGHT EVENTS:  Patient was seen and examined at bedside. As per patient, continues to have severe sharp stabbing lower back pain with movement (severity 10/10). Pain 1/10 severity at rest. More comfortable compared to yesterday. Patient denies: urinary or bowel incontinence, loss of sensation, paresthesias, headaches, nausea, vomiting, night sweats. Patient admits to intermittent chills. She's having fluids but no solid food.     VITAL SIGNS:  T(F): 98.2 (07-18-22 @ 06:09)  HR: 64 (07-18-22 @ 06:09)  BP: 144/66 (07-18-22 @ 06:09)  RR: 19 (07-18-22 @ 06:09)  SpO2: 96% (07-18-22 @ 06:09)  Wt(kg): --    PHYSICAL EXAM:    Constitutional: Patient is awake and alert; Morbidly obese; Appears to be resting comfortable when not moving.  HEENT: EOMI, CN II-XII intact, sclera non-icteric  Respiratory: CTA b/l, no wheezing, no rhonchi, no rales  Cardiovascular: normal S1S2; +3/6 systolic murmur loudest at the right upper sternal border that radiates to the carotids  Gastrointestinal: soft, NTND, no masses palpable; erythematous scaly patches along abdominal folds concerning for intertrigo covered in nystatin powder; bruising noted bilaterally in lower abdominal quadrants  Extremities: Warm, well perfused; radial and posterior tibial pulses present bilaterally; has slight pitting edema b/l; bandage covering lesion on L hip; intertrigo in popliteal fossa bilaterally; LE range of motion limited by pain; no dermatomal sensation deficits noted in UEs or LEs; 2+ UE reflexes, Lower extremity reflexes hard to ascertain due to patient position; slightly decreased light touch sensation in toe phalanges, proprioception detection intact b/l  Patient in extreme pain with movement so not able to visualize back or sacral ulcer    MEDICATIONS  (STANDING):  acetaminophen     Tablet .. 650 milliGRAM(s) Oral every 6 hours  ascorbic acid 500 milliGRAM(s) Oral daily  ceFAZolin   IVPB 2000 milliGRAM(s) IV Intermittent every 8 hours (D3)  cyclobenzaprine 5 milliGRAM(s) Oral three times a day  enoxaparin Injectable 40 milliGRAM(s) SubCutaneous every 12 hours  HYDROmorphone  Injectable 0.5 milliGRAM(s) IV Push once  levothyroxine 137 MICROGram(s) Oral every 24 hours  lidocaine   4% Patch 1 Patch Transdermal daily  metoprolol succinate ER 12.5 milliGRAM(s) Oral every 24 hours  multivitamin 1 Tablet(s) Oral daily  nystatin Powder 1 Application(s) Topical two times a day  oxyCODONE    IR 5 milliGRAM(s) Oral every 6 hours  pregabalin 50 milliGRAM(s) Oral three times a day  zinc sulfate 220 milliGRAM(s) Oral daily    Allergies    Levaquin (Hives)    Intolerances    LABS:                        11.2   5.82  )-----------( 136      ( 17 Jul 2022 05:30 )             35.0     07-17    138  |  97  |  28<H>  ----------------------------<  91  4.2   |  30  |  0.88    Ca    9.2      17 Jul 2022 05:30  Phos  3.1     07-17  Mg     2.3     07-17    BNP 4812    Anaerobic culture bottle grew Staph lugdunensis - switched to cefazolin 2g q8h as per ID recs    RADIOLOGY & ADDITIONAL TESTS:  Multilevel degenerative disc disease. Probable large central disc herniation T9/10 with partially calcified extruded disc material extending superiorly behind the T9 vertebral body and compressing the spinal cord. Alternative considerations would include a meningioma. This could be further evaluated with a contrast study.    Grade 1 retrolisthesis of L1 on L2 and anterolisthesis of L4 on L5. Multilevel degenerative disc disease with spinal stenosis and neural foramen narrowing as detailed above.       INTERVAL HPI/OVERNIGHT EVENTS:  Patient was seen and examined at bedside. As per patient, continues to have severe sharp stabbing lower back pain with movement (severity 10/10). Pain 1/10 severity at rest. More comfortable compared to yesterday. Patient denies: urinary or bowel incontinence, loss of sensation, paresthesias, headaches, nausea, vomiting, night sweats. Patient admits to intermittent chills. She's having fluids but no solid food.     VITAL SIGNS:  T(F): 98.2 (07-18-22 @ 06:09)  HR: 64 (07-18-22 @ 06:09)  BP: 144/66 (07-18-22 @ 06:09)  RR: 19 (07-18-22 @ 06:09)  SpO2: 96% (07-18-22 @ 06:09)  Wt(kg): --    PHYSICAL EXAM:    Constitutional: Patient is awake and alert; Morbidly obese; Appears to be resting comfortable when not moving.  HEENT: EOMI, CN II-XII intact, sclera non-icteric  Respiratory: CTA b/l, no wheezing, no rhonchi, no rales  Cardiovascular: normal S1S2; +3/6 systolic murmur loudest at the right upper sternal border that radiates to the carotids  Gastrointestinal: soft, NTND, no masses palpable; erythematous scaly patches along abdominal folds concerning for intertrigo covered in nystatin powder; bruising noted bilaterally in lower abdominal quadrants  Extremities: Warm, well perfused; radial and posterior tibial pulses present bilaterally; has slight pitting edema b/l; bandage covering lesion on L hip; intertrigo in popliteal fossa bilaterally; LE range of motion limited by pain b/l; no dermatomal sensation deficits noted in UEs or LEs; 2+ UE reflexes, Lower extremity reflexes hard to ascertain due to patient position; slightly decreased light touch sensation in toe phalanges, proprioception detection intact b/l  Patient in extreme pain with movement so not able to visualize back or sacral ulcer    MEDICATIONS  (STANDING):  acetaminophen     Tablet .. 650 milliGRAM(s) Oral every 6 hours  ascorbic acid 500 milliGRAM(s) Oral daily  ceFAZolin   IVPB 2000 milliGRAM(s) IV Intermittent every 8 hours (D3)  cyclobenzaprine 5 milliGRAM(s) Oral three times a day  enoxaparin Injectable 40 milliGRAM(s) SubCutaneous every 12 hours  HYDROmorphone  Injectable 0.5 milliGRAM(s) IV Push once  levothyroxine 137 MICROGram(s) Oral every 24 hours  lidocaine   4% Patch 1 Patch Transdermal daily  metoprolol succinate ER 12.5 milliGRAM(s) Oral every 24 hours  multivitamin 1 Tablet(s) Oral daily  nystatin Powder 1 Application(s) Topical two times a day  oxyCODONE    IR 5 milliGRAM(s) Oral every 6 hours  pregabalin 50 milliGRAM(s) Oral three times a day  zinc sulfate 220 milliGRAM(s) Oral daily    Allergies    Levaquin (Hives)    Intolerances    LABS:                        11.2   5.82  )-----------( 136      ( 17 Jul 2022 05:30 )             35.0     07-17    138  |  97  |  28<H>  ----------------------------<  91  4.2   |  30  |  0.88    Ca    9.2      17 Jul 2022 05:30  Phos  3.1     07-17  Mg     2.3     07-17    BNP 4812    Anaerobic culture bottle grew Staph lugdunensis - switched to cefazolin 2g q8h as per ID recs    RADIOLOGY & ADDITIONAL TESTS:  Multilevel degenerative disc disease. Probable large central disc herniation T9/10 with partially calcified extruded disc material extending superiorly behind the T9 vertebral body and compressing the spinal cord. Alternative considerations would include a meningioma. This could be further evaluated with a contrast study.    Grade 1 retrolisthesis of L1 on L2 and anterolisthesis of L4 on L5. Multilevel degenerative disc disease with spinal stenosis and neural foramen narrowing as detailed above.

## 2022-07-18 NOTE — PROGRESS NOTE ADULT - PROBLEM SELECTOR PLAN 8
Patient on Lasix 20 mg PO M/W/F and losartan 25 mg qd; Toprol 12.5 qd at home  - recent admission to [facility] due to HF exacerbation   - c/w home meds toprol  - hold lasix and losartan in the setting of HERNESTO   - DASH/TLC diet  - f/u outpatient HF team Hgb: 10.0 with RDW 20.5, ferritin 793  Consistent with anemia of chronic disease with unknown source  - monitor CBC daily Hgb: 10.0 with RDW 20.5, ferritin 793  Consistent with anemia of chronic disease with unknown source (possibly HF, obesity)  - monitor CBC daily

## 2022-07-18 NOTE — PROGRESS NOTE ADULT - PROBLEM SELECTOR PLAN 3
Resolving. Patient recently admitted for CHF. Last dose of lasix: 7/13. Since discharge, significantly decreased fluid intake. In ED: BUN: 60; Cr: 2.03  - most likely prerenal 2/2 to reduced PO intake and in the setting of lasix use   - unremarkable UA   - encouraged increased PO fluid intake   - nephrotoxic medications held (losartan, lasix) Resolving. Patient recently admitted for CHF. Last dose of lasix: 7/13. Since discharge, significantly decreased fluid intake. In ED: BUN: 60; Cr: 2.03  - Resolved with BUN 23; Cr 0.79  - most likely prerenal 2/2 to reduced PO intake and in the setting of lasix use   - unremarkable UA   - encouraged increased PO fluid intake   - Will restart lasix but continuing to hold losartan Resolved. Patient recently admitted for CHF. Last dose of lasix: 7/13. Since discharge, significantly decreased fluid intake. In ED: BUN: 60; Cr: 2.03  - Resolved with BUN 23; Cr 0.79  - most likely prerenal 2/2 to reduced PO intake and in the setting of lasix use   - unremarkable UA   - encouraged increased PO fluid intake   - Will restart lasix but continuing to hold losartan

## 2022-07-18 NOTE — PROGRESS NOTE ADULT - PROBLEM SELECTOR PLAN 2
Pt reports acute back pain after quick movement   - on CT: Advanced lumbar spondylosis, most severe at L4-L5 with grade 1 anterolisthesis contributing to spinal stenosis; more moderate spinal stenosis at L1-2 from posterior osteophyte ridge; see additional  detail above. Also, spinal stenosis at T9-10 with ventral calcified focus that may be calcified disc extrusion or possible meningioma  - lidocaine patch daily, cyclobenzaprine 5 mg 3x daily, Tylenol 650 mg q6h, oxycodone 5 mg q6h  - f/u MRI of lumbar spine  - follow up neurosurgery recs (consulted for severe stenosis and possible meningioma) admission bloodwork showed no leukocytosis (WBC: 6.43) with bandemia 11%. Patient was afebrile, stable vitals with no sx of active infection.   - Bcx positive for Staph lungdunensis  - Started on 1500 mg vancomycin and transitioned to IV cefazolin (D2) as per IV recs  - UA negative for UTI, RVP negative; no focal consolidations on CXR from a few days ago (past admission)  - continue to trend CBC w diff  - a/w ECHO to rule out endocarditis in the setting of bacteremia admission bloodwork showed no leukocytosis (WBC: 6.43) with bandemia 11%. Patient was afebrile, stable vitals with no sx of active infection.   - Bcx positive for Staph lungdunensis (7/14)  - Bcx from 7/16 and 7/17 showed no growth   - ID recs: Gallium scan because of unclear source  - Continuing IV cefazolin (D2) as per ID recs (transitioned from vanc)  - UA negative for UTI, RVP negative; no focal consolidations on CXR from a few days ago (past admission)  - continue to trend CBC w diff  - f/u ECHO to rule out endocarditis in the setting of bacteremia

## 2022-07-18 NOTE — PROGRESS NOTE ADULT - PROBLEM SELECTOR PLAN 1
admission bloodwork showed no leukocytosis (WBC: 6.43) with bandemia 11%. Patient was afebrile, stable vitals with no sx of active infection.   - Bcx positive for Staph lungdunensis  - Started on 1500 mg vancomycin and transitioned to IV cefazolin (D2) as per IV recs  - UA negative for UTI, RVP negative; no focal consolidations on CXR from a few days ago (past admission)  - continue to trend CBC w diff  - a/w ECHO to rule out endocarditis in the setting of bacteremia Pt reports acute back pain after quick movement   - on CT: Advanced lumbar spondylosis, most severe at L4-L5 with grade 1 anterolisthesis contributing to spinal stenosis; more moderate spinal stenosis at L1-2 from posterior osteophyte ridge; see additional  detail above. Also, spinal stenosis at T9-10 with ventral calcified focus that may be calcified disc extrusion or possible meningioma  - lidocaine patch daily, cyclobenzaprine 5 mg 3x daily, Tylenol 650 mg q6h, oxycodone 5 mg q6h  - f/u MRI of lumbar spine  - follow up neurosurgery recs (consulted for severe stenosis and possible meningioma) Pt reports acute back pain after quick movement   - on CT: Advanced lumbar spondylosis, most severe at L4-L5 with grade 1 anterolisthesis contributing to spinal stenosis; more moderate spinal stenosis at L1-2 from posterior osteophyte ridge; Also, spinal stenosis at T9-10 with ventral calcified focus that may be calcified disc extrusion or possible meningioma  - MRI:   - lidocaine patch daily, cyclobenzaprine 5 mg 3x daily, Tylenol 650 mg q6h, oxycodone 5 mg q6h  - f/u MRI of lumbar spine  - follow up neurosurgery recs (consulted for severe stenosis and possible meningioma) Presented with Acute back pain after quick movement   - CT followed by MRI of Lumbar and Thoracic spine with no contrast: showed Multilevel degenerative disc disease. Probable large central disc herniation T9/10 with partially calcified extruded disc material extending superiorly behind the T9 vertebral body and compressing the spinal cord. Alternative considerations would include a meningioma. MRI with contrast study suggested.   - Neurosurgery recs (consulted for severe stenosis and possible meningioma): didn't suggest surgery at this time, will notify neurosurg of any change in neuro exam  - Pain control: lidocaine patch daily, robaxin 500 mg q8h, Tylenol 650 mg q6h, oxycodone 5 mg q6h, pregabalin 50 mg 3x daily  - Consult palliative for further pain control

## 2022-07-18 NOTE — PHYSICAL THERAPY INITIAL EVALUATION ADULT - PHYSICAL ASSIST/NONPHYSICAL ASSIST, REHAB EVAL
[FreeTextEntry1] : This patient presents today for general medical exam and to follow up for any medical issues. They deny any new health problems or new family medical history. The patient denies heat/cold intolerance, weight gain or loss, changes in their hair pattern, alteration in sleep habits, or change in their mood patterns. They also deny joint pain, rash, change in vision, or alteration in their bowel patterns.\par  \par 
2 person assist

## 2022-07-18 NOTE — CONSULT NOTE ADULT - ASSESSMENT
·	Please consult Chronic Pain team for pain recs. Would recommend 3-5 day course of dex 4 mg PO qAM and conservative use of opioids   ·	HCP is Michelle Brown #[phone], has copy of pts HCP form, Living Will  ·	Confirmed DNR DNI   ·	Pt does not have hospice dx  ·	Lives in [facility], [address]       Full note to follow   Palliative to sign off  85 yo female with PMH HFpEF (last ECHO 7/9), recently admitted for acute hypoxic resp failure 2/2 to CHF exacerbation, who presented to ED w atraumatic back pain. Found to have chronic degenerative spinal changes and cord compression, no acute intervention by neurosurgery. Palliative care consulted for symptom management of non malignant pain.     ·	Please consult Chronic Pain team for pain recs. Would recommend 3-5 day course of dex 4 mg PO qAM and conservative use of opioids   ·	HCP is Michelle Brown #[phone], has copy of pts HCP form, Living Will  ·	Confirmed DNR DNI   ·	Pt does not have hospice dx  ·	Lives in [facility], [address]     Palliative to sign off   Full note to follow

## 2022-07-18 NOTE — PROGRESS NOTE ADULT - PROBLEM SELECTOR PLAN 10
platelets: 140 --> 131 (7/15)  most likely reactive   - continue to trend with CBC platelets: 140 at admission and consistency low  most likely reactive   - continue to trend with CBC

## 2022-07-18 NOTE — PROGRESS NOTE ADULT - ASSESSMENT
85 yo female with morbid obesity, recent admission for HF, discharged two days ago, now presents with severe acute LBP, bandemia, found to have S. lugdunensis BSI in setting of MRI showing degenerative disc disease, spinal stenosis, and T9-10 herniation with cord compression; no overt findings of osteomyelitis or discitis on MRI.   - f/u surveillance bcx 7/16--ngtd  - gallium scan  - continue cefazolin 2g IV q8h     d/w primary team    ID Team 2

## 2022-07-18 NOTE — PHYSICAL THERAPY INITIAL EVALUATION ADULT - PERTINENT HX OF CURRENT PROBLEM, REHAB EVAL
Patient is an 86 F with PMH HFpEF (last ECHO 7/9) , HTN, RA, hypothyroidism, and sacral decubitus ulcer who presented to ED w atraumatic back pain. Pt reports pain occurred after quick movement following a nap yesterday. Patient reports feeling a sharp stabbing pain over b/l lower back. Pain does not radiate. Pain is worse with movement, however feels a dully, aching pain at rest. Patient has never felt a pain like this before.

## 2022-07-18 NOTE — PHYSICAL THERAPY INITIAL EVALUATION ADULT - ADDITIONAL COMMENTS
Pt states that she lives alone in an elevator access apartment with no steps to enter. Pt reports to be able to furnture surf in the home and uses a SC to ambulate in the community.

## 2022-07-18 NOTE — CONSULT NOTE ADULT - CONVERSATION DETAILS
Pts chart reviewed. Introduced the role of palliative medicine for advance care planning, GOC, and support. Discussed recent developments incld CT and MR results cw multilevel degen disc disease incl herniation T9/10, spondylosis, which is driving pts acute back pain. She is pending consult for Chronic Pain.     Discussed advance directives. Pt has Living will and HCP. 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 Reviewed code status, discussed risks/benefits of resuscitation. Pt w strong Holiness forrest and wishes to allow natural death in the event of card/resp arrest. Verbal consent obtained to complete DNR DNI MOLST.     GOC are to improve pain and maintain functional status for as long as possible. Dispo plans are [facility] and returning to [facility] where she feels well supported.     All questions answered, support provided.

## 2022-07-18 NOTE — CONSULT NOTE ADULT - SUBJECTIVE AND OBJECTIVE BOX
HPI:  Patient is an 86 F with PMH HFpEF (last ECHO 7/9) , HTN, RA, hypothyroidism, and sacral decubitus ulcer who presented to ED w atraumatic back pain. Pt reports pain occurred after quick movement following a nap yesterday. Patient reports feeling a sharp stabbing pain over b/l lower back. Pain does not radiate. Pain is worse with movement, however feels a dully, aching pain at rest. Patient has never felt a pain like this before. Patient denies any weight changes, fever/chills, nausea, vomiting, diaphoresis, headache, sore throat, chest pain, palpitations, LE edema, dyspnea, cough, wheezing, changes in appetite, constipation, diarrhea, abdominal pain, burning on urination, urinary frequency, dizziness, or fainting/LOC.   Patient reports mother with breast cancer and father with metastatic colon cancer.     Of note, pt was admitted to [facility] from 7/9-7/12 for acute hypoxic respiratory failure 2/2 CHF exacerbation    In ED: T: 98.5; HR: 78; BP: 106/52; RR: 18; 97% on 2L NC  Labs: WBC: 6.43 with 11% bandemia (neut); RBC: 3.26; Hgb: 10.4; Hct: 31.5; RDW: 20.7; PLT: 140; BUN: 60; Cr: 2.03 (baseline 0.9); GFR: 24; CRP: 21; lactate: 1.1  UA: negative; RVP: negative   Imaging:   CT abd/pelvis: 1. Large complex cystic mass in the upper abdomen measuring up to 12.1 cm which appears to arise from the body of the pancreas. Further characterization   with nonemergent abdominal MRI (pancreatic protocol, without and with contrast) is recommended.  2. Incidental 6 mm solid left lower lobe nodule. See follow up recommendations below.  3. Sigmoid diverticulosis without evidence of diverticulitis.  CT lumbar spine: 1. No acute abnormality in the lumbar spine.  2. Advanced lumbar spondylosis, most severe at L4-L5 with grade 1 anterolisthesis contributing to spinal stenosis; more moderate spinal stenosis at L1-2 from posterior osteophyte ridge; see additional  detail above. Also, spinal stenosis at T9-10 with ventral calcified focus that may be calcified disc extrusion or possible meningioma.   (14 Jul 2022 12:14)    Pt seen and examined. C/o pain as described above in HPI. Pt unable to work with PT given 10/10 sharp, stabbing pain that worsens w movement. No relief at present except when pt lies still. Denies saddle anesthesia, changes in BB, paresthesias to BLE.     Explored ACP, GOC, as noted below.       PRESENT SYMPTOMS:   [ ]Unable to obtain due to poor mentation   Source if other than patient:  [ ]Family   [ ]Team     Pain [x  ]  Location :      thoracic spine   Quality: sharp stabbing electric   Radiation: intermittently radiates through abdomen   Timing: only with movement, repositioning.   Severity in last 24h (0-10 scale) : 10/10  Current score (0-10 scale): 0/10   Improves with: immobility lying on back     PAIN AD Score: 0  [email or web address] (press ctrl +  left click to view)    If [  ], pt denies symptom.   Dyspnea:         [  ]  Anxiety:           [  ]  Difficulty sleeping: [  ]  Fatigue:           [  ]  Nausea:           [  ]  Loss of appetite:     [  ]  Dysphagia: [  ]  Constipation:   [  ]        Grief Present   [  ] Yes   [x  ] No   Other Symptoms:    All other review of systems negative [ x ]    Opiate Naive (Y/N):  No  iStop reviewed (Y/N): Yes.   No Rx found on iStop review (Ref#:     [id number]      PAST MEDICAL & SURGICAL HISTORY:  Fluid retention  Hypothyroidism  H/O CHF  S/P appendectomy  S/P cholecystectomy  S/P hysterectomy    FAMILY HISTORY:   Reviewed and found non contributory in mother or father    Items that are not checked are not present  PSYCHOSOCIAL ASSESSMENT:  - Significant other/partner: [  ]  Children: [  ]  - Living Situation: Home [  ] Long term care [  ]  Rehab[  ]  Other[x  ] Covenant   - Support system: strong[ x ] adequate[  ] inadequate[  ]  - Roman Catholic/Spiritual practice: Catholic   - Role of organized Pentecostal important [ x ] some [  ] unable to assess dt pt mentation [  ]  - Coping: well[  ]  with difficulty[x  ]  poor coping[  ]  unable to assess dt pt mentation [  ]    ADVANCE DIRECTIVES:    - MOLST[  ]    Living Will [  ]   DECISION MAKER(s):  - Health Care Proxy(s) [  ]  Surrogate(s)[  ] Guardian[  ]           Name(s)/Phone Number(s):   - Healthcare navigator, Barbara Ocampoe  - HCP Michelle Brown [phone]    BASELINE (I)ADLs (prior to admission):  - [place]:  Total[x  ] Moderate[  ] Dependent[  ]    Allergies  Levaquin (Hives)  Intolerances    Medications:      MEDICATIONS  (STANDING):  acetaminophen     Tablet .. 650 milliGRAM(s) Oral every 6 hours  ascorbic acid 500 milliGRAM(s) Oral daily  ceFAZolin   IVPB 2000 milliGRAM(s) IV Intermittent every 8 hours  ceFAZolin   IVPB      enoxaparin Injectable 40 milliGRAM(s) SubCutaneous every 12 hours  furosemide    Tablet 20 milliGRAM(s) Oral <User Schedule>  levothyroxine 137 MICROGram(s) Oral every 24 hours  lidocaine   4% Patch 1 Patch Transdermal daily  methocarbamol 500 milliGRAM(s) Oral every 8 hours  metoprolol succinate ER 12.5 milliGRAM(s) Oral every 24 hours  multivitamin 1 Tablet(s) Oral daily  nystatin Powder 1 Application(s) Topical two times a day  oxyCODONE    IR 5 milliGRAM(s) Oral every 4 hours  polyethylene glycol 3350 17 Gram(s) Oral every 24 hours  potassium phosphate IVPB 15 milliMole(s) IV Intermittent once  pregabalin 50 milliGRAM(s) Oral three times a day  senna 1 Tablet(s) Oral every 24 hours  zinc sulfate 220 milliGRAM(s) Oral daily    MEDICATIONS  (PRN):  HYDROmorphone  Injectable 1 milliGRAM(s) IV Push once PRN procedure  HYDROmorphone  Injectable 1 milliGRAM(s) IV Push once PRN procedure      Labs:    CBC:                        9.8    6.06  )-----------( 146      ( 20 Jul 2022 08:14 )             30.3     CMP:    07-20    136  |  95<L>  |  19  ----------------------------<  101<H>  3.9   |  30  |  0.79    Ca    8.4      20 Jul 2022 08:14  Phos  3.0     07-20  Mg     1.9     07-20             RADIOLOGY & ADDITIONAL STUDIES:  Imaging:  Reviewed    < from: Xray Chest 1 View- PORTABLE-Routine (Xray Chest 1 View- PORTABLE-Routine in AM.) (07.11.22 @ 06:43) >  Findings/  impression: Bilateral opacities. Stable cardiomegaly, thoracic aortic   calcification. Stable bony structures    < from: CT Abdomen and Pelvis No Cont (07.14.22 @ 05:03) >  IMPRESSION:  1. Large complex cystic mass in the upper abdomen measuring up to 12.1 cm   which  appears to arise from the body of the pancreas. Further characterization   with  nonemergent abdominal MRI (pancreatic protocol, without and with   contrast) is  recommended.  2. Incidental 6 mm solid left lower lobe nodule. See follow up   recommendations  below.  3. Sigmoid diverticulosis without evidence of diverticulitis.  4. Additional incidental/nonemergent findings are discussed in the body   of the  report.    < from: CT Lumbar Spine No Cont (07.14.22 @ 05:03) >  IMPRESSION:  1. No acute abnormality in the lumbar spine.  2. Advanced lumbar spondylosis, most severe at L4-L5 with grade 1   anterolisthesis contributing to spinal stenosis; more moderate spinal   stenosis at L1-2 from posterior osteophyte ridge; see additional  detail   above. Also, spinal stenosis at T9-10 with ventral calcified focus that   may be calcified disc extrusion or possible meningioma.    < from: MR Lumbar Spine No Cont (07.16.22 @ 12:38) >  IMPRESSION: Grade1 retrolisthesis of L1 on L2 and anterolisthesis of L4   on L5. Multilevel degenerative disc disease with spinal stenosis and   neural foramen narrowing as detailed above.    < from: MR Thoracic Spine No Cont (07.16.22 @ 12:39) >  IMPRESSION: Multilevel degenerative disc disease. Probable large central   disc herniation T9/10 with partially calcified extruded disc material   extending superiorly behind the T9 vertebral body and compressing the   spinal cord. Alternative considerations would include a meningioma. This   could be further evaluated with a contrast study.    PROTEIN CALORIE MALNUTRITION PRESENT:  [ ] Yes  [x ] No  Albumin, Serum:     [ ] PPSV2 < or = to 30%   [ ] significant weight loss    [ ] poor nutritional intake  [ ] catabolic state   [ ] anasarca       [ ] Artificial Nutrition    PEx:  T(C): 36.6 (07-20-22 @ 06:28), Max: 36.8 (07-19-22 @ 21:02)  HR: 61 (07-20-22 @ 06:28) (61 - 78)  BP: 115/74 (07-20-22 @ 06:28) (104/66 - 115/74)  RR: 18 (07-20-22 @ 06:28) (17 - 18)  SpO2: 96% (07-20-22 @ 06:28) (93% - 98%)  Wt(kg): --    ECOG Performance:     4  Current Palliative Performance Scale/Karnofsky Score:    30%  Preadmit Karnofsky: 70  %            General: elderly obese woman lying flat in bed, alert  oriented x 3   HEENT: atraumatic, No abnormal lesions, No dry mouth,   No temporal wasting,   RESP: Reg rhythm,   No tachypnea/labored breathing, diminished bilat bases  CV: RRR, S1S2, No   tachycardia  GI: obese  soft non distended non tender functionally incontinent dt pain   : omalley   MUSK: strength ble 5/5,  fully  BB  SKIN: No abnormal skin lesions, Poor skin turgor, Pressure ulcer stage: sacral   NEURO:  No deficits, cognitive impairment, encephalopathic dsyphagia dysarthria No paresis  Oral intake ability:  full capability      BMI: 48  Wt: 113  Cachexia (Y/N): N    PALLIATIVE MEDICINE COORDINATION OF CARE DOCUMENTATION: [x] Inpatient Consult  Non-Face-to-Face prolonged service provided that relates to (face-to-face) care that has or will occur and ongoing patient management, including one or more of the following: - Reviewed documentation from other physicians and other health care professional services - Reviewed medical records and diagnostic / radiology study results - Coordination with patient's support system  ************************************************************************  MEDICATION REVIEW:  - See Medication List Above    ISTOP REFERENCE:   - no active Rxs   - PRN usage: NO PRN'S  ------------------------------------------------------------------------  COORDINATION OF CARE:  - Palliative Care consulted for: GOC / Symptom Management  - Patient (to be) assessed: 7/18  - Patient previously seen by Palliative Care service: NO    ADVANCE CARE PLANNING  - Code status: FULL  - MOLST reviewed in chart: NONE; None found on Alpha  - HCP/ Surrogate: NONE found on Alpha  - GOC documents: NONE found on Alpha  - HCP/ Living will/Other Advanced Directives in Alpha: NONE found on Alpha  ------------------------------------------------------------------------  CARE PROVIDER DOCUMENTATION:  - SW/CM notes: Remains medically active  -   -     PLAN OF CARE  - Known admissions to [facility] in past year: two  - Current admit date: 7/14  - LOS:  4 days  - LACE score:  13  - Current dispo plan: [facility]    07-14-22 (6d)  ------------------------------------------------------------------------  - Time Spent/Chart reviewed: 31 Minutes [including time used to gather, review and transfer data]  - Start: 1010a 7/20  - End: 1040    Prolonged services rendered, as part of this patient's care provided by Palliative Medicine, include: i. chart review for provider and ancillary service documentation, ii. pertinent diagnostics including laboratory and imaging studies, iii. medication review including PRN use, iv. admission history including previous palliative care encounters and GOC notes, v. advance care planning documents including HCP and MOLST forms in Alpha. Part of Palliative Medicine extended evaluation and management also involves coordination of care with our IDT, the primary and consulting teams, and unit CM/SW and Hospice if eligible. Recommendations based on the information gathered and discussed are outlined in the A/P of Palliative notes.

## 2022-07-19 ENCOUNTER — TRANSCRIPTION ENCOUNTER (OUTPATIENT)
Age: 87
End: 2022-07-19

## 2022-07-19 DIAGNOSIS — B37.9 CANDIDIASIS, UNSPECIFIED: ICD-10-CM

## 2022-07-19 DIAGNOSIS — J96.01 ACUTE RESPIRATORY FAILURE WITH HYPOXIA: ICD-10-CM

## 2022-07-19 DIAGNOSIS — I50.33 ACUTE ON CHRONIC DIASTOLIC (CONGESTIVE) HEART FAILURE: ICD-10-CM

## 2022-07-19 DIAGNOSIS — E03.9 HYPOTHYROIDISM, UNSPECIFIED: ICD-10-CM

## 2022-07-19 DIAGNOSIS — M06.9 RHEUMATOID ARTHRITIS, UNSPECIFIED: ICD-10-CM

## 2022-07-19 DIAGNOSIS — I11.0 HYPERTENSIVE HEART DISEASE WITH HEART FAILURE: ICD-10-CM

## 2022-07-19 LAB
ANION GAP SERPL CALC-SCNC: 10 MMOL/L — SIGNIFICANT CHANGE UP (ref 5–17)
ANISOCYTOSIS BLD QL: SLIGHT — SIGNIFICANT CHANGE UP
BASOPHILS # BLD AUTO: 0 K/UL — SIGNIFICANT CHANGE UP (ref 0–0.2)
BASOPHILS NFR BLD AUTO: 0 % — SIGNIFICANT CHANGE UP (ref 0–2)
BUN SERPL-MCNC: 19 MG/DL — SIGNIFICANT CHANGE UP (ref 7–23)
CALCIUM SERPL-MCNC: 8.6 MG/DL — SIGNIFICANT CHANGE UP (ref 8.4–10.5)
CHLORIDE SERPL-SCNC: 95 MMOL/L — LOW (ref 96–108)
CO2 SERPL-SCNC: 31 MMOL/L — SIGNIFICANT CHANGE UP (ref 22–31)
CREAT SERPL-MCNC: 0.7 MG/DL — SIGNIFICANT CHANGE UP (ref 0.5–1.3)
DACRYOCYTES BLD QL SMEAR: SLIGHT — SIGNIFICANT CHANGE UP
EGFR: 85 ML/MIN/1.73M2 — SIGNIFICANT CHANGE UP
EOSINOPHIL # BLD AUTO: 0.06 K/UL — SIGNIFICANT CHANGE UP (ref 0–0.5)
EOSINOPHIL NFR BLD AUTO: 0.9 % — SIGNIFICANT CHANGE UP (ref 0–6)
GIANT PLATELETS BLD QL SMEAR: PRESENT — SIGNIFICANT CHANGE UP
GLUCOSE SERPL-MCNC: 101 MG/DL — HIGH (ref 70–99)
HCT VFR BLD CALC: 32.8 % — LOW (ref 34.5–45)
HGB BLD-MCNC: 10.5 G/DL — LOW (ref 11.5–15.5)
HYPOCHROMIA BLD QL: SLIGHT — SIGNIFICANT CHANGE UP
LYMPHOCYTES # BLD AUTO: 1.51 K/UL — SIGNIFICANT CHANGE UP (ref 1–3.3)
LYMPHOCYTES # BLD AUTO: 24.3 % — SIGNIFICANT CHANGE UP (ref 13–44)
MAGNESIUM SERPL-MCNC: 1.9 MG/DL — SIGNIFICANT CHANGE UP (ref 1.6–2.6)
MANUAL SMEAR VERIFICATION: SIGNIFICANT CHANGE UP
MCHC RBC-ENTMCNC: 30.9 PG — SIGNIFICANT CHANGE UP (ref 27–34)
MCHC RBC-ENTMCNC: 32 GM/DL — SIGNIFICANT CHANGE UP (ref 32–36)
MCV RBC AUTO: 96.5 FL — SIGNIFICANT CHANGE UP (ref 80–100)
MONOCYTES # BLD AUTO: 1.14 K/UL — HIGH (ref 0–0.9)
MONOCYTES NFR BLD AUTO: 18.3 % — HIGH (ref 2–14)
MYELOCYTES NFR BLD: 4.3 % — HIGH (ref 0–0)
NEUTROPHILS # BLD AUTO: 3.14 K/UL — SIGNIFICANT CHANGE UP (ref 1.8–7.4)
NEUTROPHILS NFR BLD AUTO: 47.8 % — SIGNIFICANT CHANGE UP (ref 43–77)
NEUTS BAND # BLD: 2.6 % — SIGNIFICANT CHANGE UP (ref 0–8)
OVALOCYTES BLD QL SMEAR: SLIGHT — SIGNIFICANT CHANGE UP
PHOSPHATE SERPL-MCNC: 2.9 MG/DL — SIGNIFICANT CHANGE UP (ref 2.5–4.5)
PLAT MORPH BLD: NORMAL — SIGNIFICANT CHANGE UP
PLATELET # BLD AUTO: 142 K/UL — LOW (ref 150–400)
POIKILOCYTOSIS BLD QL AUTO: SIGNIFICANT CHANGE UP
POLYCHROMASIA BLD QL SMEAR: SLIGHT — SIGNIFICANT CHANGE UP
POTASSIUM SERPL-MCNC: 3.6 MMOL/L — SIGNIFICANT CHANGE UP (ref 3.5–5.3)
POTASSIUM SERPL-SCNC: 3.6 MMOL/L — SIGNIFICANT CHANGE UP (ref 3.5–5.3)
PROMYELOCYTES # FLD: 0.9 % — HIGH (ref 0–0)
RBC # BLD: 3.4 M/UL — LOW (ref 3.8–5.2)
RBC # FLD: 19.4 % — HIGH (ref 10.3–14.5)
RBC BLD AUTO: ABNORMAL
SCHISTOCYTES BLD QL AUTO: SLIGHT — SIGNIFICANT CHANGE UP
SODIUM SERPL-SCNC: 136 MMOL/L — SIGNIFICANT CHANGE UP (ref 135–145)
VARIANT LYMPHS # BLD: 0.9 % — SIGNIFICANT CHANGE UP (ref 0–6)
WBC # BLD: 6.23 K/UL — SIGNIFICANT CHANGE UP (ref 3.8–10.5)
WBC # FLD AUTO: 6.23 K/UL — SIGNIFICANT CHANGE UP (ref 3.8–10.5)

## 2022-07-19 PROCEDURE — 99232 SBSQ HOSP IP/OBS MODERATE 35: CPT

## 2022-07-19 PROCEDURE — 99233 SBSQ HOSP IP/OBS HIGH 50: CPT | Mod: GC

## 2022-07-19 RX ORDER — POLYETHYLENE GLYCOL 3350 17 G/17G
17 POWDER, FOR SOLUTION ORAL EVERY 24 HOURS
Refills: 0 | Status: DISCONTINUED | OUTPATIENT
Start: 2022-07-19 | End: 2022-07-22

## 2022-07-19 RX ORDER — POTASSIUM PHOSPHATE, MONOBASIC POTASSIUM PHOSPHATE, DIBASIC 236; 224 MG/ML; MG/ML
15 INJECTION, SOLUTION INTRAVENOUS ONCE
Refills: 0 | Status: COMPLETED | OUTPATIENT
Start: 2022-07-19 | End: 2022-07-19

## 2022-07-19 RX ORDER — SENNA PLUS 8.6 MG/1
1 TABLET ORAL EVERY 24 HOURS
Refills: 0 | Status: DISCONTINUED | OUTPATIENT
Start: 2022-07-19 | End: 2022-07-22

## 2022-07-19 RX ORDER — HYDROMORPHONE HYDROCHLORIDE 2 MG/ML
1 INJECTION INTRAMUSCULAR; INTRAVENOUS; SUBCUTANEOUS ONCE
Refills: 0 | Status: DISCONTINUED | OUTPATIENT
Start: 2022-07-19 | End: 2022-07-22

## 2022-07-19 RX ADMIN — ZINC SULFATE TAB 220 MG (50 MG ZINC EQUIVALENT) 220 MILLIGRAM(S): 220 (50 ZN) TAB at 11:37

## 2022-07-19 RX ADMIN — Medication 1 TABLET(S): at 11:37

## 2022-07-19 RX ADMIN — Medication 12.5 MILLIGRAM(S): at 18:07

## 2022-07-19 RX ADMIN — Medication 137 MICROGRAM(S): at 06:48

## 2022-07-19 RX ADMIN — Medication 100 MILLIGRAM(S): at 11:37

## 2022-07-19 RX ADMIN — METHOCARBAMOL 500 MILLIGRAM(S): 500 TABLET, FILM COATED ORAL at 18:09

## 2022-07-19 RX ADMIN — Medication 100 MILLIGRAM(S): at 00:19

## 2022-07-19 RX ADMIN — Medication 650 MILLIGRAM(S): at 01:20

## 2022-07-19 RX ADMIN — POTASSIUM PHOSPHATE, MONOBASIC POTASSIUM PHOSPHATE, DIBASIC 62.5 MILLIMOLE(S): 236; 224 INJECTION, SOLUTION INTRAVENOUS at 13:35

## 2022-07-19 RX ADMIN — METHOCARBAMOL 500 MILLIGRAM(S): 500 TABLET, FILM COATED ORAL at 09:26

## 2022-07-19 RX ADMIN — OXYCODONE HYDROCHLORIDE 5 MILLIGRAM(S): 5 TABLET ORAL at 19:08

## 2022-07-19 RX ADMIN — SENNA PLUS 1 TABLET(S): 8.6 TABLET ORAL at 22:21

## 2022-07-19 RX ADMIN — Medication 50 MILLIGRAM(S): at 06:49

## 2022-07-19 RX ADMIN — OXYCODONE HYDROCHLORIDE 5 MILLIGRAM(S): 5 TABLET ORAL at 02:12

## 2022-07-19 RX ADMIN — Medication 650 MILLIGRAM(S): at 13:35

## 2022-07-19 RX ADMIN — LIDOCAINE 1 PATCH: 4 CREAM TOPICAL at 14:42

## 2022-07-19 RX ADMIN — NYSTATIN CREAM 1 APPLICATION(S): 100000 CREAM TOPICAL at 18:09

## 2022-07-19 RX ADMIN — OXYCODONE HYDROCHLORIDE 5 MILLIGRAM(S): 5 TABLET ORAL at 06:48

## 2022-07-19 RX ADMIN — METHOCARBAMOL 500 MILLIGRAM(S): 500 TABLET, FILM COATED ORAL at 02:12

## 2022-07-19 RX ADMIN — OXYCODONE HYDROCHLORIDE 5 MILLIGRAM(S): 5 TABLET ORAL at 09:26

## 2022-07-19 RX ADMIN — Medication 500 MILLIGRAM(S): at 11:43

## 2022-07-19 RX ADMIN — Medication 650 MILLIGRAM(S): at 19:08

## 2022-07-19 RX ADMIN — OXYCODONE HYDROCHLORIDE 5 MILLIGRAM(S): 5 TABLET ORAL at 03:12

## 2022-07-19 RX ADMIN — OXYCODONE HYDROCHLORIDE 5 MILLIGRAM(S): 5 TABLET ORAL at 13:35

## 2022-07-19 RX ADMIN — Medication 50 MILLIGRAM(S): at 22:21

## 2022-07-19 RX ADMIN — POLYETHYLENE GLYCOL 3350 17 GRAM(S): 17 POWDER, FOR SOLUTION ORAL at 09:26

## 2022-07-19 RX ADMIN — LIDOCAINE 1 PATCH: 4 CREAM TOPICAL at 21:49

## 2022-07-19 RX ADMIN — Medication 650 MILLIGRAM(S): at 14:35

## 2022-07-19 RX ADMIN — NYSTATIN CREAM 1 APPLICATION(S): 100000 CREAM TOPICAL at 06:54

## 2022-07-19 RX ADMIN — OXYCODONE HYDROCHLORIDE 5 MILLIGRAM(S): 5 TABLET ORAL at 10:26

## 2022-07-19 RX ADMIN — ENOXAPARIN SODIUM 40 MILLIGRAM(S): 100 INJECTION SUBCUTANEOUS at 06:48

## 2022-07-19 RX ADMIN — Medication 50 MILLIGRAM(S): at 13:35

## 2022-07-19 RX ADMIN — Medication 100 MILLIGRAM(S): at 18:07

## 2022-07-19 RX ADMIN — Medication 650 MILLIGRAM(S): at 00:20

## 2022-07-19 RX ADMIN — LIDOCAINE 1 PATCH: 4 CREAM TOPICAL at 21:38

## 2022-07-19 RX ADMIN — OXYCODONE HYDROCHLORIDE 5 MILLIGRAM(S): 5 TABLET ORAL at 22:20

## 2022-07-19 RX ADMIN — ENOXAPARIN SODIUM 40 MILLIGRAM(S): 100 INJECTION SUBCUTANEOUS at 18:09

## 2022-07-19 RX ADMIN — OXYCODONE HYDROCHLORIDE 5 MILLIGRAM(S): 5 TABLET ORAL at 18:08

## 2022-07-19 RX ADMIN — OXYCODONE HYDROCHLORIDE 5 MILLIGRAM(S): 5 TABLET ORAL at 14:45

## 2022-07-19 RX ADMIN — Medication 650 MILLIGRAM(S): at 06:48

## 2022-07-19 RX ADMIN — Medication 650 MILLIGRAM(S): at 18:08

## 2022-07-19 NOTE — DISCHARGE NOTE PROVIDER - DETAILS OF MALNUTRITION DIAGNOSIS/DIAGNOSES
This patient has been assessed with a concern for Malnutrition and was treated during this hospitalization for the following Nutrition diagnosis/diagnoses:     -  07/15/2022: Morbid obesity (BMI > 40)

## 2022-07-19 NOTE — PROGRESS NOTE ADULT - ASSESSMENT
85 yo female with morbid obesity, recent admission for HF, discharged two days ago, now presents with severe acute LBP, bandemia, found to have S. lugdunensis BSI in setting of MRI showing degenerative disc disease, spinal stenosis, and T9-10 herniation with cord compression; no overt findings of osteomyelitis or discitis on MRI.   - f/u surveillance bcx 7/16--ngtd  - f/u gallium scan  - continue cefazolin 2g IV q8h     ID Team 2

## 2022-07-19 NOTE — PROGRESS NOTE ADULT - ASSESSMENT
per Internal Medicine    86 y o  female with PMH HFpEF (diagnosed in last admission 7/9-12, last ECHO 7/9), sacral decubitus ulcer, who presented to ED w atraumatic back pain 1 day after hospital discharge for acute hypoxic resp failure 2/2 to CHF exacerbation, was admitted for pre renal HERNESTO, and severe back pain found to have +Bcx treated with cefazolin.       Problem/Plan - 1:  ·  Problem: Acute back pain.   ·  Plan: Presented with Acute back pain after quick movement   - CT followed by MRI of Lumbar and Thoracic spine with no contrast: showed Multilevel degenerative disc disease. Probable large central disc herniation T9/10 with partially calcified extruded disc material extending superiorly behind the T9 vertebral body and compressing the spinal cord.  - Neurosurgery recs (consulted for severe stenosis and possible meningioma): didn't suggest surgery at this time, will notify neurosurg of any change in neuro exam  - Pain control: lidocaine patch daily, robaxin 500 mg q8h, Tylenol 650 mg q6h, oxycodone 5 mg q6h, pregabalin 50 mg 3x daily  - Palliative consulted for further pain control- suggested contacting Chronic pain for recs.    Problem/Plan - 2:  ·  Problem: Gram-positive bacteremia.   ·  Plan: admission bloodwork showed no leukocytosis (WBC: 6.43) with bandemia 11%. Patient was afebrile, stable vitals with no sx of active infection.   - Bcx positive for Staph lungdunensis (7/14)  - Bcx from 7/16 and 7/17 showed no growth   - ID recs: Gallium scan ?source  - Continuing IV cefazolin (D4) as per ID recs (transitioned from vanc)  - UA negative for UTI, RVP negative; no focal consolidations on CXR (recent admission)  - TTE showed no evidence of endocarditis.    Problem/Plan - 3:  ·  Problem: HERNESTO (acute kidney injury).   ·  Plan: Resolved. Patient recently admitted for CHF. Since discharge, significantly decreased fluid intake. In ED: BUN: 60; Cr: 2.03  - most likely prerenal 2/2 to reduced PO intake and in the setting of lasix use   - unremarkable UA   - encouraged increased PO fluid intake   - Restarted lasix but continuing to hold losartan.    Problem/Plan - 4:  ·  Problem: Pressure ulcer.   ·  Plan: Pt with known pressure ulcer on sacrum   - stage 2   - c/w wound care recs.    Problem/Plan - 5:  ·  Problem: Acute respiratory failure with hypoxia.   ·  Plan: SpO2 drops to 85% on RA  - SpO2 at 97% on 2L NC.    Problem/Plan - 6:  ·  Problem: Intertrigo.   ·  Plan: Erythematous scaly patches present diffusely in skin folds  - Treated w/ nystatin powder.    Problem/Plan - 7:  ·  Problem: (HFpEF) heart failure with preserved ejection fraction.   ·  Plan: Patient on Lasix 20 mg PO M/W/F and losartan 25 mg qd; Toprol 12.5 qd at home  - recent admission to [facility] due to HF exacerbation   - c/w home meds toprol  - Restarted lasix and continued to hold losartan in the setting of resolved HERNESTO and bacteremia  - DASH/TLC diet  - f/u outpatient HF team  - Echo showed evidence of mild LVH w/ EF of 50%, Grade I LV diastolic dysfunction and mildly dilated LA.    Problem/Plan - 8:  ·  Problem: Anemia.   ·  Plan: Hgb: 10.0 with RDW 20.5, ferritin 793  Consistent with anemia of chronic disease with unknown source (possibly HF, obesity)  - monitor CBC daily.    Problem/Plan - 9:  ·  Problem: Pancreatic mass.   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 Pt reports not having follow up in years.   - CEA and  not elevated  -f/up outpatient for abdominal MRI (pancreatic protocol, without and with contrast).    Problem/Plan - 10:  ·  Problem: Thrombocytopenia.   ·  Plan; platelets: 140 at admission and consistency low  most likely reactive   - continue to trend with CBC.    Problem/Plan - 11:  ·  Problem: Pulmonary nodule.   ·  Plan: -6 mm solid L lower lobe nodule; no hx smoking   -fup CT chest at 6-12 mos outpatient.    Problem/Plan - 12:  ·  Problem: Morbid obesity.   ·  Plan: Patient has BMI of 48.3  - Nutrition recs followed.

## 2022-07-19 NOTE — PROGRESS NOTE ADULT - SUBJECTIVE AND OBJECTIVE BOX
***INCOMPLETE***    INTERVAL HPI/OVERNIGHT EVENTS:  Patient was seen and examined at bedside. As per patient, continues to have severe sharp stabbing lower back pain with movement (severity 10/10). Pain 1/10 severity at rest. More comfortable compared to yesterday. Patient denies: urinary or bowel incontinence, loss of sensation, paresthesias, headaches, nausea, vomiting, night sweats. Patient admits to intermittent chills. She's having fluids but no solid food.     VITAL SIGNS:  T(F): 97.8 (07-19-22 @ 06:14)  HR: 68 (07-19-22 @ 06:14)  BP: 123/80 (07-19-22 @ 06:14)  RR: 19 (07-19-22 @ 06:14)  SpO2: 96% (07-19-22 @ 06:14)  Wt(kg): --    PHYSICAL EXAM:    Constitutional: Patient is awake and alert; Morbidly obese; Appears to be resting comfortable when not moving.  HEENT: EOMI, CN II-XII intact, sclera non-icteric  Respiratory: CTA b/l, no wheezing, no rhonchi, no rales  Cardiovascular: normal S1S2; +3/6 systolic murmur loudest at the right upper sternal border that radiates to the carotids  Gastrointestinal: soft, NTND, no masses palpable; erythematous scaly patches along abdominal folds concerning for intertrigo covered in nystatin powder; bruising noted bilaterally in lower abdominal quadrants  Extremities: Warm, well perfused; radial and posterior tibial pulses present bilaterally; has slight pitting edema b/l; bandage covering lesion on L hip; intertrigo in popliteal fossa bilaterally; LE range of motion limited by pain b/l; no dermatomal sensation deficits noted in UEs or LEs; 2+ UE reflexes, Lower extremity reflexes hard to ascertain due to patient position; slightly decreased light touch sensation in toe phalanges, proprioception detection intact b/l  Patient in extreme pain with movement so not able to visualize back or sacral ulcer    MEDICATIONS  (STANDING):  acetaminophen     Tablet .. 650 milliGRAM(s) Oral every 6 hours  ascorbic acid 500 milliGRAM(s) Oral daily  ceFAZolin   IVPB      ceFAZolin   IVPB 2000 milliGRAM(s) IV Intermittent every 8 hours  enoxaparin Injectable 40 milliGRAM(s) SubCutaneous every 12 hours  furosemide    Tablet 20 milliGRAM(s) Oral <User Schedule>  levothyroxine 137 MICROGram(s) Oral every 24 hours  lidocaine   4% Patch 1 Patch Transdermal daily  methocarbamol 500 milliGRAM(s) Oral every 8 hours  metoprolol succinate ER 12.5 milliGRAM(s) Oral every 24 hours  multivitamin 1 Tablet(s) Oral daily  nystatin Powder 1 Application(s) Topical two times a day  oxyCODONE    IR 5 milliGRAM(s) Oral every 4 hours  pregabalin 50 milliGRAM(s) Oral three times a day  zinc sulfate 220 milliGRAM(s) Oral daily    Allergies    Levaquin (Hives)    LABS:                        10.4   4.87  )-----------( 108      ( 18 Jul 2022 05:30 )             32.4     07-18    138  |  96  |  23  ----------------------------<  99  4.1   |  31  |  0.79    Ca    8.8      18 Jul 2022 05:30  Phos  2.3     07-18  Mg     2.1     07-18    TPro  6.7  /  Alb  3.3  /  TBili  0.5  /  DBili  x   /  AST  51<H>  /  ALT  13  /  AlkPhos  58  07-18    BNP 4812    Anaerobic culture bottle grew Staph lugdunensis - switched to cefazolin 2g q8h as per ID recs    RADIOLOGY & ADDITIONAL TESTS:  Multilevel degenerative disc disease. Probable large central disc herniation T9/10 with partially calcified extruded disc material extending superiorly behind the T9 vertebral body and compressing the spinal cord. Alternative considerations would include a meningioma. This could be further evaluated with a contrast study.    Grade 1 retrolisthesis of L1 on L2 and anterolisthesis of L4 on L5. Multilevel degenerative disc disease with spinal stenosis and neural foramen narrowing as detailed above.       ***INCOMPLETE***    INTERVAL HPI/OVERNIGHT EVENTS:  Patient was seen and examined at bedside. As per patient, continues to have severe sharp stabbing lower back pain with movement (severity 10/10). Same as yesterday. Patient denies: urinary or bowel incontinence, loss of sensation, paresthesias, headaches, nausea, vomiting, night sweats, chills, CP, SOB. She's having fluids but has mouth dryness. She's had no BM since admission.     VITAL SIGNS:  T(F): 97.8 (07-19-22 @ 06:14)  HR: 68 (07-19-22 @ 06:14)  BP: 123/80 (07-19-22 @ 06:14)  RR: 19 (07-19-22 @ 06:14)  SpO2: 96% (07-19-22 @ 06:14)  Wt(kg): --    PHYSICAL EXAM:    Constitutional: Patient is awake and alert; Morbidly obese; Appears to be resting comfortable when not moving.  HEENT: EOMI, CN II-XII intact, sclera non-icteric  Respiratory: CTA b/l, no wheezing, no rhonchi, no rales  Cardiovascular: normal S1S2; +3/6 systolic murmur loudest at the right upper sternal border that radiates to the carotids  Gastrointestinal: soft, NTND, no masses palpable; hyperactive bowel sounds; erythematous scaly patches along abdominal folds concerning for intertrigo covered in nystatin powder; bruising noted bilaterally in lower abdominal quadrants  Extremities: Cool, well perfused; radial and posterior tibial pulses present bilaterally; bandage covering lesion on L hip; intertrigo in popliteal fossa bilaterally; LE range of motion limited by pain b/l; no dermatomal sensation deficits noted in UEs or LEs; 2+ UE reflexes, Lower extremity reflexes hard to ascertain due to patient position; negative Babinksi sign slightly decreased light touch sensation in toe phalanges concerning for peripheral neuropathy   Patient in extreme pain with movement so not able to visualize back or sacral ulcer    MEDICATIONS  (STANDING):  acetaminophen     Tablet .. 650 milliGRAM(s) Oral every 6 hours  ascorbic acid 500 milliGRAM(s) Oral daily  ceFAZolin   IVPB      ceFAZolin   IVPB 2000 milliGRAM(s) IV Intermittent every 8 hours  enoxaparin Injectable 40 milliGRAM(s) SubCutaneous every 12 hours  furosemide    Tablet 20 milliGRAM(s) Oral <User Schedule>  levothyroxine 137 MICROGram(s) Oral every 24 hours  lidocaine   4% Patch 1 Patch Transdermal daily  methocarbamol 500 milliGRAM(s) Oral every 8 hours  metoprolol succinate ER 12.5 milliGRAM(s) Oral every 24 hours  multivitamin 1 Tablet(s) Oral daily  nystatin Powder 1 Application(s) Topical two times a day  oxyCODONE    IR 5 milliGRAM(s) Oral every 4 hours  pregabalin 50 milliGRAM(s) Oral three times a day  zinc sulfate 220 milliGRAM(s) Oral daily    Allergies    Levaquin (Hives)    LABS:                        10.4   4.87  )-----------( 108      ( 18 Jul 2022 05:30 )             32.4     07-18    138  |  96  |  23  ----------------------------<  99  4.1   |  31  |  0.79    Ca    8.8      18 Jul 2022 05:30  Phos  2.3     07-18  Mg     2.1     07-18    TPro  6.7  /  Alb  3.3  /  TBili  0.5  /  DBili  x   /  AST  51<H>  /  ALT  13  /  AlkPhos  58  07-18    BNP 4812    Anaerobic culture bottle grew Staph lugdunensis - switched to cefazolin 2g q8h as per ID recs    RADIOLOGY & ADDITIONAL TESTS:  Multilevel degenerative disc disease. Probable large central disc herniation T9/10 with partially calcified extruded disc material extending superiorly behind the T9 vertebral body and compressing the spinal cord. Alternative considerations would include a meningioma. This could be further evaluated with a contrast study.    Grade 1 retrolisthesis of L1 on L2 and anterolisthesis of L4 on L5. Multilevel degenerative disc disease with spinal stenosis and neural foramen narrowing as detailed above.       ***INCOMPLETE***    INTERVAL HPI/OVERNIGHT EVENTS:  Patient was seen and examined at bedside. As per patient, continues to have severe sharp stabbing lower back pain with movement (severity 10/10). Same as yesterday. Patient denies: urinary or bowel incontinence, loss of sensation, paresthesias, headaches, nausea, vomiting, night sweats, chills, CP, SOB. She's having fluids but has mouth dryness. She's had no BM since admission.     VITAL SIGNS:  T(F): 97.8 (07-19-22 @ 06:14)  HR: 68 (07-19-22 @ 06:14)  BP: 123/80 (07-19-22 @ 06:14)  RR: 19 (07-19-22 @ 06:14)  SpO2: 96% (07-19-22 @ 06:14)  Wt(kg): --    PHYSICAL EXAM:    Constitutional: Patient is awake and alert; Morbidly obese; Appears to be resting comfortable when not moving.  HEENT: EOMI, CN II-XII intact, sclera non-icteric  Respiratory: CTA b/l, no wheezing, no rhonchi, no rales  Cardiovascular: normal S1S2; +3/6 systolic murmur loudest at the right upper sternal border that radiates to the carotids  Gastrointestinal: soft, NTND, no masses palpable; hyperactive bowel sounds; erythematous scaly patches along abdominal folds concerning for intertrigo covered in nystatin powder; bruising noted bilaterally in lower abdominal quadrants  Extremities: Cool, well perfused; radial and posterior tibial pulses present bilaterally; bandage covering lesion on L hip; intertrigo in popliteal fossa bilaterally; LE range of motion limited by pain b/l; no dermatomal sensation deficits noted in UEs or LEs; 2+ UE reflexes, Lower extremity reflexes hard to ascertain due to patient position; negative Babinksi sign; slightly decreased light touch sensation in toe phalanges concerning for peripheral neuropathy   Patient in extreme pain with movement so not able to visualize back or sacral ulcer    MEDICATIONS  (STANDING):  acetaminophen     Tablet .. 650 milliGRAM(s) Oral every 6 hours  ascorbic acid 500 milliGRAM(s) Oral daily  ceFAZolin   IVPB      ceFAZolin   IVPB 2000 milliGRAM(s) IV Intermittent every 8 hours  enoxaparin Injectable 40 milliGRAM(s) SubCutaneous every 12 hours  furosemide    Tablet 20 milliGRAM(s) Oral <User Schedule>  levothyroxine 137 MICROGram(s) Oral every 24 hours  lidocaine   4% Patch 1 Patch Transdermal daily  methocarbamol 500 milliGRAM(s) Oral every 8 hours  metoprolol succinate ER 12.5 milliGRAM(s) Oral every 24 hours  multivitamin 1 Tablet(s) Oral daily  nystatin Powder 1 Application(s) Topical two times a day  oxyCODONE    IR 5 milliGRAM(s) Oral every 4 hours  pregabalin 50 milliGRAM(s) Oral three times a day  zinc sulfate 220 milliGRAM(s) Oral daily    Allergies    Levaquin (Hives)    LABS:                        10.4   4.87  )-----------( 108      ( 18 Jul 2022 05:30 )             32.4     07-18    138  |  96  |  23  ----------------------------<  99  4.1   |  31  |  0.79    Ca    8.8      18 Jul 2022 05:30  Phos  2.3     07-18  Mg     2.1     07-18    TPro  6.7  /  Alb  3.3  /  TBili  0.5  /  DBili  x   /  AST  51<H>  /  ALT  13  /  AlkPhos  58  07-18    BNP 4812    Anaerobic culture bottle grew Staph lugdunensis - switched to cefazolin 2g q8h as per ID recs    RADIOLOGY & ADDITIONAL TESTS:  Multilevel degenerative disc disease. Probable large central disc herniation T9/10 with partially calcified extruded disc material extending superiorly behind the T9 vertebral body and compressing the spinal cord. Alternative considerations would include a meningioma. This could be further evaluated with a contrast study.    Grade 1 retrolisthesis of L1 on L2 and anterolisthesis of L4 on L5. Multilevel degenerative disc disease with spinal stenosis and neural foramen narrowing as detailed above.       INTERVAL HPI/OVERNIGHT EVENTS:  Patient was seen and examined at bedside. As per patient, continues to have severe sharp stabbing lower back pain with movement (severity 10/10). Same as yesterday. Patient denies: urinary or bowel incontinence, loss of sensation, paresthesias, headaches, nausea, vomiting, night sweats, chills, CP, SOB. She's having fluids but has mouth dryness. She's had no BM since admission.     VITAL SIGNS:  T(F): 97.8 (07-19-22 @ 06:14)  HR: 68 (07-19-22 @ 06:14)  BP: 123/80 (07-19-22 @ 06:14)  RR: 19 (07-19-22 @ 06:14)  SpO2: 96% (07-19-22 @ 06:14)  Wt(kg): --    PHYSICAL EXAM:    Constitutional: Patient is awake and alert; Morbidly obese; Appears to be resting comfortable when not moving.  HEENT: EOMI, CN II-XII intact, sclera non-icteric  Respiratory: CTA b/l, no wheezing, no rhonchi, no rales  Cardiovascular: normal S1S2; +3/6 systolic murmur loudest at the right upper sternal border that radiates to the carotids  Gastrointestinal: soft, NTND, no masses palpable; hyperactive bowel sounds; erythematous scaly patches along abdominal folds concerning for intertrigo covered in nystatin powder; bruising noted bilaterally in lower abdominal quadrants  Extremities: Cool, well perfused; radial and posterior tibial pulses present bilaterally; bandage covering lesion on L hip; intertrigo in popliteal fossa bilaterally; LE range of motion limited by pain b/l; no dermatomal sensation deficits noted in UEs or LEs; 2+ UE reflexes, Lower extremity reflexes hard to ascertain due to patient position; negative Babinksi sign; slightly decreased light touch sensation in toe phalanges concerning for peripheral neuropathy   Patient in extreme pain with movement so not able to visualize back or sacral ulcer    MEDICATIONS  (STANDING):  acetaminophen     Tablet .. 650 milliGRAM(s) Oral every 6 hours  ascorbic acid 500 milliGRAM(s) Oral daily  ceFAZolin   IVPB      ceFAZolin   IVPB 2000 milliGRAM(s) IV Intermittent every 8 hours  enoxaparin Injectable 40 milliGRAM(s) SubCutaneous every 12 hours  furosemide    Tablet 20 milliGRAM(s) Oral <User Schedule>  levothyroxine 137 MICROGram(s) Oral every 24 hours  lidocaine   4% Patch 1 Patch Transdermal daily  methocarbamol 500 milliGRAM(s) Oral every 8 hours  metoprolol succinate ER 12.5 milliGRAM(s) Oral every 24 hours  multivitamin 1 Tablet(s) Oral daily  nystatin Powder 1 Application(s) Topical two times a day  oxyCODONE    IR 5 milliGRAM(s) Oral every 4 hours  pregabalin 50 milliGRAM(s) Oral three times a day  zinc sulfate 220 milliGRAM(s) Oral daily    Allergies    Levaquin (Hives)    LABS:                        10.4   4.87  )-----------( 108      ( 18 Jul 2022 05:30 )             32.4     07-18    138  |  96  |  23  ----------------------------<  99  4.1   |  31  |  0.79    Ca    8.8      18 Jul 2022 05:30  Phos  2.3     07-18  Mg     2.1     07-18    TPro  6.7  /  Alb  3.3  /  TBili  0.5  /  DBili  x   /  AST  51<H>  /  ALT  13  /  AlkPhos  58  07-18    BNP 4812    Anaerobic culture bottle grew Staph lugdunensis - switched to cefazolin 2g q8h as per ID recs    RADIOLOGY & ADDITIONAL TESTS:  Multilevel degenerative disc disease. Probable large central disc herniation T9/10 with partially calcified extruded disc material extending superiorly behind the T9 vertebral body and compressing the spinal cord. Alternative considerations would include a meningioma. This could be further evaluated with a contrast study.    Grade 1 retrolisthesis of L1 on L2 and anterolisthesis of L4 on L5. Multilevel degenerative disc disease with spinal stenosis and neural foramen narrowing as detailed above.    TTE 7/19: mild LVH w/ EF of 50%, Grade I LV diastolic dysfunction and mildly dilated LA.

## 2022-07-19 NOTE — DISCHARGE NOTE PROVIDER - PROVIDER TOKENS
PROVIDER:[TOKEN:[[id number]],SCHEDULEDAPPT:[09/19/2022],SCHEDULEDAPPTTIME:[10:40 AM],ESTABLISHEDPATIENT:[T]] PROVIDER:[TOKEN:[[id number]],SCHEDULEDAPPT:[09/19/2022],SCHEDULEDAPPTTIME:[10:40 AM],ESTABLISHEDPATIENT:[T]],PROVIDER:[TOKEN:[[id number]],SCHEDULEDAPPT:[08/10/2022],SCHEDULEDAPPTTIME:[09:30 AM],ESTABLISHEDPATIENT:[T]]

## 2022-07-19 NOTE — PROGRESS NOTE ADULT - PROBLEM SELECTOR PLAN 3
Resolved. Patient recently admitted for CHF. Last dose of lasix: 7/13. Since discharge, significantly decreased fluid intake. In ED: BUN: 60; Cr: 2.03  - Resolved with BUN 23; Cr 0.79  - most likely prerenal 2/2 to reduced PO intake and in the setting of lasix use   - unremarkable UA   - encouraged increased PO fluid intake   - Will restart lasix but continuing to hold losartan Resolved. Patient recently admitted for CHF. Last dose of lasix: 7/13. Since discharge, significantly decreased fluid intake. In ED: BUN: 60; Cr: 2.03  - Resolved with BUN 23; Cr 0.79 (7/18)  - most likely prerenal 2/2 to reduced PO intake and in the setting of lasix use   - unremarkable UA   - encouraged increased PO fluid intake   - Restarted lasix but continuing to hold losartan Resolved. Patient recently admitted for CHF. Since discharge, significantly decreased fluid intake. In ED: BUN: 60; Cr: 2.03  - most likely prerenal 2/2 to reduced PO intake and in the setting of lasix use   - unremarkable UA   - encouraged increased PO fluid intake   - Restarted lasix but continuing to hold losartan

## 2022-07-19 NOTE — PROGRESS NOTE ADULT - PROBLEM SELECTOR PLAN 10
platelets: 140 at admission and consistency low  most likely reactive   - continue to trend with CBC

## 2022-07-19 NOTE — PROGRESS NOTE ADULT - SUBJECTIVE AND OBJECTIVE BOX
Physical Medicine and Rehabilitation Progress Note :    Patient is a 86y old  Female who presents with a chief complaint of HERNESTO and pain management (19 Jul 2022 06:16)      HPI:  Patient is an 86 F with PMH HFpEF (last ECHO 7/9) , HTN, RA, hypothyroidism, and sacral decubitus ulcer who presented to ED w atraumatic back pain. Pt reports pain occurred after quick movement following a nap yesterday. Patient reports feeling a sharp stabbing pain over b/l lower back. Pain does not radiate. Pain is worse with movement, however feels a dully, aching pain at rest. Patient has never felt a pain like this before. Patient denies any weight changes, fever/chills, nausea, vomiting, diaphoresis, headache, sore throat, chest pain, palpitations, LE edema, dyspnea, cough, wheezing, changes in appetite, constipation, diarrhea, abdominal pain, burning on urination, urinary frequency, dizziness, or fainting/LOC.   Patient reports mother with breast cancer and father with metastatic colon cancer.     Of note, pt was [facility] to [facility] from 7/9-7/12 for acute hypoxic respiratory failure 2/2 CHF exacerbation    In ED: T: 98.5; HR: 78; BP: 106/52; RR: 18; 97% on 2L NC  Labs: WBC: 6.43 with 11% bandemia (neut); RBC: 3.26; Hgb: 10.4; Hct: 31.5; RDW: 20.7; PLT: 140; BUN: 60; Cr: 2.03 (baseline 0.9); GFR: 24; CRP: 21; lactate: 1.1  UA: negative; RVP: negative   Imaging:   CT abd/pelvis: 1. Large complex cystic mass in the upper abdomen measuring up to 12.1 cm which appears to arise from the body of the pancreas. Further characterization   with nonemergent abdominal MRI (pancreatic protocol, without and with contrast) is recommended.  2. Incidental 6 mm solid left lower lobe nodule. See follow up recommendations below.  3. Sigmoid diverticulosis without evidence of diverticulitis.  CT lumbar spine: 1. No acute abnormality in the lumbar spine.  2. Advanced lumbar spondylosis, most severe at L4-L5 with grade 1 anterolisthesis contributing to spinal stenosis; more moderate spinal stenosis at L1-2 from posterior osteophyte ridge; see additional  detail above. Also, spinal stenosis at T9-10 with ventral calcified focus that may be calcified disc extrusion or possible meningioma.             (14 Jul 2022 12:14)                            10.5   6.23  )-----------( 142      ( 19 Jul 2022 08:51 )             32.8       07-19    136  |  95<L>  |  19  ----------------------------<  101<H>  3.6   |  31  |  0.70    Ca    8.6      19 Jul 2022 08:51  Phos  2.9     07-19  Mg     1.9     07-19    TPro  6.7  /  Alb  3.3  /  TBili  0.5  /  DBili  x   /  AST  51<H>  /  ALT  13  /  AlkPhos  58  07-18    Vital Signs Last 24 Hrs  T(C): 36.6 (19 Jul 2022 11:52), Max: 37.1 (18 Jul 2022 20:45)  T(F): 97.9 (19 Jul 2022 11:52), Max: 98.8 (18 Jul 2022 20:45)  HR: 68 (19 Jul 2022 11:52) (68 - 72)  BP: 104/66 (19 Jul 2022 11:52) (104/66 - 134/75)  BP(mean): --  RR: 18 (19 Jul 2022 11:52) (18 - 19)  SpO2: 98% (19 Jul 2022 11:52) (95% - 98%)    Parameters below as of 19 Jul 2022 11:52  Patient On (Oxygen Delivery Method): nasal cannula  O2 Flow (L/min): 2      MEDICATIONS  (STANDING):  acetaminophen     Tablet .. 650 milliGRAM(s) Oral every 6 hours  ascorbic acid 500 milliGRAM(s) Oral daily  ceFAZolin   IVPB      ceFAZolin   IVPB 2000 milliGRAM(s) IV Intermittent every 8 hours  enoxaparin Injectable 40 milliGRAM(s) SubCutaneous every 12 hours  furosemide    Tablet 20 milliGRAM(s) Oral <User Schedule>  levothyroxine 137 MICROGram(s) Oral every 24 hours  lidocaine   4% Patch 1 Patch Transdermal daily  methocarbamol 500 milliGRAM(s) Oral every 8 hours  metoprolol succinate ER 12.5 milliGRAM(s) Oral every 24 hours  multivitamin 1 Tablet(s) Oral daily  nystatin Powder 1 Application(s) Topical two times a day  oxyCODONE    IR 5 milliGRAM(s) Oral every 4 hours  polyethylene glycol 3350 17 Gram(s) Oral every 24 hours  pregabalin 50 milliGRAM(s) Oral three times a day  senna 1 Tablet(s) Oral every 24 hours  zinc sulfate 220 milliGRAM(s) Oral daily    MEDICATIONS  (PRN):  HYDROmorphone  Injectable 1 milliGRAM(s) IV Push once PRN procedure  HYDROmorphone  Injectable 1 milliGRAM(s) IV Push once PRN procedure    Currently Undergoing Physical/ Occupational Therapy at bedside    Initial PT/OT Functional Status Assessment :         Previous Level of Function:     · Ambulation Skills	independent; needs device  · Transfer Skills	independent; needs device  · ADL Skills	independent; needs device  · Work/Leisure Activity	independent; needs device  · Additional Comments	Pt states that she lives alone in an elevator access apartment with no steps to enter. Pt reports to be able to furnture surf in the home and uses a SC to ambulate in the community.    Cognitive Status Examination:   · Orientation	oriented to person, place, time and situation  · Level of Consciousness	alert  · Follows Commands and Answers Questions	100% of the time    Range of Motion Exam:   · Range of Motion Examination	bilateral lower extremity ROM was WFL (within functional limits)    Manual Muscle Testing:   · Manual Muscle Testing Results	grossly assessed due to  3/5 BL UE & LE based on functional mobility.    Bed Mobility: Rolling/Turning:     · Level of Tate	maximum assist (25% patients effort)  · Physical Assist/Nonphysical Assist	2 person assist  · Assistive Device	bed rails    Bed Mobility: Scooting/Bridging:     · Level of Tate	maximum assist (25% patients effort)  · Physical Assist/Nonphysical Assist	2 person assist  · Assistive Device	bed rails    Bed Mobility: Sit to Supine:     · Level of Tate	maximum assist (25% patients effort)  · Physical Assist/Nonphysical Assist	2 person assist  · Assistive Device	bed rails    Bed Mobility: Supine to Sit:     · Level of Tate	maximum assist (25% patients effort)  · Physical Assist/Nonphysical Assist	2 person assist  · Assistive Device	bed rails    Bed Mobility Analysis:     · Bed Mobility Limitations	decreased ability to use legs for bridging/pushing; impaired ability to control trunk for mobility  · Impairments Contributing to Impaired Bed Mobility	impaired balance; pain; impaired postural control; decreased strength; decreased flexibility; decreased ROM    Balance Skills Assessment:     · Sitting Balance: Static	fair plus    Treatment Location:   · Comments	Supine Therex: Ankle pump, Heel slides, Quad sets, Glute sets .    Clinical Impressions:   · Criteria for Skilled Therapeutic Interventions	impairments found; rehab potential; anticipated equipment needs at discharge; functional limitations in following categories; therapy frequency; predicted duration of therapy intervention; anticipated discharge recommendation  · Impairments Found (describe specific impairments)	aerobic capacity/endurance; gait, locomotion, and balance; muscle strength; posture; ROM  · Functional Limitations in Following Categories (describe specific limitations)	self-care; home management; community/leisure  · Rehab Potential	good, to achieve stated therapy goals  · Therapy Frequency	2-3x/week  · Anticipated Equipment Needs at Discharge	rolling walker (5 inch wheels)        PM&R Impression : as above    Current Disposition Plan Recommendations :    subacute rehab placement

## 2022-07-19 NOTE — PROGRESS NOTE ADULT - PROBLEM SELECTOR PLAN 2
admission bloodwork showed no leukocytosis (WBC: 6.43) with bandemia 11%. Patient was afebrile, stable vitals with no sx of active infection.   - Bcx positive for Staph lungdunensis (7/14)  - Bcx from 7/16 and 7/17 showed no growth   - ID recs: Gallium scan because of unclear source  - Continuing IV cefazolin (D2) as per ID recs (transitioned from vanc)  - UA negative for UTI, RVP negative; no focal consolidations on CXR from a few days ago (past admission)  - continue to trend CBC w diff  - f/u ECHO to rule out endocarditis in the setting of bacteremia admission bloodwork showed no leukocytosis (WBC: 6.43) with bandemia 11%. Patient was afebrile, stable vitals with no sx of active infection.   - Bcx positive for Staph lungdunensis (7/14)  - Bcx from 7/16 and 7/17 showed no growth   - ID recs: Gallium scan because of unclear source  - Continuing IV cefazolin (D4) as per ID recs (transitioned from vanc)  - UA negative for UTI, RVP negative; no focal consolidations on CXR from a few days ago (past admission)  - continue to trend CBC w diff  - ECHO showed no evidence of endocarditis admission bloodwork showed no leukocytosis (WBC: 6.43) with bandemia 11%. Patient was afebrile, stable vitals with no sx of active infection.   - Bcx positive for Staph lungdunensis (7/14)  - Bcx from 7/16 and 7/17 showed no growth   - ID recs: Gallium scan ?source  - Continuing IV cefazolin (D4) as per ID recs (transitioned from vanc)  - UA negative for UTI, RVP negative; no focal consolidations on CXR (recent admission)  - TTE showed no evidence of endocarditis

## 2022-07-19 NOTE — DISCHARGE NOTE PROVIDER - HOSPITAL COURSE
#Discharge: do not delete    85 yo female with morbid obesity, recent admission for HFpEF presented with severe acute low back pain, found to have S. lugdunensis bacteremia i/s/o MRI showing degenerative disc disease, spinal stenosis, and T9-10 herniation with cord compression; no overt findings of osteomyelitis or discitis on MRI.     Problem List/Main Diagnoses (system-based):   Inpatient treatment course:   Patient was discharged to:  New medications:   Changes to old medications:   Medications that were stopped:  Items to Follow up as outpatient   Physical exam at time of discharge: #Discharge: do not delete    87 yo female with morbid obesity, recent admission for HFpEF presented with severe acute low back pain, found to have S. lugdunensis bacteremia i/s/o MRI showing degenerative disc disease, spinal stenosis, and T9-10 herniation with cord compression; no overt findings of osteomyelitis or discitis on MRI.     Problem List/Main Diagnoses (system-based):   #Acute back pain.   CT followed by MRI of Lumbar and Thoracic spine with no contrast: T9/10 with partially calcified extruded disc extending superiorly and compressing spinal cord as well as multilevel degenerative disc disease. Neurosurg said no surgery. For pain control, received lidocaine patch daily, robaxin 500 mg q8h, Tylenol 650 mg q6h, oxycodone 5 mg q6h, pregabalin 50 mg 3x daily    #Gram-positive bacteremia.   Admission bloodwork showed no leukocytosis (WBC: 6.43) with bandemia 11%. Patient was afebrile, stable vitals with no sx of active infection. Bcx positive for Staph lungdunensis (7/14). Started on IV vanc and then transitioned to IV cefazolin. Surveillance Bcx from 7/16 and 7/17 showed no growth. ID rec: Gallium scan to find source: UA, RVP, CXR negative for infection. TTE showed no evidence of endocarditis.     #Acute respiratory failure with hypoxia.   ·  Plan: SpO2 drops to 85% on RA  - O2 raised from 2 to 3 L NC due to SpO2 drop overnight  - Do CXR to investigate HF investigation.     Problem/Plan - 4:  ·  Problem: Pressure ulcer.   ·  Plan: Pt with known pressure ulcer on sacrum   - stage 2   - c/w wound care recs.     Problem/Plan - 5:  ·  Problem: (HFpEF) heart failure with preserved ejection fraction.   ·  Plan: Patient on Lasix 20 mg PO M/W/F and losartan 25 mg qd; Toprol 12.5 qd at home  - recent admission to [facility] due to HF exacerbation   - c/w home meds toprol  - Restarted lasix and continued to hold losartan in the setting of resolved HERNESTO and bacteremia  - Chest xray to check for HF exacerbation  - Checking daily weights  - DASH/TLC diet  - f/u outpatient HF team  - Echo showed evidence of mild LVH w/ EF of 50%, Grade I LV diastolic dysfunction and mildly dilated LA.     Problem/Plan - 6:  ·  Problem: Anemia.   ·  Plan: Hgb: 9.8 with RDW 19.2, ferritin 793  Consistent with anemia of chronic disease with unknown source (possibly HF, obesity)  - monitor CBC daily.       Problem/Plan - 7:  ·  Problem: HERNESTO (acute kidney injury).   ·  Plan: Resolved. Patient recently admitted for CHF. Since discharge, significantly decreased fluid intake. In ED: BUN: 60; Cr: 2.03  - most likely prerenal 2/2 to reduced PO intake and in the setting of lasix use   - unremarkable UA   - encouraged increased PO fluid intake   - Restarted lasix but continuing to hold losartan.     Problem/Plan - 8:  ·  Problem: Intertrigo.   ·  Plan: Erythematous scaly patches present diffusely in skin folds  - Treated w/ nystatin powder.     Problem/Plan - 9:  ·  Problem: Pancreatic mass.   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 Pt reports not having follow up in years.   - CEA and  not elevated  -f/up outpatient for abdominal MRI (pancreatic protocol, without and with contrast).     Problem/Plan - 10:  ·  Problem: Thrombocytopenia.   ·  Plan; platelets: 140 at admission and consistently low  most likely reactive   - continue to trend with CBC.     Problem/Plan - 11:  ·  Problem: Pulmonary nodule.   ·  Plan: -6 mm solid L lower lobe nodule; no hx smoking   -fup CT chest at 6-12 mos outpatient.     Problem/Plan - 12:  ·  Problem: Morbid obesity.   ·  Plan: Patient has BMI of 48.3  - Nutrition recs followed.    Inpatient treatment course:   Patient was discharged to:  New medications:   Changes to old medications:   Medications that were stopped:  Items to Follow up as outpatient   Physical exam at time of discharge: #Discharge: do not delete    85 yo female with morbid obesity, recent admission for HFpEF presented with severe acute low back pain, found to have S. lugdunensis bacteremia i/s/o MRI showing degenerative disc disease, spinal stenosis, and T9-10 herniation with cord compression; no overt findings of osteomyelitis or discitis on MRI.     Problem List/Main Diagnoses (system-based):   #Acute back pain.   CT followed by MRI of Lumbar and Thoracic spine with no contrast: T9/10 with partially calcified extruded disc extending superiorly and compressing spinal cord as well as multilevel degenerative disc disease. Neurosurg said no surgery. For pain control, received lidocaine patch daily, robaxin 500 mg q8h, Tylenol 650 mg q6h, oxycodone 5 mg q6h, pregabalin 50 mg 3x daily    #Gram-positive bacteremia.   Admission bloodwork showed no leukocytosis (WBC: 6.43) with bandemia 11%. Patient was afebrile, stable vitals with no sx of active infection. Bcx positive for Staph lungdunensis (7/14). Started on IV vanc and then transitioned to IV cefazolin. Surveillance Bcx from 7/16 and 7/17 showed no growth. ID rec: Gallium scan to find source: UA, RVP, CXR negative for infection. TTE showed no evidence of endocarditis.     #Acute respiratory failure with hypoxia: SpO2 drops to 85% on RA. 2 then 3 L NC used. CXR neg for pulmonary edema.    #Pressure ulcer.   Pt with stage 2 known pressure ulcer on sacrum. Followed wound care recs.    #(HFpEF) heart failure with preserved ejection fraction.   Home meds: Lasix 20 mg PO M/W/F, losartan 25 mg qd, Toprol 12.5 qd after recent admission to [facility] for HF exacerbation. Toprol continued. Lasix initially held i/s/o HERNESTO then restarted. Losartan held. Echo showed evidence of mild LVH w/ EF of 50%, Grade I LV diastolic dysfunction and mildly dilated LA.  - Continue DASH/TLC diet  - f/u outpatient HF team      #Anemia.   ·  Plan: Hgb: 9.8 with RDW 19.2, ferritin 793  Consistent with anemia of chronic disease with unknown source (possibly HF, obesity)  - monitor CBC daily.       Problem/Plan - 7:  ·  Problem: HERNESTO (acute kidney injury).   ·  Plan: Resolved. Patient recently admitted for CHF. Since discharge, significantly decreased fluid intake. In ED: BUN: 60; Cr: 2.03  - most likely prerenal 2/2 to reduced PO intake and in the setting of lasix use   - unremarkable UA   - encouraged increased PO fluid intake   - Restarted lasix but continuing to hold losartan.     Problem/Plan - 8:  ·  Problem: Intertrigo.   ·  Plan: Erythematous scaly patches present diffusely in skin folds  - Treated w/ nystatin powder.     Problem/Plan - 9:  ·  Problem: Pancreatic mass.   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 Pt reports not having follow up in years.   - CEA and  not elevated  -f/up outpatient for abdominal MRI (pancreatic protocol, without and with contrast).     Problem/Plan - 10:  ·  Problem: Thrombocytopenia.   ·  Plan; platelets: 140 at admission and consistently low  most likely reactive   - continue to trend with CBC.     Problem/Plan - 11:  ·  Problem: Pulmonary nodule.   ·  Plan: -6 mm solid L lower lobe nodule; no hx smoking   -fup CT chest at 6-12 mos outpatient.     Problem/Plan - 12:  ·  Problem: Morbid obesity.   ·  Plan: Patient has BMI of 48.3  - Nutrition recs followed.    Inpatient treatment course:   Patient was discharged to:  New medications:   Changes to old medications:   Medications that were stopped:  Items to Follow up as outpatient   Physical exam at time of discharge: #Discharge: do not delete    85 yo female with morbid obesity, recent admission for HFpEF presented with severe acute low back pain, found to have S. lugdunensis bacteremia i/s/o MRI showing degenerative disc disease, spinal stenosis, and T9-10 herniation with cord compression; no overt findings of osteomyelitis or discitis on MRI.     Problem List/Main Diagnoses (system-based):   #Acute back pain.   CT followed by MRI of Lumbar and Thoracic spine with no contrast: T9/10 with partially calcified extruded disc extending superiorly and compressing spinal cord as well as multilevel degenerative disc disease. Neurosurg said no surgery. For pain control, received lidocaine patch daily, robaxin 500 mg q8h, Tylenol 650 mg q6h, oxycodone 5 mg q6h, pregabalin 50 mg 3x daily    #Gram-positive bacteremia.   Admission bloodwork showed no leukocytosis (WBC: 6.43) with bandemia 11%. Patient was afebrile, stable vitals with no sx of active infection. Bcx positive for Staph lungdunensis (7/14). Started on IV vanc and then transitioned to IV cefazolin. Surveillance Bcx from 7/16 and 7/17 showed no growth. ID rec: Gallium scan to find source: UA, RVP, CXR negative for infection. TTE showed no evidence of endocarditis.     #Acute respiratory failure with hypoxia.  SpO2 drops to 85% on RA. 2 then 3 L NC used. CXR neg for pulmonary edema.    #Pressure ulcer.   Pt with stage 2 known pressure ulcer on sacrum. Followed wound care recs.    #(HFpEF) heart failure with preserved ejection fraction.   Home meds: Lasix 20 mg PO M/W/F, losartan 25 mg qd, Toprol 12.5 qd after recent admission to [facility] for HF exacerbation. Toprol continued. Lasix initially held i/s/o HERNESTO then restarted. Losartan held. Echo showed evidence of mild LVH w/ EF of 50%, Grade I LV diastolic dysfunction and mildly dilated LA.  - Continue DASH/TLC diet  - f/u outpatient HF team    #Anemia. Consistent with anemia of chronic disease with unknown source (possibly HF, obesity)  Hgb: went down to 9.8 with RDW 19.2, ferritin 793. Followed CBC daily.    #HERNESTO (acute kidney injury). Resolved.   Patient recently admitted for CHF. In ED: BUN: 60; Cr: 2.03, most likely prerenal i/s/o lasix use and decreased PO intake. Unremarkable UA. Lasix restarted after resolution. Losartan held.     #Intertrigo.   Erythematous scaly patches present diffusely in skin fold. Treated w/ nystatin powder.    #Pancreatic mass.   Large (12 cm) complex cystic mass from body of pancreas found on Abd CT. Pt reports remote history of following pancreatic mass with MR1. Pt reports not having follow up in years. CEA and  not elevated  -f/up outpatient for abdominal MRI (pancreatic protocol, without and with contrast).    #Thrombocytopenia.   platelets: 140 at admission and consistently low. Monitored with CBC. Most likely reactive     #Pulmonary nodule.   ~6 mm solid L lower lobe nodule found on CT; no hx smoking   -fup CT chest at 6-12 mos outpatient.    #Morbid obesity.   BMI of 48.3. Nutrition recs followed.    Inpatient treatment course:   Patient was discharged to:  New medications:   Changes to old medications:   Medications that were stopped:  - Losartan held    Items to Follow up as outpatient:  - HF team  - Pancreatic mass: F/u with abdominal MRI  - Pulmonary nodule: F/u Chest CT in 6-12 months    Physical exam at time of discharge: #Discharge: do not delete    85 yo female with morbid obesity, recent admission for HFpEF presented with severe acute low back pain, found to have S. lugdunensis bacteremia i/s/o MRI showing degenerative disc disease, spinal stenosis, and T9-10 herniation with cord compression; no overt findings of osteomyelitis or discitis on MRI.     Problem List/Main Diagnoses (system-based):   #Acute back pain.   CT followed by MRI of Lumbar and Thoracic spine with no contrast: T9/10 with partially calcified extruded disc extending superiorly and compressing spinal cord as well as multilevel degenerative disc disease. Neurosurg said no surgery. For pain control, received lidocaine patch daily, robaxin 500 mg q8h, Tylenol 650 mg q6h, oxycodone 5 mg q6h, pregabalin 50 mg 3x daily    #Gram-positive bacteremia.   Admission bloodwork showed no leukocytosis (WBC: 6.43) with bandemia 11%. Patient was afebrile, stable vitals with no sx of active infection. Bcx positive for Staph lungdunensis (7/14). Started on IV vanc and then transitioned to IV cefazolin. Surveillance Bcx from 7/16 and 7/17 showed no growth. ID rec: Gallium scan to find source: UA, RVP, CXR negative for infection. TTE showed no evidence of endocarditis.     #Acute respiratory failure with hypoxia.  SpO2 drops to 85% on RA. 2 then 3 L NC used. CXR neg for pulmonary edema.    #Pressure ulcer.   Pt with stage 2 known pressure ulcer on sacrum. Followed wound care recs.    #(HFpEF) heart failure with preserved ejection fraction.   Home meds: Lasix 20 mg PO M/W/F, losartan 25 mg qd, Toprol 12.5 qd after recent admission to [facility] for HF exacerbation. Toprol continued. Lasix initially held i/s/o HERNESTO then restarted. Losartan held. Echo showed evidence of mild LVH w/ EF of 50%, Grade I LV diastolic dysfunction and mildly dilated LA.  - Continue DASH/TLC diet  - f/u outpatient HF team    #Anemia. Consistent with anemia of chronic disease with unknown source (possibly HF, obesity)  Hgb: went down to 9.8 with RDW 19.2, ferritin 793. Followed CBC daily.    #HERNESTO (acute kidney injury). Resolved.   Patient recently admitted for CHF. In ED: BUN: 60; Cr: 2.03, most likely prerenal i/s/o lasix use and decreased PO intake. Unremarkable UA. Lasix restarted after resolution. Losartan held.     #Intertrigo.   Erythematous scaly patches present diffusely in skin fold. Treated w/ nystatin powder.    #Pancreatic mass.   Large (12 cm) complex cystic mass from body of pancreas found on Abd CT. Pt reports remote history of following pancreatic mass with MR1. Pt reports not having follow up in years. CEA and  not elevated  -f/up outpatient for abdominal MRI (pancreatic protocol, without and with contrast).    #Thrombocytopenia.   platelets: 140 at admission and consistently low. Monitored with CBC. Most likely reactive     #Pulmonary nodule.   ~6 mm solid L lower lobe nodule found on CT; no hx smoking   -fup CT chest at 6-12 mos outpatient.    #Morbid obesity.   BMI of 48.3. Nutrition recs followed.    Inpatient treatment course:   Patient was discharged to:  New medications:   Changes to old medications:   Medications that were stopped:  - Losartan held    Items to Follow up as outpatient:  - HF team  - Pancreatic mass: F/u with abdominal MRI  - Pulmonary nodule: F/u Chest CT in 6-12 months    Physical exam at time of discharge: #Discharge: do not delete    85 yo female with morbid obesity, recent admission for HFpEF presented with severe acute low back pain, found to have S. lugdunensis bacteremia i/s/o MRI showing degenerative disc disease, spinal stenosis, and T9-10 herniation with cord compression; Gallium scan showed signs of osteomyelitis at T12, L3 and the right proximal psoas.    Problem List/Main Diagnoses (system-based):   #Acute back pain.   CT followed by MRI of Lumbar and Thoracic spine with no contrast: T9/10 with partially calcified extruded disc extending superiorly and compressing spinal cord as well as multilevel degenerative disc disease. Neurosurg said no surgery. Gallium scan showed signs of osteomyelitis at T12, L3 and the right proximal psoas. For pain control, received lidocaine patch daily, robaxin 500 mg q8h, Tylenol 650 mg q6h, oxycodone 5 mg q6h, pregabalin 50 mg 3x daily    #Gram-positive bacteremia.   Admission bloodwork showed no leukocytosis (WBC: 6.43) with bandemia 11%. Patient was afebrile, stable vitals with no sx of active infection. Bcx positive for Staph lungdunensis (7/14). Started on IV vanc and then transitioned to IV cefazolin. Surveillance Bcx from 7/16 and 7/17 showed no growth. ID rec: Gallium scan to find source: UA, RVP, CXR negative for infection. TTE showed no evidence of endocarditis.     #Acute respiratory failure with hypoxia.  SpO2 drops to 85% on RA. 2 then 3 L NC used. CXR neg for pulmonary edema.    #Pressure ulcer.   Pt with stage 2 known pressure ulcer on sacrum. Followed wound care recs.    #(HFpEF) heart failure with preserved ejection fraction.   Home meds: Lasix 20 mg PO M/W/F, losartan 25 mg qd, Toprol 12.5 qd after recent admission to [facility] for HF exacerbation. Toprol continued. Lasix initially held i/s/o HERNESTO then restarted. Losartan held. Echo showed evidence of mild LVH w/ EF of 50%, Grade I LV diastolic dysfunction and mildly dilated LA.  - Continue DASH/TLC diet  - f/u outpatient HF team    #Anemia. Consistent with anemia of chronic disease with unknown source (possibly HF, obesity)  Hgb: went down to 9.8 with RDW 19.2, ferritin 793. Followed CBC daily.    #HERNESTO (acute kidney injury). Resolved.   Patient recently admitted for CHF. In ED: BUN: 60; Cr: 2.03, most likely prerenal i/s/o lasix use and decreased PO intake. Unremarkable UA. Lasix restarted after resolution. Losartan held.     #Intertrigo.   Erythematous scaly patches present diffusely in skin fold. Treated w/ nystatin powder.    #Pancreatic mass.   Large (12 cm) complex cystic mass from body of pancreas found on Abd CT. Pt reports remote history of following pancreatic mass with MR1. Pt reports not having follow up in years. CEA and  not elevated  -f/up outpatient for abdominal MRI (pancreatic protocol, without and with contrast).    #Thrombocytopenia.   platelets: 140 at admission and consistently low. Monitored with CBC. Most likely reactive     #Pulmonary nodule.   ~6 mm solid L lower lobe nodule found on CT; no hx smoking   -fup CT chest at 6-12 mos outpatient.    #Morbid obesity.   BMI of 48.3. Nutrition recs followed.    Inpatient treatment course:   Patient was discharged to:  New medications:   Changes to old medications:   Medications that were stopped:  - Losartan held    Items to Follow up as outpatient:  - HF team  - Pancreatic mass: F/u with abdominal MRI  - Pulmonary nodule: F/u Chest CT in 6-12 months    Physical exam at time of discharge: #Discharge: do not delete    85 yo female with morbid obesity, recent admission for HFpEF presented with severe acute low back pain, found to have S. lugdunensis bacteremia i/s/o MRI showing degenerative disc disease, spinal stenosis, and T9-10 herniation with cord compression; Gallium scan showed signs of osteomyelitis at T12, L3 and the right proximal psoas.    Problem List/Main Diagnoses (system-based):   #Acute back pain.   CT followed by MRI of Lumbar and Thoracic spine with no contrast: T9/10 with partially calcified extruded disc extending superiorly and compressing spinal cord as well as multilevel degenerative disc disease. Neurosurg said no surgery. Gallium scan showed signs of osteomyelitis at T12, L3 and the right proximal psoas. For pain control, received lidocaine patch daily, robaxin 500 mg q8h, Tylenol 650 mg q6h, oxycodone 5 mg q6h, pregabalin 50 mg 3x daily    #Gram-positive bacteremia.   Admission bloodwork showed no leukocytosis (WBC: 6.43) with bandemia 11%. Patient was afebrile, stable vitals with no sx of active infection. Bcx positive for Staph lungdunensis (7/14). Started on IV vanc and then transitioned to IV cefazolin. Surveillance Bcx from 7/16 and 7/17 showed no growth. ID rec: Gallium scan to find source: UA, RVP, CXR negative for infection. TTE showed no evidence of endocarditis.     #Acute respiratory failure with hypoxia.  SpO2 drops to 85% on RA. 2 then 3 L NC used. CXR neg for pulmonary edema.    #Pressure ulcer.   Pt with stage 2 known pressure ulcer on sacrum. Followed wound care recs.    #(HFpEF) heart failure with preserved ejection fraction.   Home meds: Lasix 20 mg PO M/W/F, losartan 25 mg qd, Toprol 12.5 qd after recent admission to [facility] for HF exacerbation. Toprol continued. Lasix initially held i/s/o HERNESTO then restarted. Losartan held. Echo showed evidence of mild LVH w/ EF of 50%, Grade I LV diastolic dysfunction and mildly dilated LA.  - Continue DASH/TLC diet  - f/u outpatient HF team    #Anemia. Consistent with anemia of chronic disease with unknown source (possibly HF, obesity)  Hgb: went down to 9.8 with RDW 19.2, ferritin 793. Followed CBC daily.    #HERNESTO (acute kidney injury). Resolved.   Patient recently admitted for CHF. In ED: BUN: 60; Cr: 2.03, most likely prerenal i/s/o lasix use and decreased PO intake. Unremarkable UA. Lasix restarted after resolution. Losartan held.     #Intertrigo.   Erythematous scaly patches present diffusely in skin fold. Treated w/ nystatin powder.    #Pancreatic mass.   Large (12 cm) complex cystic mass from body of pancreas found on Abd CT. Pt reports remote history of following pancreatic mass with MR1. Pt reports not having follow up in years. CEA and  not elevated  -f/up outpatient for abdominal MRI (pancreatic protocol, without and with contrast).    #Thrombocytopenia.   platelets: 140 at admission and consistently low. Monitored with CBC. Most likely reactive     #Pulmonary nodule.   ~6 mm solid L lower lobe nodule found on CT; no hx smoking   -fup CT chest at 6-12 mos outpatient.    #Morbid obesity.   BMI of 48.3. Nutrition recs followed.    Inpatient treatment course:   Patient was discharged to:  New medications:   - IV cefazolin for 6 weeks    Changes to old medications:   Medications that were stopped:  - Losartan held: Restart if sys BP > 140    Items to Follow up as outpatient:  - HF team: Has f/u appt from previous hospital admission for CHF exacerbation   - Pancreatic mass: F/u with abdominal MRI  - Pulmonary nodule: F/u Chest CT in 6-12 months    Physical exam at time of discharge:   Constitutional: Patient is awake and alert; Morbidly obese; Appears to be resting comfortably when not moving.  HEENT: EOMI, CN II-XII intact, sclera non-icteric  Respiratory: CTA b/l, no wheezing, no rhonchi, no rales  Cardiovascular: normal S1S2; +3/6 systolic murmur loudest at the right upper sternal border that radiates to the carotids  Gastrointestinal: soft, slight tenderness in left epigastric region, no masses palpable; hyperactive bowel sounds; improving erythematous scaly patches along abdominal folds concerning for intertrigo covered in nystatin powder; healing bruises noted bilaterally in lower abdominal quadrants  Extremities: warm, well perfused; radial and posterior tibial pulses present bilaterally; intertrigo in popliteal fossa bilaterally; LE range of motion limited by pain b/l; no dermatomal sensation deficits noted in UEs or LEs; Reflexes hard to ascertain due to patient position; slightly decreased light touch sensation in toe phalanges   Healing stage 1 sacral decubitus ulcer with no drainage or necrotic tissue #Discharge: do not delete    87 yo female with a recent admission for HFpEF presented with severe acute low back pain, found to have S. lugdunensis bacteremia i/s/o MRI showing T9-10 herniation with cord compression and a Gallium scan showing signs of osteomyelitis at T12, L3 and the right proximal psoas.    Problem List/Main Diagnoses (system-based):   #Acute back pain.   MRI of Lumbar and Thoracic spine with no contrast: T9/10 with extruded disc compressing spinal cord. Gallium scan showed signs of osteomyelitis at T12, L3 and the right proximal psoas. For pain control, received lidocaine patch daily, robaxin 500 mg q8h, Tylenol 650 mg q6h, oxycodone 5 mg q6h, pregabalin 50 mg 3x daily.     #Gram-positive bacteremia.   Bcx positive for Staph lungdunensis (7/14). Started on IV vanc and then transitioned to IV cefazolin. Surveillance Bcx from 7/16 and 7/17 showed no growth. Gallium scan/MRI indicated T12, L3 osteomyelitis as a probable source.     #Acute respiratory failure with hypoxia.  SpO2 dropped to 85% on RA. 2LNC O2 used. CXR neg for pulmonary edema or any other acute opacity.     #Pressure ulcer.   Pt with stage 2 known pressure ulcer on sacrum. Followed wound care recs and ulcer healed to stage 1.    #(HFpEF) heart failure with preserved ejection fraction.   Home meds: Lasix 20 mg PO M/W/F, losartan 25 mg qd, Toprol 12.5 qd after recent admission to [facility] for HF exacerbation. Toprol continued. Lasix initially held i/s/o HERNESTO then restarted. Losartan held. Echo w/ no interval change demonstrated evidence of mild LVH w/ EF of 50%, Grade I LV diastolic dysfunction and mildly dilated LA.  - f/u outpatient HF team    #Anemia. Consistent with anemia of chronic disease with unknown source (possibly HF, obesity)  Hgb 10 on admission with RDW 19.2, ferritin 793. Followed CBC daily with no acute reduction in Hgb.    #HERNESTO (acute kidney injury). Resolved.   Patient recently admitted for CHF. In ED: BUN: 60; Cr: 2.03, most likely prerenal i/s/o lasix use and decreased PO intake. Unremarkable UA. Lasix restarted after resolution. Losartan held.     #Intertrigo.   Erythematous scaly patches present diffusely in skin fold. Treated w/ nystatin powder.    #Pancreatic mass.   Large (12 cm) complex cystic mass from body of pancreas found on CT AP. CEA and  not elevated  -f/up outpatient for abdominal MRI (pancreatic protocol, w/ and w/o contrast).    #Thrombocytopenia.   platelets: 140 at admission and consistently low throughout admission. Most likely reactive due to bacteremia.    #Pulmonary nodule.   ~6 mm solid L lower lobe nodule found on CT; no hx smoking   -fup CT chest at 6-12 mos outpatient.    #BMI 48.3  Nutrition recs followed.    Patient was discharged to: Acute rehab  New medications:   - IV cefazolin 2g q8h for 6 weeks    Changes to old medications:   Medications that were stopped:  - Losartan held: Please restart if sys BP > 140    Items to Follow up as outpatient:  - HF team: Has f/u appt from previous hospital admission for CHF exacerbation   - Pancreatic mass: F/u with abdominal MRI  - Pulmonary nodule: F/u Chest CT in 6-12 months    Physical exam at time of discharge:   Constitutional: Patient is awake and alert; Morbidly obese; Appears to be resting comfortably when not moving.  HEENT: EOMI, CN II-XII intact, sclera non-icteric  Respiratory: CTA b/l, no wheezing, no rhonchi, no rales  Cardiovascular: normal S1S2; +3/6 systolic murmur loudest at the right upper sternal border that radiates to the carotids  Gastrointestinal: soft, slight tenderness in left epigastric region, no masses palpable; hyperactive bowel sounds; improving erythematous scaly patches along abdominal folds concerning for intertrigo covered in nystatin powder; healing bruises noted bilaterally in lower abdominal quadrants  Extremities: warm, well perfused; radial and posterior tibial pulses present bilaterally; intertrigo in popliteal fossa bilaterally; LE range of motion limited by pain b/l; no dermatomal sensation deficits noted in UEs or LEs; Reflexes hard to ascertain due to patient position; slightly decreased light touch sensation in toe phalanges   Healing stage 1 sacral decubitus ulcer with no drainage or necrotic tissue #Discharge: do not delete    85 yo female with a recent admission for HFpEF presented with severe acute low back pain, found to have S. lugdunensis bacteremia i/s/o MRI showing T9-10 herniation with cord compression and a Gallium scan showing signs of osteomyelitis at T12, L3 and the right proximal psoas.    Problem List/Main Diagnoses (system-based):     # Osteomyelitis   MRI of Lumbar and Thoracic spine with no contrast: T9/10 with extruded disc compressing spinal cord. Gallium scan showed signs of osteomyelitis at T12, L3 and the right proximal psoas. For pain control, received lidocaine patch daily, robaxin 500 mg q8h, Tylenol 650 mg q6h, oxycodone 5 mg q6h, pregabalin 50 mg 3x daily.   1.  Continue Cefazolin 2 grams IV q8hrs x 6 weeks total (7/16/22-8/27/22)  3.  Needs weekly CBC, CMP, ESR, CRP.  Fax to Dr. Nicholson:  [phone]  4.  Can follow up with Dr. Nicholson:  [address] [place] [address], 4th floor [phone], option 2.  An appointment has been made for Monday August 1st at 3 p    # Acute Back Pain  Due to osteomyelitis.  Pain management was consulted and made the following recommendations.   Recommended Treatment PLAN:  1. Consider continuing Oxycodone 7.5 mg PO q4h PRN severe pain. If fails and patient able to tolerate well, consider escalating to Oxycodone 10 mg PO q4h PRN severe pain  2. Consider continuing Dilaudid 0.25 mg IVP q6h PRN breakthrough pain  3. Consider continuing Tylenol 650 mg PO every 6 hours  4. Consider continuing Methocarbamol 500 mg PO TID  5. Consider continuing Lyrica 50 mg PO TID  6. Consider continuing daily lidocaine patch to low back  HOLD ALL OPIOIDS FOR SEDATION, RR<10, O2SAT <93%, SBP <96    #Gram-positive bacteremia.   Bcx positive for Staph lungdunensis (7/14). Started on IV vanc and then transitioned to IV cefazolin. Surveillance Bcx from 7/16 and 7/17 showed no growth. Gallium scan/MRI indicated T12, L3 osteomyelitis as a probable source.   - See plan for osteomyelitis above.    #Acute respiratory failure with hypoxia.  SpO2 dropped to 85% on RA, improved to>92% on 2L NC O2.  CXR neg for pulmonary edema or any other acute opacity.  Etiology may be in setting of pain.   - Continue supplemental oxygen as needed, wean as tolerated    #Pressure ulcer.   Pt with stage 2 known pressure ulcer on sacrum, healed stage 1.    #(HFpEF) heart failure with preserved ejection fraction.   Home meds: Lasix 20 mg PO M/W/F, losartan 25 mg qd, Toprol 12.5 qd after recent admission to [facility] for HF exacerbation. Toprol continued. Lasix initially held i/s/o HERNESTO then restarted. Losartan held. Echo w/ no interval change demonstrated evidence of mild LVH w/ EF of 50%, Grade I LV diastolic dysfunction and mildly dilated LA.  - f/u outpatient HF team    #Anemia. Consistent with anemia of chronic disease with unknown source (possibly HF, obesity)  Hgb 10 on admission with RDW 19.2, ferritin 793. Followed CBC daily with no acute reduction in Hgb.    #HERNESTO (acute kidney injury). Resolved.   Patient recently admitted for CHF. In ED: BUN: 60; Cr: 2.03, most likely prerenal i/s/o lasix use and decreased PO intake. Unremarkable UA. Lasix restarted after resolution. Losartan held.     #Intertrigo.   Erythematous scaly patches present diffusely in skin fold. Treated w/ nystatin powder.    #Pancreatic mass.   Large (12 cm) complex cystic mass from body of pancreas found on CT AP. CEA and  not elevated  -f/up outpatient for abdominal MRI (pancreatic protocol, w/ and w/o contrast).    #Thrombocytopenia.   platelets: 140 at admission and consistently low throughout admission. Most likely reactive due to bacteremia.    #Pulmonary nodule.   ~6 mm solid L lower lobe nodule found on CT; no hx smoking   -fup CT chest at 6-12 mos outpatient.    #BMI 48.3  Nutrition recs followed.    Patient was discharged to: Acute rehab  New medications:   - IV cefazolin 2g q8h for 6 weeks    Changes to old medications:   Medications that were stopped:  - Losartan held: Please restart if sys BP > 140    Items to Follow up as outpatient:  - HF team: Has f/u appt from previous hospital admission for CHF exacerbation   - Pancreatic mass: F/u with abdominal MRI  - Pulmonary nodule: F/u Chest CT in 6-12 months    Physical exam at time of discharge:   Constitutional: Patient is awake and alert; Morbidly obese; Appears to be resting comfortably when not moving.  HEENT: EOMI, CN II-XII intact, sclera non-icteric  Respiratory: CTA b/l, no wheezing, no rhonchi, no rales  Cardiovascular: normal S1S2; +3/6 systolic murmur loudest at the right upper sternal border that radiates to the carotids  Gastrointestinal: soft, slight tenderness in left epigastric region, no masses palpable; hyperactive bowel sounds; improving erythematous scaly patches along abdominal folds concerning for intertrigo covered in nystatin powder; healing bruises noted bilaterally in lower abdominal quadrants  Extremities: warm, well perfused; radial and posterior tibial pulses present bilaterally; intertrigo in popliteal fossa bilaterally; LE range of motion limited by pain b/l; no dermatomal sensation deficits noted in UEs or LEs; Reflexes hard to ascertain due to patient position; slightly decreased light touch sensation in toe phalanges   Healing stage 1 sacral decubitus ulcer with no drainage or necrotic tissue 85 yo female with a recent admission for HFpEF presented with severe acute low back pain, found to have S. lugdunensis bacteremia i/s/o MRI showing T9-10 herniation with cord compression and a Gallium scan showing signs of osteomyelitis at T12, L3 and the right proximal psoas.    Problem List/Main Diagnoses (system-based):   # Osteomyelitis   MRI of Lumbar and Thoracic spine with no contrast: T9/10 with extruded disc compressing spinal cord. Gallium scan showed signs of osteomyelitis at T12, L3 and the right proximal psoas.   1.  Continue Cefazolin 2 grams IV q8hrs x 6 weeks total (7/16/22-8/27/22)  3.  Needs weekly CBC, CMP, ESR, CRP.  Fax to Dr. Nicholson:  [phone]  4.  Can follow up with Dr. Nicholson:  [facility]. [address], [facility] floor [phone], option 2.  An appointment has been made for Monday August 1st at 3 pm    # Acute Back Pain  Due to osteomyelitis.  Pain management was consulted and made the following recommendations.   Recommended Treatment PLAN:  1. Consider continuing Oxycodone 7.5 mg PO q4h PRN severe pain. If fails and patient able to tolerate well, consider escalating to Oxycodone 10 mg PO q4h PRN severe pain  2. Consider continuing Dilaudid 0.25 mg IVP q6h PRN breakthrough pain  3. Consider continuing Tylenol 650 mg PO every 6 hours  4. Consider continuing Methocarbamol 500 mg PO TID  5. Consider continuing Lyrica 50 mg PO TID  6. Consider continuing daily lidocaine patch to low back  HOLD ALL OPIOIDS FOR SEDATION, RR<10, O2SAT <93%, SBP <96    #Gram-positive bacteremia.   Bcx positive for Staph lungdunensis (7/14). Started on IV vanc and then transitioned to IV cefazolin. Surveillance Bcx from 7/16 and 7/17 showed no growth. Gallium scan/MRI indicated T12, L3 osteomyelitis as a probable source.   - See plan for osteomyelitis above.    #Acute respiratory failure with hypoxia.  SpO2 dropped to 85% on RA, improved to>92% on 2L NC O2.  CXR neg for pulmonary edema or any other acute opacity.  Etiology may be in setting of pain.   - Continue supplemental oxygen as needed, wean as tolerated    #Pressure ulcer.   Patient with MASD to intergluteal cleft with blanchable erythema. Skin is eroded at base of cleft. Patient also noted to have ITD, which is being managed with nystatin powder with success; no erythema noted.   Intergluteal cleft MASD: Cleanse with bath wipes. Pat dry. Apply Triad cream and leave BUDDY. Apply twice daily and PRN for soiling. Do NOT try to remove all cream at each cleansing; remove only what is visibly soiled and reapply.  Continue to nystatin powder to manage ITD, or you may use InterDry without cream, lotions, and powders to all folds for up to 3 days. Change when sheet is wet or soiled.    #(HFpEF) heart failure with preserved ejection fraction.   Home meds: Lasix 20 mg PO M/W/F, losartan 25 mg qd, Toprol 12.5 qd after recent admission to [facility] for HF exacerbation. Toprol continued. Lasix initially held i/s/o HERNESTO then restarted. Losartan held. Echo w/ no interval change demonstrated evidence of mild LVH w/ EF of 50%, Grade I LV diastolic dysfunction and mildly dilated LA.  - f/u outpatient HF team    #Anemia. Consistent with anemia of chronic disease with unknown source (possibly HF, obesity)  Hgb 10 on admission with RDW 19.2, ferritin 793. Followed CBC daily with no acute reduction in Hgb.    #HERNESTO (acute kidney injury). Resolved.   Patient recently admitted for CHF. In ED: BUN: 60; Cr: 2.03, most likely prerenal i/s/o lasix use and decreased PO intake. Unremarkable UA. Lasix restarted after resolution. Losartan held.     #Intertrigo.   Erythematous scaly patches present diffusely in skin fold. Treated w/ nystatin powder.    #Pancreatic mass.   Large (12 cm) complex cystic mass from body of pancreas found on CT AP. CEA and  not elevated  -f/up outpatient for abdominal MRI (pancreatic protocol, w/ and w/o contrast).    #Thrombocytopenia.   platelets: 140 at admission and consistently low throughout admission. Most likely reactive due to bacteremia.    #Pulmonary nodule.   ~6 mm solid L lower lobe nodule found on CT; no hx smoking   -fup CT chest at 6-12 mos outpatient.    #BMI 48.3  Nutrition recs followed.    Patient was discharged to: Acute rehab  New medications:   - IV cefazolin 2g q8h for 6 weeks    Changes to old medications:   Medications that were stopped:  - Losartan held: Please restart if sys BP > 140    Items to Follow up as outpatient:  - HF team: Has f/u appt from previous hospital admission for CHF exacerbation   - Pancreatic mass: F/u with abdominal MRI  - Pulmonary nodule: F/u Chest CT in 6-12 months    Physical exam at time of discharge:   Constitutional: Patient is awake and alert; Morbidly obese; Appears to be resting comfortably when not moving.  HEENT: EOMI, CN II-XII intact, sclera non-icteric  Respiratory: CTA b/l, no wheezing, no rhonchi, no rales  Cardiovascular: normal S1S2; +3/6 systolic murmur loudest at the right upper sternal border that radiates to the carotids  Gastrointestinal: soft, slight tenderness in left epigastric region, no masses palpable; hyperactive bowel sounds; improving erythematous scaly patches along abdominal folds concerning for intertrigo covered in nystatin powder; healing bruises noted bilaterally in lower abdominal quadrants  Extremities: warm, well perfused; radial and posterior tibial pulses present bilaterally; intertrigo in popliteal fossa bilaterally; LE range of motion limited by pain b/l; no dermatomal sensation deficits noted in UEs or LEs; Reflexes hard to ascertain due to patient position; slightly decreased light touch sensation in toe phalanges   Healing stage 1 sacral decubitus ulcer with no drainage or necrotic tissue

## 2022-07-19 NOTE — DISCHARGE NOTE PROVIDER - NSDCCPCAREPLAN_GEN_ALL_CORE_FT
PRINCIPAL DISCHARGE DIAGNOSIS  Diagnosis: Intractable back pain  Assessment and Plan of Treatment: 84 percent of adults have low back pain at some time in their lives. We used two different types of imaging (CT and MRI) to understand what was causing your back pain and found evidence of spinal cord compression.   Spinal cord compression is caused by any condition that puts pressure on your spinal cord. Your spinal cord is the bundle of nerves that carries messages back and forth from your brain to your muscles and other soft tissues. As your spinal cord travels down your back, it is protected by a stack of backbones called vertebrae. They also hold your body upright. The nerves of your spinal cord run through the openings between the vertebrae and out to your muscles. Spinal cord compression can occur anywhere from your neck (cervical spine) down to your lower back (lumbar spine). Symptoms include numbness, pain, and weakness.   After speaking with the neurosurgery team, we determined that surgery would not be helpful in your case. Instead we      SECONDARY DISCHARGE DIAGNOSES  Diagnosis: HERNESTO (acute kidney injury)  Assessment and Plan of Treatment:     Diagnosis: Pancreatic mass  Assessment and Plan of Treatment:      PRINCIPAL DISCHARGE DIAGNOSIS  Diagnosis: Intractable back pain  Assessment and Plan of Treatment: 84 percent of adults have low back pain at some time in their lives. We used two different types of imaging (CT and MRI) to understand what was causing your back pain and found evidence of spinal cord compression.   Spinal cord compression is caused by any condition that puts pressure on your spinal cord. Your spinal cord is the bundle of nerves that carries messages back and forth from your brain to your muscles and other soft tissues. As your spinal cord travels down your back, it is protected by a stack of backbones called vertebrae. They also hold your body upright. The nerves of your spinal cord run through the openings between the vertebrae and out to your muscles. Spinal cord compression can occur anywhere from your neck (cervical spine) down to your lower back (lumbar spine). Symptoms include numbness, pain, and weakness.   After speaking with the neurosurgery team, we determined that surgery would not be helpful in your case. Instead we get you medications to help manage your pain and observed you to ensure you did not need emergency surgery.      SECONDARY DISCHARGE DIAGNOSES  Diagnosis: HERNESTO (acute kidney injury)  Assessment and Plan of Treatment:     Diagnosis: Pancreatic mass  Assessment and Plan of Treatment: Pancreatic cysts are diagnosed with increasing frequency because of the widespread use of cross-sectional imaging. Pancreatic cysts may be detected in 40-50% of patients who undergo abdominal imaging for unrelated reasons. The frequency increases with age. There are different types of pancreatic cysts that include inflammatory collections of fluid, non-neoplastic, and neoplastic cysts. You can follow up with an MRI of your abdomen to help determine what type of cyst this is.    Diagnosis: Lung nodule  Assessment and Plan of Treatment: A pulmonary nodule is defined on imaging as a small (=30 mm), well-defined lesion surrounded by lung tissue. Pulmonary nodules may be detected on cross-sectional imaging studies performed for an unrelated reason (ie, incidental pulmonary nodule). You had a ~6 mm solid L lower lobe nodule found on imaging (CT). You can follow up in 6-12 months with a CT scan of your chest.     PRINCIPAL DISCHARGE DIAGNOSIS  Diagnosis: Intractable back pain  Assessment and Plan of Treatment: 84 percent of adults have low back pain at some time in their lives. We used three different types of imaging (CT, MRI and a gallium scan) to understand what was causing your back pain and found evidence of spinal cord compression and osteomyelitis (an infection of some of the bones of the back).   Your spinal cord is the bundle of nerves that carries messages back and forth from your brain to your muscles and other soft tissues. As your spinal cord travels down your back, it is protected by a stack of backbones called vertebrae. They also hold your body upright. The nerves of your spinal cord run through the openings between the vertebrae and out to your muscles.   In osteomyelitis, the backbones can get infected: this infection can start somewhere else in the body and spread to the bone, or it can start in the bone — often as a result of an injury. This infection can cause inflammation and pain and must be treated with IV antibiotics. Sometimes the inflammation can cause compression of the spinal cord leading to numbness, pain, and weakness.   After speaking with the neurosurgery team, we determined that surgery would not be helpful in your case. Instead we got you medications to help manage your pain and observed you to ensure you did not need emergency surgery. We also began your IV antibiotic treatment which you will continue in rehab.      SECONDARY DISCHARGE DIAGNOSES  Diagnosis: HERNESTO (acute kidney injury)  Assessment and Plan of Treatment:     Diagnosis: Pancreatic mass  Assessment and Plan of Treatment: Pancreatic cysts are diagnosed with increasing frequency because of the widespread use of cross-sectional imaging. Pancreatic cysts may be detected in 40-50% of patients who undergo abdominal imaging for unrelated reasons. The frequency increases with age. There are different types of pancreatic cysts that include inflammatory collections of fluid, non-neoplastic, and neoplastic cysts. You can follow up with an MRI of your abdomen to help determine what type of cyst this is.    Diagnosis: Lung nodule  Assessment and Plan of Treatment: A pulmonary nodule is defined on imaging as a small (=30 mm), well-defined lesion surrounded by lung tissue. Pulmonary nodules may be detected on cross-sectional imaging studies performed for an unrelated reason (ie, incidental pulmonary nodule). You had a ~6 mm solid L lower lobe nodule found on imaging (CT). You can follow up in 6-12 months with a CT scan of your chest.     PRINCIPAL DISCHARGE DIAGNOSIS  Diagnosis: Intractable back pain  Assessment and Plan of Treatment: 84 percent of adults have low back pain at some time in their lives. We used three different types of imaging (CT, MRI and a gallium scan) to understand what was causing your back pain and found evidence of spinal cord compression and osteomyelitis (an infection of some of the bones of the back). This infection can cause inflammation and pain and must be treated with IV antibiotics. After speaking with the neurosurgery team, we determined that surgery would not be helpful in your case. Instead we gave you medications to help manage your pain and observed you to ensure you did not need emergency surgery. We also began your IV antibiotic treatment which you will continue in rehab.      SECONDARY DISCHARGE DIAGNOSES  Diagnosis: HERNESTO (acute kidney injury)  Assessment and Plan of Treatment:     Diagnosis: Pancreatic mass  Assessment and Plan of Treatment: Pancreatic cysts are diagnosed with increasing frequency because of the widespread use of cross-sectional imaging. Pancreatic cysts may be detected in 40-50% of patients who undergo abdominal imaging for unrelated reasons. The frequency increases with age. There are different types of pancreatic cysts that include inflammatory collections of fluid, non-neoplastic, and neoplastic cysts. You can follow up with an MRI of your abdomen to help determine what type of cyst this is.    Diagnosis: Lung nodule  Assessment and Plan of Treatment: A pulmonary nodule is defined on imaging as a small (=30 mm), well-defined lesion surrounded by lung tissue. Pulmonary nodules may be detected on cross-sectional imaging studies performed for an unrelated reason (ie, incidental pulmonary nodule). You had a ~6 mm solid L lower lobe nodule found on imaging (CT). You can follow up in 6-12 months with a CT scan of your chest.     PRINCIPAL DISCHARGE DIAGNOSIS  Diagnosis: Acute osteomyelitis  Assessment and Plan of Treatment: MRI of Lumbar and Thoracic spine with no contrast: T9/10 with extruded disc compressing spinal cord. Gallium scan showed signs of osteomyelitis at T12, L3 and the right proximal psoas.   1.  Continue Cefazolin 2 grams IV q8hrs x 6 weeks total (7/16/22-8/27/22)  3.  Needs weekly CBC, CMP, ESR, CRP.  Fax to Dr. Nicholson:  [phone]  4.  Can follow up with Dr. Nicholson:  [address], 4th floor [phone], option 2.  An appointment has been made for Monday August 1st at 3 p        SECONDARY DISCHARGE DIAGNOSES  Diagnosis: Acute back pain  Assessment and Plan of Treatment: Acute back pain likely due to osteomyelitis, treated as above. Pain management was consulted and made the following recommendations.   Recommended Treatment PLAN:  1. Consider continuing Oxycodone 7.5 mg PO q4h PRN severe pain. If fails and patient able to tolerate well, consider escalating to Oxycodone 10 mg PO q4h PRN severe pain  2. Consider continuing Dilaudid 0.25 mg IVP q6h PRN breakthrough pain  3. Consider continuing Tylenol 650 mg PO every 6 hours  4. Consider continuing Methocarbamol 500 mg PO TID  5. Consider continuing Lyrica 50 mg PO TID  6. Consider continuing daily lidocaine patch to low back  HOLD ALL OPIOIDS FOR SEDATION, RR<10, O2SAT <93%, SBP <96    Diagnosis: Bacteremia  Assessment and Plan of Treatment: Bcx positive for Staph lungdunensis (7/14). Started on IV vanc and then transitioned to IV cefazolin. Surveillance Bcx from 7/16 and 7/17 showed no growth. Gallium scan/MRI indicated T12, L3 osteomyelitis as a probable source. Treat osteomyelitis as above.        Diagnosis: Acute respiratory failure with hypoxia  Assessment and Plan of Treatment: SpO2 dropped to 85% on RA, improved to>92% on 2L NC O2.  CXR neg for pulmonary edema or any other acute opacity.  Etiology may be in setting of pain.  Continue supplemental oxygen as needed, wean as tolerated    Diagnosis: Pancreatic mass  Assessment and Plan of Treatment: Pancreatic cysts are diagnosed with increasing frequency because of the widespread use of cross-sectional imaging. Pancreatic cysts may be detected in 40-50% of patients who undergo abdominal imaging for unrelated reasons. The frequency increases with age. There are different types of pancreatic cysts that include inflammatory collections of fluid, non-neoplastic, and neoplastic cysts. You can follow up with an MRI of your abdomen to help determine what type of cyst this is.    Diagnosis: Lung nodule  Assessment and Plan of Treatment: A pulmonary nodule is defined on imaging as a small (=30 mm), well-defined lesion surrounded by lung tissue. Pulmonary nodules may be detected on cross-sectional imaging studies performed for an unrelated reason (ie, incidental pulmonary nodule). You had a ~6 mm solid L lower lobe nodule found on imaging (CT). You can follow up in 6-12 months with a CT scan of your chest.     PRINCIPAL DISCHARGE DIAGNOSIS  Diagnosis: Acute osteomyelitis  Assessment and Plan of Treatment: MRI of Lumbar and Thoracic spine with no contrast: T9/10 with extruded disc compressing spinal cord. Gallium scan showed signs of osteomyelitis at T12, L3 and the right proximal psoas.   1.  Continue Cefazolin 2 grams IV q8hrs x 6 weeks total (7/16/22-8/27/22)  3.  Needs weekly CBC, CMP, ESR, CRP.  Fax to Dr. Nicholson:  [phone]  4.  Can follow up with Dr. Nicholson:  [address], 4th floor [phone], option 2.  An appointment has been made for Monday August 1st at 3 pm      SECONDARY DISCHARGE DIAGNOSES  Diagnosis: Acute back pain  Assessment and Plan of Treatment: Pain management was consulted and made the following recommendations.   Recommended Treatment PLAN:  1. Consider continuing Oxycodone 7.5 mg PO q4h PRN severe pain. If fails and patient able to tolerate well, consider escalating to Oxycodone 10 mg PO q4h PRN severe pain  2. Consider continuing Dilaudid 0.25 mg IVP q6h PRN breakthrough pain  3. Consider continuing Tylenol 650 mg PO every 6 hours  4. Consider continuing Methocarbamol 500 mg PO TID  5. Consider continuing Lyrica 50 mg PO TID  6. Consider continuing daily lidocaine patch to low back  HOLD ALL OPIOIDS FOR SEDATION, RR<10, O2SAT <93%, SBP <96    Diagnosis: Pressure ulcer  Assessment and Plan of Treatment: Patient with MASD to intergluteal cleft with blanchable erythema. Skin is eroded at base of cleft. Patient also noted to have ITD, which is being managed with nystatin powder with success; no erythema noted.   Intergluteal cleft MASD: Cleanse with bath wipes. Pat dry. Apply Triad cream and leave BUDDY. Apply twice daily and PRN for soiling. Do NOT try to remove all cream at each cleansing; remove only what is visibly soiled and reapply.  Continue to nystatin powder to manage ITD, or you may use InterDry without cream, lotions, and powders to all folds for up to 3 days. Change when sheet is wet or soiled.    Diagnosis: Bacteremia  Assessment and Plan of Treatment: Bcx positive for Staph lungdunensis (7/14). Started on IV vanc and then transitioned to IV cefazolin. Surveillance Bcx from 7/16 and 7/17 showed no growth. Gallium scan/MRI indicated T12, L3 osteomyelitis as a probable source.  See plan for osteomyelitis above.    Diagnosis: Acute respiratory failure with hypoxia  Assessment and Plan of Treatment: SpO2 dropped to 85% on RA, improved to>92% on 2L NC O2.  CXR neg for pulmonary edema or any other acute opacity.  Etiology may be in setting of pain.  Continue supplemental oxygen as needed, wean as tolerated    Diagnosis: Pancreatic mass  Assessment and Plan of Treatment: Pancreatic cysts are diagnosed with increasing frequency because of the widespread use of cross-sectional imaging. Pancreatic cysts may be detected in 40-50% of patients who undergo abdominal imaging for unrelated reasons. The frequency increases with age. There are different types of pancreatic cysts that include inflammatory collections of fluid, non-neoplastic, and neoplastic cysts. You can follow up with an MRI of your abdomen to help determine what type of cyst this is.    Diagnosis: Lung nodule  Assessment and Plan of Treatment: A pulmonary nodule is defined on imaging as a small (=30 mm), well-defined lesion surrounded by lung tissue. Pulmonary nodules may be detected on cross-sectional imaging studies performed for an unrelated reason (ie, incidental pulmonary nodule). You had a ~6 mm solid L lower lobe nodule found on imaging (CT). You can follow up in 6-12 months with a CT scan of your chest.

## 2022-07-19 NOTE — DISCHARGE NOTE PROVIDER - NSDCMRMEDTOKEN_GEN_ALL_CORE_FT
esomeprazole 40 mg oral delayed release capsule: 1 cap(s) orally once a day  Lasix 20 mg oral tablet: 1 tab(s) orally Monday, Wednesday, and Friday.  First dose on July 15th.  losartan 25 mg oral tablet: 1 tab(s) orally once a day.  First dose on July 13th.  multivitamin: each orally once a day  Synthroid 137 mcg (0.137 mg) oral tablet: 1 tab(s) orally once a day  Toprol-XL 25 mg oral tablet, extended release: Half tab(s) orally once a day.  First dose on July 13th  Vitamin B6: each orally once a day  Vitamin D3: each orally once a day   ceFAZolin: 2 gram(s) intravenously every 8 hours  esomeprazole 40 mg oral delayed release capsule: 1 cap(s) orally once a day  Lasix 20 mg oral tablet: 1 tab(s) orally Monday, Wednesday, and Friday.  First dose on July 15th.  losartan 25 mg oral tablet: 1 tab(s) orally once a day.  First dose on July 13th.  methocarbamol 500 mg oral tablet: 1 tab(s) orally every 8 hours  multivitamin: each orally once a day  nystatin 100,000 units/g topical powder: 1 application topically 2 times a day  polyethylene glycol 3350 oral powder for reconstitution: 17 gram(s) orally 2 times a day  pregabalin 50 mg oral capsule: 1 cap(s) orally 3 times a day  senna leaf extract oral tablet: 1 tab(s) orally every 24 hours  Synthroid 137 mcg (0.137 mg) oral tablet: 1 tab(s) orally once a day  Toprol-XL 25 mg oral tablet, extended release: Half tab(s) orally once a day.  First dose on July 13th  Vitamin B6: each orally once a day  Vitamin C 500 mg oral tablet: 1 tab(s) orally once a day  Vitamin D3: each orally once a day  zinc sulfate 220 mg oral capsule: 1 cap(s) orally once a day   ceFAZolin: 2 gram(s) intravenously every 8 hours  esomeprazole 40 mg oral delayed release capsule: 1 cap(s) orally once a day  HYDROmorphone 0.5 mg/0.5 mL injectable solution: 0.5 milligram(s) intravenously every 6 hours MDD:2mg  Lasix 20 mg oral tablet: 1 tab(s) orally Monday, Wednesday, and Friday.  First dose on July 15th.  losartan 25 mg oral tablet: 1 tab(s) orally once a day.  First dose on July 13th.  methocarbamol 500 mg oral tablet: 1 tab(s) orally every 8 hours  multivitamin: each orally once a day  nystatin 100,000 units/g topical powder: 1 application topically 2 times a day  oxyCODONE 7.5 mg oral tablet: 1 tab(s) orally every 4 hours, As needed, Severe Pain (7 - 10) MDD:45mg  polyethylene glycol 3350 oral powder for reconstitution: 17 gram(s) orally 2 times a day  pregabalin 50 mg oral capsule: 1 cap(s) orally 3 times a day  senna leaf extract oral tablet: 1 tab(s) orally every 24 hours  Synthroid 137 mcg (0.137 mg) oral tablet: 1 tab(s) orally once a day  Toprol-XL 25 mg oral tablet, extended release: Half tab(s) orally once a day.  First dose on July 13th  Vitamin B6: each orally once a day  Vitamin C 500 mg oral tablet: 1 tab(s) orally once a day  Vitamin D3: each orally once a day  zinc sulfate 220 mg oral capsule: 1 cap(s) orally once a day   acetaminophen 325 mg oral tablet: 2 tab(s) orally every 6 hours  ceFAZolin: 2 gram(s) intravenously every 8 hours  esomeprazole 40 mg oral delayed release capsule: 1 cap(s) orally once a day  HYDROmorphone 0.5 mg/0.5 mL injectable solution: 0.5 milligram(s) intravenously every 6 hours MDD:2mg  Lasix 20 mg oral tablet: 1 tab(s) orally Monday, Wednesday, and Friday.  First dose on July 15th.  losartan 25 mg oral tablet: 1 tab(s) orally once a day.  First dose on July 13th.  methocarbamol 500 mg oral tablet: 1 tab(s) orally every 8 hours  multivitamin: each orally once a day  nystatin 100,000 units/g topical powder: 1 application topically 2 times a day  oxyCODONE 7.5 mg oral tablet: 1 tab(s) orally every 4 hours, As needed, Severe Pain (7 - 10) MDD:45mg  polyethylene glycol 3350 oral powder for reconstitution: 17 gram(s) orally 2 times a day  pregabalin 50 mg oral capsule: 1 cap(s) orally 3 times a day  senna leaf extract oral tablet: 1 tab(s) orally every 24 hours  Synthroid 137 mcg (0.137 mg) oral tablet: 1 tab(s) orally once a day  Toprol-XL 25 mg oral tablet, extended release: Half tab(s) orally once a day.  First dose on July 13th  Vitamin B6: each orally once a day  Vitamin C 500 mg oral tablet: 1 tab(s) orally once a day  Vitamin D3: each orally once a day  zinc sulfate 220 mg oral capsule: 1 cap(s) orally once a day

## 2022-07-19 NOTE — DISCHARGE NOTE PROVIDER - CARE PROVIDERS DIRECT ADDRESSES
[email or web address]

## 2022-07-19 NOTE — DISCHARGE NOTE PROVIDER - CARE PROVIDER_API CALL
Khalif Miller  INTERNAL MEDICINE  [address] [place] [id number]  Phone: [phone]  Fax: [phone]  Established Patient  Scheduled Appointment: 09/19/2022 10:40 AM   Khalif Miller  INTERNAL MEDICINE  [address] [place] [address]  Phone: [phone]  Fax: [phone]  Established Patient  Scheduled Appointment: 09/19/2022 10:40 AM    Juan Carlos Pierre)  Neurosurgery  [address] [place] [address]  Phone: [phone]  Fax: [phone]  Established Patient  Scheduled Appointment: 08/10/2022 09:30 AM

## 2022-07-19 NOTE — PROGRESS NOTE ADULT - PROBLEM SELECTOR PLAN 1
Presented with Acute back pain after quick movement   - CT followed by MRI of Lumbar and Thoracic spine with no contrast: showed Multilevel degenerative disc disease. Probable large central disc herniation T9/10 with partially calcified extruded disc material extending superiorly behind the T9 vertebral body and compressing the spinal cord. Alternative considerations would include a meningioma. MRI with contrast study suggested.   - Neurosurgery recs (consulted for severe stenosis and possible meningioma): didn't suggest surgery at this time, will notify neurosurg of any change in neuro exam  - Pain control: lidocaine patch daily, robaxin 500 mg q8h, Tylenol 650 mg q6h, oxycodone 5 mg q6h, pregabalin 50 mg 3x daily  - Consult palliative for further pain control Presented with Acute back pain after quick movement   - CT followed by MRI of Lumbar and Thoracic spine with no contrast: showed Multilevel degenerative disc disease. Probable large central disc herniation T9/10 with partially calcified extruded disc material extending superiorly behind the T9 vertebral body and compressing the spinal cord. Alternative considerations would include a meningioma. MRI with contrast study suggested.   - Neurosurgery recs (consulted for severe stenosis and possible meningioma): didn't suggest surgery at this time, will notify neurosurg of any change in neuro exam  - Pain control: lidocaine patch daily, robaxin 500 mg q8h, Tylenol 650 mg q6h, oxycodone 5 mg q6h, pregabalin 50 mg 3x daily  - Palliative consulted for further pain control- suggested contacting Chronic pain for recs  - Starting 3-5 days Dexamethasone 4 mg PO qAM per Palliative ???? Presented with Acute back pain after quick movement   - CT followed by MRI of Lumbar and Thoracic spine with no contrast: showed Multilevel degenerative disc disease. Probable large central disc herniation T9/10 with partially calcified extruded disc material extending superiorly behind the T9 vertebral body and compressing the spinal cord.  - Neurosurgery recs (consulted for severe stenosis and possible meningioma): didn't suggest surgery at this time, will notify neurosurg of any change in neuro exam  - Pain control: lidocaine patch daily, robaxin 500 mg q8h, Tylenol 650 mg q6h, oxycodone 5 mg q6h, pregabalin 50 mg 3x daily  - Palliative consulted for further pain control- suggested contacting Chronic pain for recs

## 2022-07-19 NOTE — PROGRESS NOTE ADULT - PROBLEM SELECTOR PLAN 7
Patient on Lasix 20 mg PO M/W/F and losartan 25 mg qd; Toprol 12.5 qd at home  - recent admission to [facility] due to HF exacerbation   - c/w home meds toprol  - Restarted lasix and continued to hold losartan in the setting of resolved HERNESTO and bacteremia  - DASH/TLC diet  - f/u outpatient HF team Patient on Lasix 20 mg PO M/W/F and losartan 25 mg qd; Toprol 12.5 qd at home  - recent admission to [facility] due to HF exacerbation   - c/w home meds toprol  - Restarted lasix and continued to hold losartan in the setting of resolved HERNESTO and bacteremia  - DASH/TLC diet  - f/u outpatient HF team  - Echo showed evidence of ???? Patient on Lasix 20 mg PO M/W/F and losartan 25 mg qd; Toprol 12.5 qd at home  - recent admission to [facility] due to HF exacerbation   - c/w home meds toprol  - Restarted lasix and continued to hold losartan in the setting of resolved HERNESTO and bacteremia  - DASH/TLC diet  - f/u outpatient HF team  - Echo showed evidence of mild LVH w/ EF of 50%, Grade I LV diastolic dysfunction and mildly dilated LA

## 2022-07-19 NOTE — DISCHARGE NOTE PROVIDER - NSDCFUSCHEDAPPT_GEN_ALL_CORE_FT
Aundrea Izquierdo  [facility] Physician [facility]  [address]ARTVASC [address]  Scheduled Appointment: 08/02/2022     Aundrea Izquierdo  [facility]  HEARTVASC [address]  Scheduled Appointment: 08/02/2022    Khalif Miller  [facility]  INTMED [address]  Scheduled Appointment: 09/19/2022     Aundrea Izquierdo  [facility] Physician [facility]  HEARTVASC [address]  Scheduled Appointment: 08/02/2022    Juan Carlos Pierre  [facility] Physician [facility]  SPINECTR [address]  Scheduled Appointment: 08/10/2022    Khalif Miller  [facility] Physician [facility]  INTMED [address]  Scheduled Appointment: 09/19/2022     Isaac Nicholson  [facility] Physician [facility]  INFDISEASE [address]  Scheduled Appointment: 08/01/2022    Aundrea Izquierdo  [facility] Physician [facility]  HEARTVASC [address]  Scheduled Appointment: 08/02/2022    Juan Carlos Pierre  [facility]  SPINECTR [address]  Scheduled Appointment: 08/10/2022    Khalif Miller  [facility] Physician [facility]  INTMED [address]  Scheduled Appointment: 09/19/2022     Khalif Miller  [facility]  INTMED [address]  Scheduled Appointment: 08/01/2022    Isaac Nicholson  [facility]  INFDISEASE [address]  Scheduled Appointment: 08/01/2022    Aundrea Izquiredo  [facility]  HEARTVASC [address]  Scheduled Appointment: 08/02/2022    Juan Carlos Pierre  [facility]  SPINECTR [address]  Scheduled Appointment: 08/10/2022

## 2022-07-19 NOTE — PROGRESS NOTE ADULT - PROBLEM SELECTOR PLAN 8
Hgb: 10.0 with RDW 20.5, ferritin 793  Consistent with anemia of chronic disease with unknown source (possibly HF, obesity)  - monitor CBC daily

## 2022-07-19 NOTE — PROGRESS NOTE ADULT - SUBJECTIVE AND OBJECTIVE BOX
INTERVAL HPI/OVERNIGHT EVENTS: ZEN.    CONSTITUTIONAL:  Negative fever or chills, feels well, good appetite  EYES:  Negative  blurry vision or double vision  CARDIOVASCULAR:  Negative for chest pain or palpitations  RESPIRATORY:  Negative for cough, wheezing, or SOB   GASTROINTESTINAL:  Negative for nausea, vomiting, diarrhea, constipation, or abdominal pain  GENITOURINARY:  Negative frequency, urgency or dysuria  NEUROLOGIC:  No headache, confusion, dizziness, lightheadedness      ANTIBIOTICS/RELEVANT:    MEDICATIONS  (STANDING):  acetaminophen     Tablet .. 650 milliGRAM(s) Oral every 6 hours  ascorbic acid 500 milliGRAM(s) Oral daily  ceFAZolin   IVPB      ceFAZolin   IVPB 2000 milliGRAM(s) IV Intermittent every 8 hours  enoxaparin Injectable 40 milliGRAM(s) SubCutaneous every 12 hours  furosemide    Tablet 20 milliGRAM(s) Oral <User Schedule>  levothyroxine 137 MICROGram(s) Oral every 24 hours  lidocaine   4% Patch 1 Patch Transdermal daily  methocarbamol 500 milliGRAM(s) Oral every 8 hours  metoprolol succinate ER 12.5 milliGRAM(s) Oral every 24 hours  multivitamin 1 Tablet(s) Oral daily  nystatin Powder 1 Application(s) Topical two times a day  oxyCODONE    IR 5 milliGRAM(s) Oral every 4 hours  polyethylene glycol 3350 17 Gram(s) Oral every 24 hours  pregabalin 50 milliGRAM(s) Oral three times a day  senna 1 Tablet(s) Oral every 24 hours  zinc sulfate 220 milliGRAM(s) Oral daily    MEDICATIONS  (PRN):  HYDROmorphone  Injectable 1 milliGRAM(s) IV Push once PRN procedure  HYDROmorphone  Injectable 1 milliGRAM(s) IV Push once PRN procedure        Vital Signs Last 24 Hrs  T(C): 36.6 (19 Jul 2022 11:52), Max: 37.1 (18 Jul 2022 20:45)  T(F): 97.9 (19 Jul 2022 11:52), Max: 98.8 (18 Jul 2022 20:45)  HR: 68 (19 Jul 2022 11:52) (68 - 72)  BP: 104/66 (19 Jul 2022 11:52) (104/66 - 134/75)  BP(mean): --  RR: 18 (19 Jul 2022 11:52) (18 - 19)  SpO2: 98% (19 Jul 2022 11:52) (95% - 98%)    Parameters below as of 19 Jul 2022 11:52  Patient On (Oxygen Delivery Method): nasal cannula  O2 Flow (L/min): 2      PHYSICAL EXAM:  Constitutional: NAD  Eyes: MIKE, EOMI  Ear/Nose/Throat: no oral lesion, no sinus tenderness on percussion	  Neck: no JVD, no lymphadenopathy, supple  Respiratory: CTA fly  Cardiovascular: S1S2 RRR, no murmurs  Gastrointestinal:soft, (+) BS, no HSM  Extremities:no e/e/c  Vascular: DP Pulse:	right normal; left normal      LABS:                        10.5   6.23  )-----------( 142      ( 19 Jul 2022 08:51 )             32.8     07-19    136  |  95<L>  |  19  ----------------------------<  101<H>  3.6   |  31  |  0.70    Ca    8.6      19 Jul 2022 08:51  Phos  2.9     07-19  Mg     1.9     07-19    TPro  6.7  /  Alb  3.3  /  TBili  0.5  /  DBili  x   /  AST  51<H>  /  ALT  13  /  AlkPhos  58  07-18          MICROBIOLOGY: reviewed    RADIOLOGY & ADDITIONAL STUDIES: reviewed

## 2022-07-20 LAB
ANION GAP SERPL CALC-SCNC: 11 MMOL/L — SIGNIFICANT CHANGE UP (ref 5–17)
ANISOCYTOSIS BLD QL: SLIGHT — SIGNIFICANT CHANGE UP
BASOPHILS # BLD AUTO: 0.05 K/UL — SIGNIFICANT CHANGE UP (ref 0–0.2)
BASOPHILS NFR BLD AUTO: 0.9 % — SIGNIFICANT CHANGE UP (ref 0–2)
BUN SERPL-MCNC: 19 MG/DL — SIGNIFICANT CHANGE UP (ref 7–23)
CALCIUM SERPL-MCNC: 8.4 MG/DL — SIGNIFICANT CHANGE UP (ref 8.4–10.5)
CHLORIDE SERPL-SCNC: 95 MMOL/L — LOW (ref 96–108)
CO2 SERPL-SCNC: 30 MMOL/L — SIGNIFICANT CHANGE UP (ref 22–31)
CREAT SERPL-MCNC: 0.79 MG/DL — SIGNIFICANT CHANGE UP (ref 0.5–1.3)
EGFR: 74 ML/MIN/1.73M2 — SIGNIFICANT CHANGE UP
EOSINOPHIL # BLD AUTO: 0.1 K/UL — SIGNIFICANT CHANGE UP (ref 0–0.5)
EOSINOPHIL NFR BLD AUTO: 1.7 % — SIGNIFICANT CHANGE UP (ref 0–6)
GIANT PLATELETS BLD QL SMEAR: PRESENT — SIGNIFICANT CHANGE UP
GLUCOSE SERPL-MCNC: 101 MG/DL — HIGH (ref 70–99)
HCT VFR BLD CALC: 30.3 % — LOW (ref 34.5–45)
HGB BLD-MCNC: 9.8 G/DL — LOW (ref 11.5–15.5)
HYPOCHROMIA BLD QL: SLIGHT — SIGNIFICANT CHANGE UP
LYMPHOCYTES # BLD AUTO: 2.07 K/UL — SIGNIFICANT CHANGE UP (ref 1–3.3)
LYMPHOCYTES # BLD AUTO: 34.2 % — SIGNIFICANT CHANGE UP (ref 13–44)
MACROCYTES BLD QL: SLIGHT — SIGNIFICANT CHANGE UP
MAGNESIUM SERPL-MCNC: 1.9 MG/DL — SIGNIFICANT CHANGE UP (ref 1.6–2.6)
MANUAL SMEAR VERIFICATION: SIGNIFICANT CHANGE UP
MCHC RBC-ENTMCNC: 31.4 PG — SIGNIFICANT CHANGE UP (ref 27–34)
MCHC RBC-ENTMCNC: 32.3 GM/DL — SIGNIFICANT CHANGE UP (ref 32–36)
MCV RBC AUTO: 97.1 FL — SIGNIFICANT CHANGE UP (ref 80–100)
MICROCYTES BLD QL: SLIGHT — SIGNIFICANT CHANGE UP
MONOCYTES # BLD AUTO: 0.75 K/UL — SIGNIFICANT CHANGE UP (ref 0–0.9)
MONOCYTES NFR BLD AUTO: 12.3 % — SIGNIFICANT CHANGE UP (ref 2–14)
MYELOCYTES NFR BLD: 1.7 % — HIGH (ref 0–0)
NEUTROPHILS # BLD AUTO: 2.66 K/UL — SIGNIFICANT CHANGE UP (ref 1.8–7.4)
NEUTROPHILS NFR BLD AUTO: 43.9 % — SIGNIFICANT CHANGE UP (ref 43–77)
OVALOCYTES BLD QL SMEAR: SLIGHT — SIGNIFICANT CHANGE UP
PHOSPHATE SERPL-MCNC: 3 MG/DL — SIGNIFICANT CHANGE UP (ref 2.5–4.5)
PLAT MORPH BLD: ABNORMAL
PLATELET # BLD AUTO: 146 K/UL — LOW (ref 150–400)
POLYCHROMASIA BLD QL SMEAR: SLIGHT — SIGNIFICANT CHANGE UP
POTASSIUM SERPL-MCNC: 3.9 MMOL/L — SIGNIFICANT CHANGE UP (ref 3.5–5.3)
POTASSIUM SERPL-SCNC: 3.9 MMOL/L — SIGNIFICANT CHANGE UP (ref 3.5–5.3)
RBC # BLD: 3.12 M/UL — LOW (ref 3.8–5.2)
RBC # FLD: 19.2 % — HIGH (ref 10.3–14.5)
RBC BLD AUTO: ABNORMAL
SARS-COV-2 RNA SPEC QL NAA+PROBE: SIGNIFICANT CHANGE UP
SODIUM SERPL-SCNC: 136 MMOL/L — SIGNIFICANT CHANGE UP (ref 135–145)
SPHEROCYTES BLD QL SMEAR: SLIGHT — SIGNIFICANT CHANGE UP
VARIANT LYMPHS # BLD: 5.3 % — SIGNIFICANT CHANGE UP (ref 0–6)
WBC # BLD: 6.06 K/UL — SIGNIFICANT CHANGE UP (ref 3.8–10.5)
WBC # FLD AUTO: 6.06 K/UL — SIGNIFICANT CHANGE UP (ref 3.8–10.5)

## 2022-07-20 PROCEDURE — 71045 X-RAY EXAM CHEST 1 VIEW: CPT | Mod: 26

## 2022-07-20 PROCEDURE — 99233 SBSQ HOSP IP/OBS HIGH 50: CPT | Mod: GC

## 2022-07-20 PROCEDURE — 78802 RP LOCLZJ TUM WHBDY 1 D IMG: CPT | Mod: 26

## 2022-07-20 PROCEDURE — 99232 SBSQ HOSP IP/OBS MODERATE 35: CPT

## 2022-07-20 RX ORDER — OXYCODONE HYDROCHLORIDE 5 MG/1
7.5 TABLET ORAL EVERY 4 HOURS
Refills: 0 | Status: DISCONTINUED | OUTPATIENT
Start: 2022-07-20 | End: 2022-07-22

## 2022-07-20 RX ORDER — HYDROMORPHONE HYDROCHLORIDE 2 MG/ML
0.25 INJECTION INTRAMUSCULAR; INTRAVENOUS; SUBCUTANEOUS EVERY 6 HOURS
Refills: 0 | Status: DISCONTINUED | OUTPATIENT
Start: 2022-07-20 | End: 2022-07-22

## 2022-07-20 RX ORDER — POTASSIUM PHOSPHATE, MONOBASIC POTASSIUM PHOSPHATE, DIBASIC 236; 224 MG/ML; MG/ML
15 INJECTION, SOLUTION INTRAVENOUS ONCE
Refills: 0 | Status: COMPLETED | OUTPATIENT
Start: 2022-07-20 | End: 2022-07-20

## 2022-07-20 RX ORDER — FUROSEMIDE 40 MG
40 TABLET ORAL ONCE
Refills: 0 | Status: COMPLETED | OUTPATIENT
Start: 2022-07-20 | End: 2022-07-20

## 2022-07-20 RX ADMIN — Medication 650 MILLIGRAM(S): at 19:00

## 2022-07-20 RX ADMIN — METHOCARBAMOL 500 MILLIGRAM(S): 500 TABLET, FILM COATED ORAL at 02:32

## 2022-07-20 RX ADMIN — Medication 50 MILLIGRAM(S): at 21:52

## 2022-07-20 RX ADMIN — NYSTATIN CREAM 1 APPLICATION(S): 100000 CREAM TOPICAL at 17:54

## 2022-07-20 RX ADMIN — Medication 50 MILLIGRAM(S): at 13:56

## 2022-07-20 RX ADMIN — Medication 100 MILLIGRAM(S): at 18:21

## 2022-07-20 RX ADMIN — ZINC SULFATE TAB 220 MG (50 MG ZINC EQUIVALENT) 220 MILLIGRAM(S): 220 (50 ZN) TAB at 11:41

## 2022-07-20 RX ADMIN — NYSTATIN CREAM 1 APPLICATION(S): 100000 CREAM TOPICAL at 06:38

## 2022-07-20 RX ADMIN — Medication 137 MICROGRAM(S): at 06:37

## 2022-07-20 RX ADMIN — Medication 100 MILLIGRAM(S): at 02:32

## 2022-07-20 RX ADMIN — LIDOCAINE 1 PATCH: 4 CREAM TOPICAL at 18:01

## 2022-07-20 RX ADMIN — Medication 650 MILLIGRAM(S): at 13:56

## 2022-07-20 RX ADMIN — LIDOCAINE 1 PATCH: 4 CREAM TOPICAL at 11:39

## 2022-07-20 RX ADMIN — Medication 650 MILLIGRAM(S): at 01:05

## 2022-07-20 RX ADMIN — POLYETHYLENE GLYCOL 3350 17 GRAM(S): 17 POWDER, FOR SOLUTION ORAL at 07:29

## 2022-07-20 RX ADMIN — Medication 500 MILLIGRAM(S): at 11:40

## 2022-07-20 RX ADMIN — OXYCODONE HYDROCHLORIDE 5 MILLIGRAM(S): 5 TABLET ORAL at 06:36

## 2022-07-20 RX ADMIN — Medication 1 TABLET(S): at 11:41

## 2022-07-20 RX ADMIN — OXYCODONE HYDROCHLORIDE 5 MILLIGRAM(S): 5 TABLET ORAL at 10:05

## 2022-07-20 RX ADMIN — Medication 650 MILLIGRAM(S): at 06:37

## 2022-07-20 RX ADMIN — SENNA PLUS 1 TABLET(S): 8.6 TABLET ORAL at 21:52

## 2022-07-20 RX ADMIN — METHOCARBAMOL 500 MILLIGRAM(S): 500 TABLET, FILM COATED ORAL at 09:14

## 2022-07-20 RX ADMIN — OXYCODONE HYDROCHLORIDE 5 MILLIGRAM(S): 5 TABLET ORAL at 09:13

## 2022-07-20 RX ADMIN — Medication 40 MILLIGRAM(S): at 15:28

## 2022-07-20 RX ADMIN — Medication 20 MILLIGRAM(S): at 11:41

## 2022-07-20 RX ADMIN — Medication 50 MILLIGRAM(S): at 06:37

## 2022-07-20 RX ADMIN — Medication 650 MILLIGRAM(S): at 14:43

## 2022-07-20 RX ADMIN — Medication 650 MILLIGRAM(S): at 18:21

## 2022-07-20 RX ADMIN — Medication 100 MILLIGRAM(S): at 11:39

## 2022-07-20 RX ADMIN — ENOXAPARIN SODIUM 40 MILLIGRAM(S): 100 INJECTION SUBCUTANEOUS at 06:37

## 2022-07-20 RX ADMIN — METHOCARBAMOL 500 MILLIGRAM(S): 500 TABLET, FILM COATED ORAL at 17:44

## 2022-07-20 RX ADMIN — OXYCODONE HYDROCHLORIDE 5 MILLIGRAM(S): 5 TABLET ORAL at 02:05

## 2022-07-20 RX ADMIN — OXYCODONE HYDROCHLORIDE 7.5 MILLIGRAM(S): 5 TABLET ORAL at 21:52

## 2022-07-20 RX ADMIN — ENOXAPARIN SODIUM 40 MILLIGRAM(S): 100 INJECTION SUBCUTANEOUS at 17:44

## 2022-07-20 RX ADMIN — Medication 12.5 MILLIGRAM(S): at 15:28

## 2022-07-20 RX ADMIN — POTASSIUM PHOSPHATE, MONOBASIC POTASSIUM PHOSPHATE, DIBASIC 62.5 MILLIMOLE(S): 236; 224 INJECTION, SOLUTION INTRAVENOUS at 17:46

## 2022-07-20 RX ADMIN — OXYCODONE HYDROCHLORIDE 7.5 MILLIGRAM(S): 5 TABLET ORAL at 22:46

## 2022-07-20 NOTE — PROGRESS NOTE ADULT - PROBLEM SELECTOR PLAN 7
Patient on Lasix 20 mg PO M/W/F and losartan 25 mg qd; Toprol 12.5 qd at home  - recent admission to [facility] due to HF exacerbation   - c/w home meds toprol  - Restarted lasix and continued to hold losartan in the setting of resolved HERNESTO and bacteremia  - DASH/TLC diet  - f/u outpatient HF team  - Echo showed evidence of mild LVH w/ EF of 50%, Grade I LV diastolic dysfunction and mildly dilated LA Resolved. Patient recently admitted for CHF. Since discharge, significantly decreased fluid intake. In ED: BUN: 60; Cr: 2.03  - most likely prerenal 2/2 to reduced PO intake and in the setting of lasix use   - unremarkable UA   - encouraged increased PO fluid intake   - Restarted lasix but continuing to hold losartan

## 2022-07-20 NOTE — PROGRESS NOTE ADULT - PROBLEM SELECTOR PLAN 3
Resolved. Patient recently admitted for CHF. Since discharge, significantly decreased fluid intake. In ED: BUN: 60; Cr: 2.03  - most likely prerenal 2/2 to reduced PO intake and in the setting of lasix use   - unremarkable UA   - encouraged increased PO fluid intake   - Restarted lasix but continuing to hold losartan SpO2 drops to 85% on RA  - O2 raised from 2 to 3 L NC due to SpO2 drop overnight  - Do CXR to investigate HF investigation SpO2 drops to 85% on RA  - O2 raised from 2 to 3 L NC due to SpO2 drop overnight  - CXR - no acute focal opacity, no interval change from last admission  - Lasix 40mg IVP once today

## 2022-07-20 NOTE — PROGRESS NOTE ADULT - ASSESSMENT
85 yo female with morbid obesity, recent admission for HF, discharged two days ago, now presents with severe acute LBP, bandemia, found to have S. lugdunensis BSI in setting of MRI showing degenerative disc disease, spinal stenosis, and T9-10 herniation with cord compression; no overt findings of osteomyelitis or discitis on MRI.   - f/u surveillance bcx 7/16--ngtd  - f/u gallium scan  - continue cefazolin 2g IV q8h     Dr. Cruz assumes care Thursday    ID Team 2

## 2022-07-20 NOTE — PROGRESS NOTE ADULT - PROBLEM SELECTOR PLAN 9
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 Pt reports not having follow up in years.   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 Pt reports not having follow up in years.   - CEA and  not elevated  -f/up outpatient for abdominal MRI (pancreatic protocol, without and with contrast)

## 2022-07-20 NOTE — CONSULT NOTE ADULT - REASON FOR ADMISSION
HERNESTO and pain management

## 2022-07-20 NOTE — CONSULT NOTE ADULT - SUBJECTIVE AND OBJECTIVE BOX
Pain Management Consult [facility] [phone]    Chief Complaint: low back pain     HPI: Patient seen and examined today. This is an 87 y/o female PMH of HFpEF who presented with atraumatic back pain admitted for HERNESTO and severe back pain. Patient reports back pain began last 1 week ago, stating that she took a nap and woke up in tremendous pain. Patient reports pain is localized just to the low back and does not radiate down the lower extremities. Patient reports pain is ok at rest, but increases substantially with any movement. Patient reports endorsing some numbness and tingling to the bilateral feet, stating that she has neuropathy that is normally present at baseline. At home, patient reports taking Ibuprofen and advil for pain, patient is istop negative. Patient with MRI showing with reports of grade 1 retrolisthesis of L1 and L2 and anterolisthesis of L4-5, multilevel disc disease with spinal stenosis and neural foramen narrowing, seen by neurosurgery who reports no intervention at this time. Patient remains on Oxycodone 5 mg po every 4 hours, reports this helps but does not help if she cannot take it around the time of when she is moved around in bed. Patient denies any side effects from current pain regimen.    Pain is __X_ sharp ____dull ___burning ___achy ___ Intensity: ____ mild __X_mod __X_severe     Location ____surgical site ____cervical ___X__lumbar ____abd ____upper ext____lower ext    Worse with __X__activity _X___movement _____physical therapy___ Rest    Improved with __X__medication X____rest ____physical therapy    ROS: Const:  N___febrile   Eyes:___ENT:___CV: __N_chest pain  Resp: _N___sob  GI:_N__nausea N___vomiting __N_abd pain ___npo ___clears Y__full diet __bm  :___ Musk: _Y__pain ___spasm  Skin:___ Neuro:  __N_sedation___confusion___Y numbness ___weakness _Y__paresth  Psych:__anxiety  Endo:___ Heme:___Allergy:__LEVAQUIN______, __Y_all others reviewed and negative    PAST MEDICAL & SURGICAL HISTORY:  Fluid retention  Hypothyroidism  H/O CHF  S/P appendectomy  S/P cholecystectomy  S/P hysteretomy    SH: _N_Tobacco   _N__Alcohol                          FH:FAMILY HISTORY: NO PERTINENT MEDICAL HISTORY NOTED IN FIRST DEGREE RELATIVES    acetaminophen     Tablet .. 650 milliGRAM(s) Oral every 6 hours  ascorbic acid 500 milliGRAM(s) Oral daily  ceFAZolin   IVPB 2000 milliGRAM(s) IV Intermittent every 8 hours  ceFAZolin   IVPB      enoxaparin Injectable 40 milliGRAM(s) SubCutaneous every 12 hours  furosemide    Tablet 20 milliGRAM(s) Oral <User Schedule>  HYDROmorphone  Injectable 1 milliGRAM(s) IV Push once PRN  HYDROmorphone  Injectable 1 milliGRAM(s) IV Push once PRN  levothyroxine 137 MICROGram(s) Oral every 24 hours  lidocaine   4% Patch 1 Patch Transdermal daily  methocarbamol 500 milliGRAM(s) Oral every 8 hours  metoprolol succinate ER 12.5 milliGRAM(s) Oral every 24 hours  multivitamin 1 Tablet(s) Oral daily  nystatin Powder 1 Application(s) Topical two times a day  oxyCODONE    IR 5 milliGRAM(s) Oral every 4 hours  polyethylene glycol 3350 17 Gram(s) Oral every 24 hours  potassium phosphate IVPB 15 milliMole(s) IV Intermittent once  pregabalin 50 milliGRAM(s) Oral three times a day  senna 1 Tablet(s) Oral every 24 hours  zinc sulfate 220 milliGRAM(s) Oral daily      T(C): 36.6 (07-20-22 @ 06:28), Max: 36.8 (07-19-22 @ 21:02)  HR: 61 (07-20-22 @ 06:28) (61 - 78)  BP: 115/74 (07-20-22 @ 06:28) (104/68 - 115/74)  RR: 18 (07-20-22 @ 06:28) (17 - 18)  SpO2: 96% (07-20-22 @ 06:28) (93% - 96%)  Wt(kg): --    T(C): 36.6 (07-20-22 @ 06:28), Max: 36.8 (07-19-22 @ 21:02)  HR: 61 (07-20-22 @ 06:28) (61 - 78)  BP: 115/74 (07-20-22 @ 06:28) (104/68 - 115/74)  RR: 18 (07-20-22 @ 06:28) (17 - 18)  SpO2: 96% (07-20-22 @ 06:28) (93% - 96%)  Wt(kg): --    T(C): 36.6 (07-20-22 @ 06:28), Max: 36.8 (07-19-22 @ 21:02)  HR: 61 (07-20-22 @ 06:28) (61 - 78)  BP: 115/74 (07-20-22 @ 06:28) (104/68 - 115/74)  RR: 18 (07-20-22 @ 06:28) (17 - 18)  SpO2: 96% (07-20-22 @ 06:28) (93% - 96%)  Wt(kg): --    PHYSICAL EXAM:  Gen Appearance: __X_no acute distress _X__appropriate        Neuro: ___SILT feet____ EOM Intact Psych: AAOX_3_, _X__mood/affect appropriate        Eyes: _X__conjunctiva WNL  _X____ Pupils equal and round        ENT: __X_ears and nose atraumatic__X_ Hearing grossly intact        Neck: _X__trachea midline, no visible masses ___thyroid without palpable mass    Resp: _X__Nml WOB____No tactile fremitus ___clear to auscultation    Cardio: __X_extremities free from edema ____pedal pulses palpable    GI/Abdomen: ___soft _____ Nontender____X__Nondistended_____HSM    Lymphatic: ___no palpable nodes in neck  ___no palpable nodes calves and feet    Skin/Wound: ___Incision, ___Dressing c/d/i,   ____surrounding tissues soft,  ___drain/chest tube present____    Muscular: EHL ___/5  Gastrocnemius___/5    _X__absent clubbing/cyanosis      ASSESSMENT: This is a 86y old Female with a history of HFpEF, HTN, RA, hypothyroidism, morbid obesity who presented with atraumatic back pain admitted for HERNESTO and severe back pain, with MRI showing grade 1 retrolisthesis of L1 and L2 and anterolisthesis of L4-5, multilevel disc disease with spinal stenosis and neural foramen narrowing, with reports of back pain.    Recommended Treatment PLAN:                 [facility] [phone]    Chief Complaint: low back pain     HPI: Patient seen and examined today. This is an 87 y/o female PMH of HFpEF who presented with atraumatic back pain admitted for HERNESTO and severe back pain. Patient reports back pain began last 1 week ago, stating that she took a nap and woke up in tremendous pain. Patient reports pain is localized just to the low back and does not radiate down the lower extremities. Patient reports pain is ok at rest, but increases substantially with any movement. Patient reports endorsing some numbness and tingling to the bilateral feet, stating that she has neuropathy that is normally present at baseline. At home, patient reports taking Ibuprofen and advil for pain, patient is istop negative. Patient with MRI showing with reports of grade 1 retrolisthesis of L1 and L2 and anterolisthesis of L4-5, multilevel disc disease with spinal stenosis and neural foramen narrowing, seen by neurosurgery who reports no intervention at this time. Patient remains on Oxycodone 5 mg po every 4 hours, reports this helps but does not help if she cannot take it around the time of when she is moved around in bed. Patient denies any side effects from current pain regimen.    Pain is __X_ sharp ____dull ___burning ___achy ___ Intensity: ____ mild __X_mod __X_severe     Location ____surgical site ____cervical ___X__lumbar ____abd ____upper ext____lower ext    Worse with __X__activity _X___movement _____physical therapy___ Rest    Improved with __X__medication X____rest ____physical therapy    ROS: Const:  N___febrile   Eyes:___ENT:___CV: __N_chest pain  Resp: _N___sob  GI:_N__nausea N___vomiting __N_abd pain ___npo ___clears Y__full diet __bm  :___ Musk: _Y__pain ___spasm  Skin:___ Neuro:  __N_sedation___confusion___Y numbness ___weakness _Y__paresth  Psych:__anxiety  Endo:___ Heme:___Allergy:__LEVAQUIN______, __Y_all others reviewed and negative    PAST MEDICAL & SURGICAL HISTORY:  Fluid retention  Hypothyroidism  H/O CHF  S/P appendectomy  S/P cholecystectomy  S/P hysteretomy    SH: _N_Tobacco   _N__Alcohol                          FH:FAMILY HISTORY: NO PERTINENT MEDICAL HISTORY NOTED IN FIRST DEGREE RELATIVES    acetaminophen     Tablet .. 650 milliGRAM(s) Oral every 6 hours  ascorbic acid 500 milliGRAM(s) Oral daily  ceFAZolin   IVPB 2000 milliGRAM(s) IV Intermittent every 8 hours  ceFAZolin   IVPB      enoxaparin Injectable 40 milliGRAM(s) SubCutaneous every 12 hours  furosemide    Tablet 20 milliGRAM(s) Oral <User Schedule>  HYDROmorphone  Injectable 1 milliGRAM(s) IV Push once PRN  HYDROmorphone  Injectable 1 milliGRAM(s) IV Push once PRN  levothyroxine 137 MICROGram(s) Oral every 24 hours  lidocaine   4% Patch 1 Patch Transdermal daily  methocarbamol 500 milliGRAM(s) Oral every 8 hours  metoprolol succinate ER 12.5 milliGRAM(s) Oral every 24 hours  multivitamin 1 Tablet(s) Oral daily  nystatin Powder 1 Application(s) Topical two times a day  oxyCODONE    IR 5 milliGRAM(s) Oral every 4 hours  polyethylene glycol 3350 17 Gram(s) Oral every 24 hours  potassium phosphate IVPB 15 milliMole(s) IV Intermittent once  pregabalin 50 milliGRAM(s) Oral three times a day  senna 1 Tablet(s) Oral every 24 hours  zinc sulfate 220 milliGRAM(s) Oral daily      T(C): 36.6 (07-20-22 @ 06:28), Max: 36.8 (07-19-22 @ 21:02)  HR: 61 (07-20-22 @ 06:28) (61 - 78)  BP: 115/74 (07-20-22 @ 06:28) (104/68 - 115/74)  RR: 18 (07-20-22 @ 06:28) (17 - 18)  SpO2: 96% (07-20-22 @ 06:28) (93% - 96%)  Wt(kg): --    T(C): 36.6 (07-20-22 @ 06:28), Max: 36.8 (07-19-22 @ 21:02)  HR: 61 (07-20-22 @ 06:28) (61 - 78)  BP: 115/74 (07-20-22 @ 06:28) (104/68 - 115/74)  RR: 18 (07-20-22 @ 06:28) (17 - 18)  SpO2: 96% (07-20-22 @ 06:28) (93% - 96%)  Wt(kg): --    T(C): 36.6 (07-20-22 @ 06:28), Max: 36.8 (07-19-22 @ 21:02)  HR: 61 (07-20-22 @ 06:28) (61 - 78)  BP: 115/74 (07-20-22 @ 06:28) (104/68 - 115/74)  RR: 18 (07-20-22 @ 06:28) (17 - 18)  SpO2: 96% (07-20-22 @ 06:28) (93% - 96%)  Wt(kg): --    PHYSICAL EXAM:  Gen Appearance: __X_no acute distress _X__appropriate        Neuro: ___SILT feet____ EOM Intact Psych: AAOX_3_, _X__mood/affect appropriate        Eyes: _X__conjunctiva WNL  _X____ Pupils equal and round        ENT: __X_ears and nose atraumatic__X_ Hearing grossly intact        Neck: _X__trachea midline, no visible masses ___thyroid without palpable mass    Resp: _X__Nml WOB____No tactile fremitus ___clear to auscultation    Cardio: __X_extremities free from edema ____pedal pulses palpable    GI/Abdomen: ___soft _____ Nontender____X__Nondistended_____HSM    Lymphatic: ___no palpable nodes in neck  ___no palpable nodes calves and feet    Skin/Wound: ___Incision, ___Dressing c/d/i,   ____surrounding tissues soft,  ___drain/chest tube present____    Muscular: EHL ___/5  Gastrocnemius___/5    _X__absent clubbing/cyanosis      ASSESSMENT: This is a 86y old Female with a history of HFpEF, HTN, RA, hypothyroidism, morbid obesity who presented with atraumatic back pain admitted for HERNESTO and severe back pain, with MRI showing grade 1 retrolisthesis of L1 and L2 and anterolisthesis of L4-5, multilevel disc disease with spinal stenosis and neural foramen narrowing, with reports of back pain.    Recommended Treatment PLAN:  1. Consider discontinuing Oxycodone 5 mg PO every 4 hours  2. Consider starting Oxycodone 7.5 mg PO q4h PRN severe pain. If fails and patient able to tolerate well, consider escalating to Oxycodone 10 mg PO q4h PRN severe pain  3. Consider starting Dilaudid 0.25 mg IVP q6h PRN breakthrough pain  4. Consider continuing Tylenol 650 mg PO every 6 hours  5. Consider continuing Methocarbamol 500 mg PO TID  6. Consider continuing Lyrica 50 mg PO TID  7. Consider continuing daily lidocaine patch to low back  HOLD ALL OPIOIDS FOR SEDATION, RR<10, O2SAT <93%, SBP <96  Plan discussed with Dr. Houston

## 2022-07-20 NOTE — PROGRESS NOTE ADULT - PROBLEM SELECTOR PLAN 5
SpO2 drops to 85% on RA  - SpO2 at 97% on 2L NC Patient on Lasix 20 mg PO M/W/F and losartan 25 mg qd; Toprol 12.5 qd at home  - recent admission to [facility] due to HF exacerbation   - c/w home meds toprol  - Restarted lasix and continued to hold losartan in the setting of resolved HERNESTO and bacteremia  - Chest xray to check for HF exacerbation  - Checking daily weights  - DASH/TLC diet  - f/u outpatient HF team  - Echo showed evidence of mild LVH w/ EF of 50%, Grade I LV diastolic dysfunction and mildly dilated LA Patient on Lasix 20 mg PO M/W/F and losartan 25 mg qd; Toprol 12.5 qd at home  - recent admission to [facility] due to HF exacerbation   - c/w home meds toprol  - Restarted lasix and continued to hold losartan in the setting of resolved HERNESTO and bacteremia  - CXR - no acute focal opacity, no interval change from last admission  - Checking daily weights  - DASH/TLC diet  - f/u outpatient HF team  - Echo showed evidence of mild LVH w/ EF of 50%, Grade I LV diastolic dysfunction and mildly dilated LA

## 2022-07-20 NOTE — CONSULT NOTE ADULT - CONSULT REASON
low back pain
lumbar spinal stenosis and possible T9/T10 disc calcification vs meningioma
bacteremia
pain mgmt
Rehab evaluation

## 2022-07-20 NOTE — PROGRESS NOTE ADULT - PROBLEM SELECTOR PLAN 2
admission bloodwork showed no leukocytosis (WBC: 6.43) with bandemia 11%. Patient was afebrile, stable vitals with no sx of active infection.   - Bcx positive for Staph lungdunensis (7/14)  - Bcx from 7/16 and 7/17 showed no growth   - ID recs: Gallium scan ?source  - Continuing IV cefazolin (D4) as per ID recs (transitioned from vanc)  - UA negative for UTI, RVP negative; no focal consolidations on CXR (recent admission)  - TTE showed no evidence of endocarditis admission bloodwork showed no leukocytosis (WBC: 6.43) with bandemia 11%. Patient was afebrile, stable vitals with no sx of active infection.   - Bcx positive for Staph lungdunensis (7/14)  - Bcx from 7/16 and 7/17 showed no growth   - ID recs: Gallium scan to find source - Gallium scan started. f/u with results   - Continuing IV cefazolin (D5) as per ID recs (transitioned from vanc)  - UA negative for UTI, RVP negative; no focal consolidations on CXR (recent admission); will repeat CXR  - TTE showed no evidence of endocarditis admission bloodwork showed no leukocytosis (WBC: 6.43) with bandemia 11%. Patient was afebrile, stable vitals with no sx of active infection.   - Bcx positive for Staph lungdunensis (7/14)  - Bcx from 7/16 and 7/17 showed no growth   - ID recs: Gallium scan to find source - Gallium scan started. f/u with results   - Continuing IV cefazolin (D5) as per ID recs (transitioned from vanc)  - UA negative for UTI, RVP negative; no focal consolidations on CXR  - TTE showed no evidence of endocarditis

## 2022-07-20 NOTE — PROGRESS NOTE ADULT - PROBLEM SELECTOR PLAN 1
Presented with Acute back pain after quick movement   - CT followed by MRI of Lumbar and Thoracic spine with no contrast: showed Multilevel degenerative disc disease. Probable large central disc herniation T9/10 with partially calcified extruded disc material extending superiorly behind the T9 vertebral body and compressing the spinal cord.  - Neurosurgery recs (consulted for severe stenosis and possible meningioma): didn't suggest surgery at this time, will notify neurosurg of any change in neuro exam  - Pain control: lidocaine patch daily, robaxin 500 mg q8h, Tylenol 650 mg q6h, oxycodone 5 mg q6h, pregabalin 50 mg 3x daily  - Palliative consulted for further pain control- suggested contacting Chronic pain for recs Presented with Acute back pain after quick movement   - CT followed by MRI of Lumbar and Thoracic spine with no contrast: showed Multilevel degenerative disc disease. Probable large central disc herniation T9/10 with partially calcified extruded disc material extending superiorly behind the T9 vertebral body and compressing the spinal cord.  - Neurosurgery recs (consulted for severe stenosis and possible meningioma): didn't suggest surgery at this time, will notify neurosurg of any change in neuro exam  - Pain control as recommended by pain mgmt team: lidocaine patch daily, robaxin 500 mg q8h, Tylenol 650 mg q6h, oxycodone 7.5 mg q6h PRN for severe pain, dilaudid 0.25mg q4 PRN for breakthrough pain  pregabalin 50 mg 3x daily

## 2022-07-20 NOTE — PROGRESS NOTE ADULT - PROBLEM SELECTOR PLAN 8
Hgb: 10.0 with RDW 20.5, ferritin 793  Consistent with anemia of chronic disease with unknown source (possibly HF, obesity)  - monitor CBC daily Erythematous scaly patches present diffusely in skin folds  - Treated w/ nystatin powder

## 2022-07-20 NOTE — PROGRESS NOTE ADULT - PROBLEM SELECTOR PLAN 10
platelets: 140 at admission and consistency low  most likely reactive   - continue to trend with CBC platelets: 140 at admission and consistently low  most likely reactive   - continue to trend with CBC

## 2022-07-20 NOTE — PROGRESS NOTE ADULT - SUBJECTIVE AND OBJECTIVE BOX
***INCOMPLETE***    INTERVAL HPI/OVERNIGHT EVENTS:  Patient was seen and examined at bedside. As per patient, continues to have severe sharp stabbing lower back pain with movement (severity 10/10). Same as yesterday. Patient denies: urinary or bowel incontinence, loss of sensation, paresthesias, headaches, nausea, vomiting, night sweats, chills, CP, SOB. She's having fluids but has mouth dryness. She's had no BM since admission.     Vitals:    T(F): 97.9 (20 Jul 2022 06:28), Max: 98.2 (19 Jul 2022 21:02)  HR: 61 (20 Jul 2022 06:28)  BP: 115/74 (20 Jul 2022 06:28)  RR: 18 (20 Jul 2022 06:28)  SpO2: 96% (20 Jul 2022 06:28) (93% - 98%)  temp max in last 48H T(F): , Max: 98.8 (07-18-22 @ 20:45)    PHYSICAL EXAM:    Constitutional: Patient is awake and alert; Morbidly obese; Appears to be resting comfortable when not moving.  HEENT: EOMI, CN II-XII intact, sclera non-icteric  Respiratory: CTA b/l, no wheezing, no rhonchi, no rales  Cardiovascular: normal S1S2; +3/6 systolic murmur loudest at the right upper sternal border that radiates to the carotids  Gastrointestinal: soft, NTND, no masses palpable; hyperactive bowel sounds; erythematous scaly patches along abdominal folds concerning for intertrigo covered in nystatin powder; bruising noted bilaterally in lower abdominal quadrants  Extremities: Cool, well perfused; radial and posterior tibial pulses present bilaterally; bandage covering lesion on L hip; intertrigo in popliteal fossa bilaterally; LE range of motion limited by pain b/l; no dermatomal sensation deficits noted in UEs or LEs; 2+ UE reflexes, Lower extremity reflexes hard to ascertain due to patient position; negative Babinksi sign; slightly decreased light touch sensation in toe phalanges concerning for peripheral neuropathy   Patient in extreme pain with movement so not able to visualize back or sacral ulcer    MEDICATIONS  (STANDING):  acetaminophen     Tablet .. 650 milliGRAM(s) Oral every 6 hours  ascorbic acid 500 milliGRAM(s) Oral daily  ceFAZolin   IVPB 2000 milliGRAM(s) IV Intermittent every 8 hours (D5)     enoxaparin Injectable 40 milliGRAM(s) SubCutaneous every 12 hours  furosemide    Tablet 20 milliGRAM(s) Oral <User Schedule>  levothyroxine 137 MICROGram(s) Oral every 24 hours  lidocaine   4% Patch 1 Patch Transdermal daily  methocarbamol 500 milliGRAM(s) Oral every 8 hours  metoprolol succinate ER 12.5 milliGRAM(s) Oral every 24 hours  multivitamin 1 Tablet(s) Oral daily  nystatin Powder 1 Application(s) Topical two times a day  oxyCODONE    IR 5 milliGRAM(s) Oral every 4 hours  polyethylene glycol 3350 17 Gram(s) Oral every 24 hours  pregabalin 50 milliGRAM(s) Oral three times a day  senna 1 Tablet(s) Oral every 24 hours  zinc sulfate 220 milliGRAM(s) Oral daily    MEDICATIONS  (PRN):  HYDROmorphone  Injectable 1 milliGRAM(s) IV Push once PRN procedure    Allergies    Levaquin (Hives)    LABS:                        10.5   6.23  )-----------( 142      ( 19 Jul 2022 08:51 )             32.8     07-19    136  |  95<L>  |  19  ----------------------------<  101<H>  3.6   |  31  |  0.70    Ca    8.6      19 Jul 2022 08:51  Phos  2.9     07-19  Mg     1.9     07-19    BNP 4812    Anaerobic culture bottle grew Staph lugdunensis - switched to cefazolin 2g q8h as per ID recs    RADIOLOGY & ADDITIONAL TESTS:  Multilevel degenerative disc disease. Probable large central disc herniation T9/10 with partially calcified extruded disc material extending superiorly behind the T9 vertebral body and compressing the spinal cord. Alternative considerations would include a meningioma. This could be further evaluated with a contrast study.    Grade 1 retrolisthesis of L1 on L2 and anterolisthesis of L4 on L5. Multilevel degenerative disc disease with spinal stenosis and neural foramen narrowing as detailed above.    TTE 7/19: mild LVH w/ EF of 50%, Grade I LV diastolic dysfunction and mildly dilated LA.       ***INCOMPLETE***    INTERVAL HPI/OVERNIGHT EVENTS:  Patient was seen and examined at bedside. As per patient, continues to have severe sharp stabbing lower back pain with movement (severity 10/10). Same as yesterday. Patient denies: urinary or bowel incontinence, loss of sensation, paresthesias, headaches, nausea, vomiting, night sweats, chills, CP, SOB. She's having fluids but has mouth dryness. She's had no BM since admission.     Vitals:    T(F): 97.9 (20 Jul 2022 06:28), Max: 98.2 (19 Jul 2022 21:02)  HR: 61 (20 Jul 2022 06:28)  BP: 115/74 (20 Jul 2022 06:28)  RR: 18 (20 Jul 2022 06:28)  SpO2: 96% (20 Jul 2022 06:28) (93% - 98%)  temp max in last 48H T(F): , Max: 98.8 (07-18-22 @ 20:45)    PHYSICAL EXAM:    Constitutional: Patient is awake and alert; Morbidly obese; Appears to be resting comfortable when not moving.  HEENT: EOMI, CN II-XII intact, sclera non-icteric  Respiratory: CTA b/l, no wheezing, no rhonchi, no rales  Cardiovascular: normal S1S2; +3/6 systolic murmur loudest at the right upper sternal border that radiates to the carotids  Gastrointestinal: soft, NTND, no masses palpable; hyperactive bowel sounds; erythematous scaly patches along abdominal folds concerning for intertrigo covered in nystatin powder; bruising noted bilaterally in lower abdominal quadrants  Extremities: Cool, well perfused; radial and posterior tibial pulses present bilaterally; bandage covering lesion on L hip; intertrigo in popliteal fossa bilaterally; LE range of motion limited by pain b/l; no dermatomal sensation deficits noted in UEs or LEs; Reflexes hard to ascertain due to patient position; negative Babinksi sign; slightly decreased light touch sensation in toe phalanges concerning for peripheral neuropathy   Patient in extreme pain with movement so not able to visualize back or sacral ulcer    MEDICATIONS  (STANDING):  acetaminophen     Tablet .. 650 milliGRAM(s) Oral every 6 hours  ascorbic acid 500 milliGRAM(s) Oral daily  ceFAZolin   IVPB 2000 milliGRAM(s) IV Intermittent every 8 hours (D5)     enoxaparin Injectable 40 milliGRAM(s) SubCutaneous every 12 hours  furosemide    Tablet 20 milliGRAM(s) Oral <User Schedule>  levothyroxine 137 MICROGram(s) Oral every 24 hours  lidocaine   4% Patch 1 Patch Transdermal daily  methocarbamol 500 milliGRAM(s) Oral every 8 hours  metoprolol succinate ER 12.5 milliGRAM(s) Oral every 24 hours  multivitamin 1 Tablet(s) Oral daily  nystatin Powder 1 Application(s) Topical two times a day  oxyCODONE    IR 5 milliGRAM(s) Oral every 4 hours  polyethylene glycol 3350 17 Gram(s) Oral every 24 hours  pregabalin 50 milliGRAM(s) Oral three times a day  senna 1 Tablet(s) Oral every 24 hours  zinc sulfate 220 milliGRAM(s) Oral daily    MEDICATIONS  (PRN):  HYDROmorphone  Injectable 1 milliGRAM(s) IV Push once PRN procedure    Allergies    Levaquin (Hives)    LABS:                        10.5   6.23  )-----------( 142      ( 19 Jul 2022 08:51 )             32.8     07-19    136  |  95<L>  |  19  ----------------------------<  101<H>  3.6   |  31  |  0.70    Ca    8.6      19 Jul 2022 08:51  Phos  2.9     07-19  Mg     1.9     07-19    BNP 4812    Anaerobic culture bottle grew Staph lugdunensis - switched to cefazolin 2g q8h as per ID recs    RADIOLOGY & ADDITIONAL TESTS:  Multilevel degenerative disc disease. Probable large central disc herniation T9/10 with partially calcified extruded disc material extending superiorly behind the T9 vertebral body and compressing the spinal cord. Alternative considerations would include a meningioma. This could be further evaluated with a contrast study.    Grade 1 retrolisthesis of L1 on L2 and anterolisthesis of L4 on L5. Multilevel degenerative disc disease with spinal stenosis and neural foramen narrowing as detailed above.    TTE 7/19: mild LVH w/ EF of 50%, Grade I LV diastolic dysfunction and mildly dilated LA.       INTERVAL HPI/OVERNIGHT EVENTS:  Patient was seen and examined at bedside. As per patient, continues to have severe sharp stabbing lower back pain with movement (severity 10/10). Same as yesterday. Patient denies: urinary or bowel incontinence, loss of sensation, paresthesias, headaches, nausea, vomiting, night sweats, chills, CP, SOB. She's having fluids but has mouth dryness. She's had no BM since admission.     Vitals:  T(F): 97.9 (20 Jul 2022 06:28), Max: 98.2 (19 Jul 2022 21:02)  HR: 61 (20 Jul 2022 06:28)  BP: 115/74 (20 Jul 2022 06:28)  RR: 18 (20 Jul 2022 06:28)  SpO2: 96% (20 Jul 2022 06:28) (93% - 98%)  temp max in last 48H T(F): , Max: 98.8 (07-18-22 @ 20:45)    PHYSICAL EXAM:  Constitutional: Patient is awake and alert; Morbidly obese; Appears to be resting comfortable when not moving.  HEENT: EOMI, CN II-XII intact, sclera non-icteric  Respiratory: CTA b/l, no wheezing, no rhonchi, no rales  Cardiovascular: normal S1S2; +3/6 systolic murmur loudest at the right upper sternal border that radiates to the carotids  Gastrointestinal: soft, NTND, no masses palpable; hyperactive bowel sounds; erythematous scaly patches along abdominal folds concerning for intertrigo covered in nystatin powder; bruising noted bilaterally in lower abdominal quadrants  Extremities: Cool, well perfused; radial and posterior tibial pulses present bilaterally; bandage covering lesion on L hip; intertrigo in popliteal fossa bilaterally; LE range of motion limited by pain b/l; no dermatomal sensation deficits noted in UEs or LEs; Reflexes hard to ascertain due to patient position; negative Babinksi sign; slightly decreased light touch sensation in toe phalanges   Patient in extreme pain with movement so not able to visualize back or sacral ulcer    MEDICATIONS  (STANDING):  acetaminophen     Tablet .. 650 milliGRAM(s) Oral every 6 hours  ascorbic acid 500 milliGRAM(s) Oral daily  ceFAZolin   IVPB 2000 milliGRAM(s) IV Intermittent every 8 hours (D5)     enoxaparin Injectable 40 milliGRAM(s) SubCutaneous every 12 hours  furosemide    Tablet 20 milliGRAM(s) Oral <User Schedule>  levothyroxine 137 MICROGram(s) Oral every 24 hours  lidocaine   4% Patch 1 Patch Transdermal daily  methocarbamol 500 milliGRAM(s) Oral every 8 hours  metoprolol succinate ER 12.5 milliGRAM(s) Oral every 24 hours  multivitamin 1 Tablet(s) Oral daily  nystatin Powder 1 Application(s) Topical two times a day  oxyCODONE    IR 5 milliGRAM(s) Oral every 4 hours  polyethylene glycol 3350 17 Gram(s) Oral every 24 hours  pregabalin 50 milliGRAM(s) Oral three times a day  senna 1 Tablet(s) Oral every 24 hours  zinc sulfate 220 milliGRAM(s) Oral daily    MEDICATIONS  (PRN):  HYDROmorphone  Injectable 1 milliGRAM(s) IV Push once PRN procedure    Allergies    Levaquin (Hives)    LABS:                        10.5   6.23  )-----------( 142      ( 19 Jul 2022 08:51 )             32.8     07-19    136  |  95<L>  |  19  ----------------------------<  101<H>  3.6   |  31  |  0.70    Ca    8.6      19 Jul 2022 08:51  Phos  2.9     07-19  Mg     1.9     07-19    BNP 4812    07/14 Anaerobic culture bottle grew Staph lugdunensis - switched to cefazolin 2g q8h as per ID recs    RADIOLOGY & ADDITIONAL TESTS:  Multilevel degenerative disc disease. Probable large central disc herniation T9/10 with partially calcified extruded disc material extending superiorly behind the T9 vertebral body and compressing the spinal cord. Alternative considerations would include a meningioma. This could be further evaluated with a contrast study.    Grade 1 retrolisthesis of L1 on L2 and anterolisthesis of L4 on L5. Multilevel degenerative disc disease with spinal stenosis and neural foramen narrowing as detailed above.    TTE 7/19: mild LVH w/ EF of 50%, Grade I LV diastolic dysfunction and mildly dilated LA.

## 2022-07-20 NOTE — PROGRESS NOTE ADULT - SUBJECTIVE AND OBJECTIVE BOX
INTERVAL HPI/OVERNIGHT EVENTS: ZEN.    CONSTITUTIONAL:  Negative fever or chills, feels well, good appetite  EYES:  Negative  blurry vision or double vision  CARDIOVASCULAR:  Negative for chest pain or palpitations  RESPIRATORY:  Negative for cough, wheezing, or SOB   GASTROINTESTINAL:  Negative for nausea, vomiting, diarrhea, constipation, or abdominal pain  GENITOURINARY:  Negative frequency, urgency or dysuria  NEUROLOGIC:  No headache, confusion, dizziness, lightheadedness      ANTIBIOTICS/RELEVANT:    MEDICATIONS  (STANDING):  acetaminophen     Tablet .. 650 milliGRAM(s) Oral every 6 hours  ascorbic acid 500 milliGRAM(s) Oral daily  ceFAZolin   IVPB 2000 milliGRAM(s) IV Intermittent every 8 hours  ceFAZolin   IVPB      enoxaparin Injectable 40 milliGRAM(s) SubCutaneous every 12 hours  furosemide    Tablet 20 milliGRAM(s) Oral <User Schedule>  levothyroxine 137 MICROGram(s) Oral every 24 hours  lidocaine   4% Patch 1 Patch Transdermal daily  methocarbamol 500 milliGRAM(s) Oral every 8 hours  metoprolol succinate ER 12.5 milliGRAM(s) Oral every 24 hours  multivitamin 1 Tablet(s) Oral daily  nystatin Powder 1 Application(s) Topical two times a day  polyethylene glycol 3350 17 Gram(s) Oral every 24 hours  potassium phosphate IVPB 15 milliMole(s) IV Intermittent once  pregabalin 50 milliGRAM(s) Oral three times a day  senna 1 Tablet(s) Oral every 24 hours  zinc sulfate 220 milliGRAM(s) Oral daily    MEDICATIONS  (PRN):  HYDROmorphone  Injectable 0.25 milliGRAM(s) IV Push every 6 hours PRN Severe Pain (7 - 10)  HYDROmorphone  Injectable 1 milliGRAM(s) IV Push once PRN procedure  HYDROmorphone  Injectable 1 milliGRAM(s) IV Push once PRN procedure  oxyCODONE    IR 7.5 milliGRAM(s) Oral every 4 hours PRN Severe Pain (7 - 10)        Vital Signs Last 24 Hrs  T(C): 36.7 (20 Jul 2022 13:45), Max: 36.8 (19 Jul 2022 21:02)  T(F): 98.1 (20 Jul 2022 13:45), Max: 98.2 (19 Jul 2022 21:02)  HR: 75 (20 Jul 2022 15:28) (61 - 78)  BP: 105/71 (20 Jul 2022 15:28) (104/68 - 115/74)  BP(mean): --  RR: 18 (20 Jul 2022 13:45) (17 - 18)  SpO2: 95% (20 Jul 2022 13:45) (93% - 96%)    Parameters below as of 20 Jul 2022 13:45  Patient On (Oxygen Delivery Method): nasal cannula  O2 Flow (L/min): 3      PHYSICAL EXAM:  Constitutional: NAD  Eyes: MIKE, EOMI  Ear/Nose/Throat: no oral lesion, no sinus tenderness on percussion	  Neck: no JVD, no lymphadenopathy, supple  Respiratory: CTA fly  Cardiovascular: S1S2 RRR, no murmurs  Gastrointestinal:soft, (+) BS, no HSM  Extremities:no e/e/c  Vascular: DP Pulse:	right normal; left normal      LABS:                        9.8    6.06  )-----------( 146      ( 20 Jul 2022 08:14 )             30.3     07-20    136  |  95<L>  |  19  ----------------------------<  101<H>  3.9   |  30  |  0.79    Ca    8.4      20 Jul 2022 08:14  Phos  3.0     07-20  Mg     1.9     07-20            MICROBIOLOGY: reviewed    RADIOLOGY & ADDITIONAL STUDIES: reviewed

## 2022-07-20 NOTE — PROGRESS NOTE ADULT - ASSESSMENT
87 yo female with PMH HFpEF (diagnosed in last admission 7/9-12, last ECHO 7/9), sacral decubitus ulcer, who presented to ED w atraumatic back pain 1 day after hospital discharge for acute hypoxic resp failure 2/2 to CHF exacerbation, was admitted for pre renal HERNESTO, and severe back pain found to have +Bcx treated with cefazolin.    85 yo female with PMH HFpEF, sacral decubitus ulcer, who presented to ED w atraumatic back pain 1 day after hospital discharge for acute hypoxic resp failure 2/2 to CHF exacerbation, was admitted for pre renal HERNESTO, and severe back pain found to have +Bcx treated with cefazolin.

## 2022-07-21 PROBLEM — Z86.79 PERSONAL HISTORY OF OTHER DISEASES OF THE CIRCULATORY SYSTEM: Chronic | Status: ACTIVE | Noted: 2022-07-14

## 2022-07-21 LAB
ALBUMIN SERPL ELPH-MCNC: 2.7 G/DL — LOW (ref 3.3–5)
ALP SERPL-CCNC: 77 U/L — SIGNIFICANT CHANGE UP (ref 40–120)
ALT FLD-CCNC: 11 U/L — SIGNIFICANT CHANGE UP (ref 10–45)
ANION GAP SERPL CALC-SCNC: 10 MMOL/L — SIGNIFICANT CHANGE UP (ref 5–17)
ANISOCYTOSIS BLD QL: SLIGHT — SIGNIFICANT CHANGE UP
AST SERPL-CCNC: 77 U/L — HIGH (ref 10–40)
BASOPHILS # BLD AUTO: 0 K/UL — SIGNIFICANT CHANGE UP (ref 0–0.2)
BASOPHILS NFR BLD AUTO: 0 % — SIGNIFICANT CHANGE UP (ref 0–2)
BILIRUB SERPL-MCNC: 0.4 MG/DL — SIGNIFICANT CHANGE UP (ref 0.2–1.2)
BUN SERPL-MCNC: 18 MG/DL — SIGNIFICANT CHANGE UP (ref 7–23)
CALCIUM SERPL-MCNC: 8.5 MG/DL — SIGNIFICANT CHANGE UP (ref 8.4–10.5)
CHLORIDE SERPL-SCNC: 95 MMOL/L — LOW (ref 96–108)
CO2 SERPL-SCNC: 30 MMOL/L — SIGNIFICANT CHANGE UP (ref 22–31)
CREAT SERPL-MCNC: 0.76 MG/DL — SIGNIFICANT CHANGE UP (ref 0.5–1.3)
CULTURE RESULTS: SIGNIFICANT CHANGE UP
EGFR: 77 ML/MIN/1.73M2 — SIGNIFICANT CHANGE UP
EOSINOPHIL # BLD AUTO: 0 K/UL — SIGNIFICANT CHANGE UP (ref 0–0.5)
EOSINOPHIL NFR BLD AUTO: 0 % — SIGNIFICANT CHANGE UP (ref 0–6)
GLUCOSE SERPL-MCNC: 89 MG/DL — SIGNIFICANT CHANGE UP (ref 70–99)
HCT VFR BLD CALC: 30.8 % — LOW (ref 34.5–45)
HGB BLD-MCNC: 9.9 G/DL — LOW (ref 11.5–15.5)
HYPOCHROMIA BLD QL: SLIGHT — SIGNIFICANT CHANGE UP
LG PLATELETS BLD QL AUTO: SLIGHT — SIGNIFICANT CHANGE UP
LYMPHOCYTES # BLD AUTO: 2.11 K/UL — SIGNIFICANT CHANGE UP (ref 1–3.3)
LYMPHOCYTES # BLD AUTO: 33 % — SIGNIFICANT CHANGE UP (ref 13–44)
MACROCYTES BLD QL: SLIGHT — SIGNIFICANT CHANGE UP
MAGNESIUM SERPL-MCNC: 2 MG/DL — SIGNIFICANT CHANGE UP (ref 1.6–2.6)
MANUAL SMEAR VERIFICATION: SIGNIFICANT CHANGE UP
MCHC RBC-ENTMCNC: 31.5 PG — SIGNIFICANT CHANGE UP (ref 27–34)
MCHC RBC-ENTMCNC: 32.1 GM/DL — SIGNIFICANT CHANGE UP (ref 32–36)
MCV RBC AUTO: 98.1 FL — SIGNIFICANT CHANGE UP (ref 80–100)
METAMYELOCYTES # FLD: 2 % — HIGH (ref 0–0)
MICROCYTES BLD QL: SLIGHT — SIGNIFICANT CHANGE UP
MONOCYTES # BLD AUTO: 0.83 K/UL — SIGNIFICANT CHANGE UP (ref 0–0.9)
MONOCYTES NFR BLD AUTO: 13 % — SIGNIFICANT CHANGE UP (ref 2–14)
NEUTROPHILS # BLD AUTO: 3.33 K/UL — SIGNIFICANT CHANGE UP (ref 1.8–7.4)
NEUTROPHILS NFR BLD AUTO: 52 % — SIGNIFICANT CHANGE UP (ref 43–77)
NRBC # BLD: 0 /100 — SIGNIFICANT CHANGE UP (ref 0–0)
NRBC # BLD: SIGNIFICANT CHANGE UP /100 WBCS (ref 0–0)
OVALOCYTES BLD QL SMEAR: SLIGHT — SIGNIFICANT CHANGE UP
PHOSPHATE SERPL-MCNC: 3.4 MG/DL — SIGNIFICANT CHANGE UP (ref 2.5–4.5)
PLAT MORPH BLD: ABNORMAL
PLATELET # BLD AUTO: 152 K/UL — SIGNIFICANT CHANGE UP (ref 150–400)
POLYCHROMASIA BLD QL SMEAR: SLIGHT — SIGNIFICANT CHANGE UP
POTASSIUM SERPL-MCNC: 4.1 MMOL/L — SIGNIFICANT CHANGE UP (ref 3.5–5.3)
POTASSIUM SERPL-SCNC: 4.1 MMOL/L — SIGNIFICANT CHANGE UP (ref 3.5–5.3)
PROT SERPL-MCNC: 6.5 G/DL — SIGNIFICANT CHANGE UP (ref 6–8.3)
RBC # BLD: 3.14 M/UL — LOW (ref 3.8–5.2)
RBC # FLD: 18.9 % — HIGH (ref 10.3–14.5)
RBC BLD AUTO: ABNORMAL
SODIUM SERPL-SCNC: 135 MMOL/L — SIGNIFICANT CHANGE UP (ref 135–145)
SPECIMEN SOURCE: SIGNIFICANT CHANGE UP
SPHEROCYTES BLD QL SMEAR: SLIGHT — SIGNIFICANT CHANGE UP
WBC # BLD: 6.4 K/UL — SIGNIFICANT CHANGE UP (ref 3.8–10.5)
WBC # FLD AUTO: 6.4 K/UL — SIGNIFICANT CHANGE UP (ref 3.8–10.5)

## 2022-07-21 PROCEDURE — 99232 SBSQ HOSP IP/OBS MODERATE 35: CPT

## 2022-07-21 PROCEDURE — 99233 SBSQ HOSP IP/OBS HIGH 50: CPT | Mod: GC

## 2022-07-21 RX ADMIN — OXYCODONE HYDROCHLORIDE 7.5 MILLIGRAM(S): 5 TABLET ORAL at 03:17

## 2022-07-21 RX ADMIN — OXYCODONE HYDROCHLORIDE 7.5 MILLIGRAM(S): 5 TABLET ORAL at 15:05

## 2022-07-21 RX ADMIN — OXYCODONE HYDROCHLORIDE 7.5 MILLIGRAM(S): 5 TABLET ORAL at 07:30

## 2022-07-21 RX ADMIN — NYSTATIN CREAM 1 APPLICATION(S): 100000 CREAM TOPICAL at 06:52

## 2022-07-21 RX ADMIN — Medication 100 MILLIGRAM(S): at 12:35

## 2022-07-21 RX ADMIN — HYDROMORPHONE HYDROCHLORIDE 0.25 MILLIGRAM(S): 2 INJECTION INTRAMUSCULAR; INTRAVENOUS; SUBCUTANEOUS at 16:16

## 2022-07-21 RX ADMIN — HYDROMORPHONE HYDROCHLORIDE 0.25 MILLIGRAM(S): 2 INJECTION INTRAMUSCULAR; INTRAVENOUS; SUBCUTANEOUS at 10:13

## 2022-07-21 RX ADMIN — Medication 650 MILLIGRAM(S): at 08:00

## 2022-07-21 RX ADMIN — SENNA PLUS 1 TABLET(S): 8.6 TABLET ORAL at 21:18

## 2022-07-21 RX ADMIN — METHOCARBAMOL 500 MILLIGRAM(S): 500 TABLET, FILM COATED ORAL at 18:23

## 2022-07-21 RX ADMIN — Medication 650 MILLIGRAM(S): at 12:33

## 2022-07-21 RX ADMIN — Medication 650 MILLIGRAM(S): at 18:23

## 2022-07-21 RX ADMIN — HYDROMORPHONE HYDROCHLORIDE 0.25 MILLIGRAM(S): 2 INJECTION INTRAMUSCULAR; INTRAVENOUS; SUBCUTANEOUS at 16:31

## 2022-07-21 RX ADMIN — Medication 137 MICROGRAM(S): at 06:51

## 2022-07-21 RX ADMIN — ENOXAPARIN SODIUM 40 MILLIGRAM(S): 100 INJECTION SUBCUTANEOUS at 06:51

## 2022-07-21 RX ADMIN — ENOXAPARIN SODIUM 40 MILLIGRAM(S): 100 INJECTION SUBCUTANEOUS at 18:22

## 2022-07-21 RX ADMIN — NYSTATIN CREAM 1 APPLICATION(S): 100000 CREAM TOPICAL at 18:22

## 2022-07-21 RX ADMIN — Medication 500 MILLIGRAM(S): at 12:33

## 2022-07-21 RX ADMIN — Medication 100 MILLIGRAM(S): at 02:00

## 2022-07-21 RX ADMIN — Medication 650 MILLIGRAM(S): at 06:51

## 2022-07-21 RX ADMIN — OXYCODONE HYDROCHLORIDE 7.5 MILLIGRAM(S): 5 TABLET ORAL at 02:17

## 2022-07-21 RX ADMIN — Medication 650 MILLIGRAM(S): at 01:57

## 2022-07-21 RX ADMIN — OXYCODONE HYDROCHLORIDE 7.5 MILLIGRAM(S): 5 TABLET ORAL at 23:36

## 2022-07-21 RX ADMIN — Medication 50 MILLIGRAM(S): at 06:51

## 2022-07-21 RX ADMIN — Medication 50 MILLIGRAM(S): at 15:05

## 2022-07-21 RX ADMIN — Medication 12.5 MILLIGRAM(S): at 15:12

## 2022-07-21 RX ADMIN — Medication 100 MILLIGRAM(S): at 20:39

## 2022-07-21 RX ADMIN — Medication 650 MILLIGRAM(S): at 19:23

## 2022-07-21 RX ADMIN — Medication 650 MILLIGRAM(S): at 13:33

## 2022-07-21 RX ADMIN — Medication 50 MILLIGRAM(S): at 21:17

## 2022-07-21 RX ADMIN — HYDROMORPHONE HYDROCHLORIDE 0.25 MILLIGRAM(S): 2 INJECTION INTRAMUSCULAR; INTRAVENOUS; SUBCUTANEOUS at 09:58

## 2022-07-21 RX ADMIN — METHOCARBAMOL 500 MILLIGRAM(S): 500 TABLET, FILM COATED ORAL at 09:59

## 2022-07-21 RX ADMIN — ZINC SULFATE TAB 220 MG (50 MG ZINC EQUIVALENT) 220 MILLIGRAM(S): 220 (50 ZN) TAB at 12:33

## 2022-07-21 RX ADMIN — OXYCODONE HYDROCHLORIDE 7.5 MILLIGRAM(S): 5 TABLET ORAL at 16:05

## 2022-07-21 RX ADMIN — METHOCARBAMOL 500 MILLIGRAM(S): 500 TABLET, FILM COATED ORAL at 01:57

## 2022-07-21 RX ADMIN — Medication 650 MILLIGRAM(S): at 02:00

## 2022-07-21 RX ADMIN — Medication 1 TABLET(S): at 12:33

## 2022-07-21 RX ADMIN — LIDOCAINE 1 PATCH: 4 CREAM TOPICAL at 15:04

## 2022-07-21 RX ADMIN — LIDOCAINE 1 PATCH: 4 CREAM TOPICAL at 21:16

## 2022-07-21 RX ADMIN — OXYCODONE HYDROCHLORIDE 7.5 MILLIGRAM(S): 5 TABLET ORAL at 06:50

## 2022-07-21 RX ADMIN — POLYETHYLENE GLYCOL 3350 17 GRAM(S): 17 POWDER, FOR SOLUTION ORAL at 10:55

## 2022-07-21 NOTE — ADVANCED PRACTICE NURSE CONSULT - REASON FOR CONSULT
Reported stage-2 on sacrum, present on arrival    Reason for Admission: HERNESTO and pain management  History of Present Illness:   Patient is an 86 F with PMH HFpEF (last ECHO 7/9) , HTN, RA, hypothyroidism, and sacral decubitus ulcer who presented to ED w atraumatic back pain. Pt reports pain occurred after quick movement following a nap yesterday. Patient reports feeling a sharp stabbing pain over b/l lower back. Pain does not radiate. Pain is worse with movement, however feels a dully, aching pain at rest. Patient has never felt a pain like this before. Patient denies any weight changes, fever/chills, nausea, vomiting, diaphoresis, headache, sore throat, chest pain, palpitations, LE edema, dyspnea, cough, wheezing, changes in appetite, constipation, diarrhea, abdominal pain, burning on urination, urinary frequency, dizziness, or fainting/LOC.   Patient reports mother with breast cancer and father with metastatic colon cancer.     Of note, pt was admitted to [facility] from 7/9-7/12 for acute hypoxic respiratory failure 2/2 CHF exacerbation

## 2022-07-21 NOTE — PROGRESS NOTE ADULT - ASSESSMENT
per Internal Medicine    86 y o female with PMH HFpEF, sacral decubitus ulcer, who presented to ED w atraumatic back pain 1 day after hospital discharge for acute hypoxic resp failure 2/2 to CHF exacerbation, was admitted for pre renal HERNESTO, and severe back pain found to have +Bcx treated with cefazolin.       Problem/Plan - 1:  ·  Problem: Acute back pain.   ·  Plan: Presented with Acute back pain after quick movement   - CT followed by MRI of Lumbar and Thoracic spine with no contrast: showed Multilevel degenerative disc disease. Probable large central disc herniation T9/10 with partially calcified extruded disc material extending superiorly behind the T9 vertebral body and compressing the spinal cord.  - Neurosurgery recs (consulted for severe stenosis and possible meningioma): didn't suggest surgery at this time, will notify neurosurg of any change in neuro exam  - Pain control as recommended by pain mgmt team: lidocaine patch daily, robaxin 500 mg q8h, Tylenol 650 mg q6h, oxycodone 7.5 mg q6h PRN for severe pain, dilaudid 0.25mg q4 PRN for breakthrough pain, pregabalin 50 mg 3x daily.    Problem/Plan - 2:  ·  Problem: Gram-positive bacteremia.   ·  Plan: admission bloodwork showed no leukocytosis (WBC: 6.43) with bandemia 11%. Patient was afebrile, stable vitals with no sx of active infection.   - Bcx positive for Staph lungdunensis (7/14)  - Bcx from 7/16 and 7/17 showed no growth   - ID recs: Gallium scan to find source - Gallium scan completed. f/u with report  - Continuing IV cefazolin (D6) as per ID recs (transitioned from vanc)  - UA negative for UTI, RVP negative; no focal consolidations on CXR  - TTE showed no evidence of endocarditis.    Problem/Plan - 3:  ·  Problem: Acute respiratory failure with hypoxia.   ·  Plan: SpO2 drops to 85% on RA  - O2 raised from 2 to 3 L NC due to SpO2 drop overnight  - CXR - no acute focal opacity, no interval change from last admission  - Lasix 40mg IVP once today.    Problem/Plan - 4:  ·  Problem: Pressure ulcer.   ·  Plan: Pt with known pressure ulcer on sacrum   - stage 2   - c/w wound care recs.    Problem/Plan - 5:  ·  Problem: (HFpEF) heart failure with preserved ejection fraction.   ·  Plan: Patient on Lasix 20 mg PO M/W/F and losartan 25 mg qd; Toprol 12.5 qd at home  - recent admission to [facility] due to HF exacerbation   - c/w home meds toprol  - Restarted lasix and continued to hold losartan in the setting of resolved HERNESTO and bacteremia  - CXR - no acute focal opacity, no interval change from last admission  - Checking daily weights  - DASH/TLC diet  - f/u outpatient HF team  - Echo showed evidence of mild LVH w/ EF of 50%, Grade I LV diastolic dysfunction and mildly dilated LA.    Problem/Plan - 6:  ·  Problem: Anemia.   ·  Plan: Hgb: 9.8 with RDW 19.2, ferritin 793  Consistent with anemia of chronic disease with unknown source (possibly HF, obesity)  - monitor CBC daily.    Problem/Plan - 7:  ·  Problem: HERNESTO (acute kidney injury).   ·  Plan: Resolved. Patient recently admitted for CHF. Since discharge, significantly decreased fluid intake. In ED: BUN: 60; Cr: 2.03  - most likely prerenal 2/2 to reduced PO intake and in the setting of lasix use   - unremarkable UA   - encouraged increased PO fluid intake   - Restarted lasix but continuing to hold losartan.    Problem/Plan - 8:  ·  Problem: Intertrigo.   ·  Plan: Erythematous scaly patches present diffusely in skin folds  - Treated w/ nystatin powder.    Problem/Plan - 9:  ·  Problem: Pancreatic mass.   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 Pt reports not having follow up in years.   - CEA and  not elevated  -f/up outpatient for abdominal MRI (pancreatic protocol, without and with contrast).    Problem/Plan - 10:  ·  Problem: Thrombocytopenia.   ·  Plan; platelets: 140 at admission and consistently low  most likely reactive   - continue to trend with CBC.    Problem/Plan - 11:  ·  Problem: Pulmonary nodule.   ·  Plan: -6 mm solid L lower lobe nodule; no hx smoking   -f/up CT chest at 6-12 mos outpatient.    Problem/Plan - 12:  ·  Problem: Morbid obesity.   ·  Plan: Patient has BMI of 48.3  - Nutrition recs followed.

## 2022-07-21 NOTE — PROGRESS NOTE ADULT - ASSESSMENT
87 yo female with PMH HFpEF, sacral decubitus ulcer, who presented to ED w atraumatic back pain 1 day after hospital discharge for acute hypoxic resp failure 2/2 to CHF exacerbation, was admitted for pre renal HERNESTO, and severe back pain found to have +Bcx treated with cefazolin.

## 2022-07-21 NOTE — PROGRESS NOTE ADULT - PROBLEM SELECTOR PLAN 2
admission bloodwork showed no leukocytosis (WBC: 6.43) with bandemia 11%. Patient was afebrile, stable vitals with no sx of active infection.   - Bcx positive for Staph lungdunensis (7/14)  - Bcx from 7/16 and 7/17 showed no growth   - ID recs: Gallium scan to find source - Gallium scan started. f/u with results   - Continuing IV cefazolin (D5) as per ID recs (transitioned from vanc)  - UA negative for UTI, RVP negative; no focal consolidations on CXR  - TTE showed no evidence of endocarditis admission bloodwork showed no leukocytosis (WBC: 6.43) with bandemia 11%. Patient was afebrile, stable vitals with no sx of active infection.   - Bcx positive for Staph lungdunensis (7/14)  - Bcx from 7/16 and 7/17 showed no growth   - ID recs: Gallium scan to find source - Gallium scan completed. f/u with report  - Continuing IV cefazolin (D6) as per ID recs (transitioned from vanc)  - UA negative for UTI, RVP negative; no focal consolidations on CXR  - TTE showed no evidence of endocarditis

## 2022-07-21 NOTE — PROGRESS NOTE ADULT - TIME BILLING
Management of bacteremia
treatment of staph lugdunensis infection
Management of bacteremia
Dispo: pending further work-up
Dispo: pending MRI, cx data, ID and Neurosx recs

## 2022-07-21 NOTE — PROGRESS NOTE ADULT - PROBLEM SELECTOR PLAN 11
-6 mm solid L lower lobe nodule; no hx smoking   -fup CT chest at 6-12 mos outpatient -6 mm solid L lower lobe nodule; no hx smoking   -f/up CT chest at 6-12 mos outpatient

## 2022-07-21 NOTE — PROGRESS NOTE ADULT - SUBJECTIVE AND OBJECTIVE BOX
Physical Medicine and Rehabilitation Progress Note :    Patient is a 86y old  Female who presents with a chief complaint of HERNESTO and pain management (21 Jul 2022 12:45)      HPI:  Patient is an 86 F with PMH HFpEF (last ECHO 7/9) , HTN, RA, hypothyroidism, and sacral decubitus ulcer who presented to ED w atraumatic back pain. Pt reports pain occurred after quick movement following a nap yesterday. Patient reports feeling a sharp stabbing pain over b/l lower back. Pain does not radiate. Pain is worse with movement, however feels a dully, aching pain at rest. Patient has never felt a pain like this before. Patient denies any weight changes, fever/chills, nausea, vomiting, diaphoresis, headache, sore throat, chest pain, palpitations, LE edema, dyspnea, cough, wheezing, changes in appetite, constipation, diarrhea, abdominal pain, burning on urination, urinary frequency, dizziness, or fainting/LOC.   Patient reports mother with breast cancer and father with metastatic colon cancer.     Of note, pt was admitted to [facility] from 7/9-7/12 for acute hypoxic respiratory failure 2/2 CHF exacerbation    In ED: T: 98.5; HR: 78; BP: 106/52; RR: 18; 97% on 2L NC  Labs: WBC: 6.43 with 11% bandemia (neut); RBC: 3.26; Hgb: 10.4; Hct: 31.5; RDW: 20.7; PLT: 140; BUN: 60; Cr: 2.03 (baseline 0.9); GFR: 24; CRP: 21; lactate: 1.1  UA: negative; RVP: negative   Imaging:   CT abd/pelvis: 1. Large complex cystic mass in the upper abdomen measuring up to 12.1 cm which appears to arise from the body of the pancreas. Further characterization   with nonemergent abdominal MRI (pancreatic protocol, without and with contrast) is recommended.  2. Incidental 6 mm solid left lower lobe nodule. See follow up recommendations below.  3. Sigmoid diverticulosis without evidence of diverticulitis.  CT lumbar spine: 1. No acute abnormality in the lumbar spine.  2. Advanced lumbar spondylosis, most severe at L4-L5 with grade 1 anterolisthesis contributing to spinal stenosis; more moderate spinal stenosis at L1-2 from posterior osteophyte ridge; see additional  detail above. Also, spinal stenosis at T9-10 with ventral calcified focus that may be calcified disc extrusion or possible meningioma.             (14 Jul 2022 12:14)                            9.9    6.40  )-----------( 152      ( 21 Jul 2022 08:55 )             30.8       07-21    135  |  95<L>  |  18  ----------------------------<  89  4.1   |  30  |  0.76    Ca    8.5      21 Jul 2022 08:54  Phos  3.4     07-21  Mg     2.0     07-21    TPro  6.5  /  Alb  2.7<L>  /  TBili  0.4  /  DBili  x   /  AST  77<H>  /  ALT  11  /  AlkPhos  77  07-21    Vital Signs Last 24 Hrs  T(C): 36.8 (21 Jul 2022 12:42), Max: 36.9 (21 Jul 2022 05:14)  T(F): 98.2 (21 Jul 2022 12:42), Max: 98.4 (21 Jul 2022 05:14)  HR: 61 (21 Jul 2022 12:42) (61 - 80)  BP: 118/73 (21 Jul 2022 12:42) (105/71 - 143/71)  BP(mean): --  RR: 18 (21 Jul 2022 12:42) (18 - 19)  SpO2: 98% (21 Jul 2022 12:42) (96% - 98%)    Parameters below as of 21 Jul 2022 12:42  Patient On (Oxygen Delivery Method): nasal cannula  O2 Flow (L/min): 3      MEDICATIONS  (STANDING):  acetaminophen     Tablet .. 650 milliGRAM(s) Oral every 6 hours  ascorbic acid 500 milliGRAM(s) Oral daily  ceFAZolin   IVPB 2000 milliGRAM(s) IV Intermittent every 8 hours  ceFAZolin   IVPB      enoxaparin Injectable 40 milliGRAM(s) SubCutaneous every 12 hours  furosemide    Tablet 20 milliGRAM(s) Oral <User Schedule>  levothyroxine 137 MICROGram(s) Oral every 24 hours  lidocaine   4% Patch 1 Patch Transdermal daily  methocarbamol 500 milliGRAM(s) Oral every 8 hours  metoprolol succinate ER 12.5 milliGRAM(s) Oral every 24 hours  multivitamin 1 Tablet(s) Oral daily  nystatin Powder 1 Application(s) Topical two times a day  polyethylene glycol 3350 17 Gram(s) Oral every 24 hours  pregabalin 50 milliGRAM(s) Oral three times a day  senna 1 Tablet(s) Oral every 24 hours  zinc sulfate 220 milliGRAM(s) Oral daily    MEDICATIONS  (PRN):  HYDROmorphone  Injectable 0.25 milliGRAM(s) IV Push every 6 hours PRN Severe Pain (7 - 10)  HYDROmorphone  Injectable 1 milliGRAM(s) IV Push once PRN procedure  HYDROmorphone  Injectable 1 milliGRAM(s) IV Push once PRN procedure  oxyCODONE    IR 7.5 milliGRAM(s) Oral every 4 hours PRN Severe Pain (7 - 10)    Currently Undergoing Physical/ Occupational Therapy at bedside    PT/OT Functional Status Assessment :   7/20/2022      Pain Assessment/Number Scale (0-10) Adult  Presence of Pain: complains of pain/discomfort  Body Location: back  Pain Rating (0-10): Rest: 3   Pain Rating (0-10): Activity: 8   Pain Alleviating Factors: repositioning;  rest    Therapeutic Interventions      Bed Mobility  Bed Mobility Training Rolling/Turning: maximum assist (25% patient effort);  2 person assist;  hand over hand;  set-up required;  supervision;  verbal cues;  nonverbal cues (demo/gestures)  Bed Mobility Training Scooting: maximum assist (25% patient effort);  2 person assist  Bed Mobility Training Sit-to-Supine: dependent (less than 25% patient effort);  2 person assist  Bed Mobility Training Supine-to-Sit: dependent (less than 25% patient effort);  2 person assist;  to long sit - too much pain - patient returned to supine   Bed Mobility Training Limitations: impaired ability to control trunk for mobility;  pain;  impaired balance;  decreased strength    Therapeutic Exercise  Therapeutic Exercise Detail: supine ankle pumps, heel slides, quad sets, glute sets x 10 reps           PM&R Impression : as above    Current Disposition Plan Recommendations :    subacute rehab placement

## 2022-07-21 NOTE — PROGRESS NOTE ADULT - PROBLEM SELECTOR PLAN 6
Hgb: 9.8 with RDW 19.2, ferritin 793  Consistent with anemia of chronic disease with unknown source (possibly HF, obesity)  - monitor CBC daily

## 2022-07-21 NOTE — PROGRESS NOTE ADULT - SUBJECTIVE AND OBJECTIVE BOX
***INCOMPLETE***    INTERVAL HPI/OVERNIGHT EVENTS:  Patient was seen and examined at bedside. As per patient, continues to have severe sharp stabbing lower back pain with movement (severity 10/10). Same as yesterday. Patient denies: urinary or bowel incontinence, loss of sensation, paresthesias, headaches, nausea, vomiting, night sweats, chills, CP, SOB. She's having fluids but has mouth dryness. She's had no BM since admission.     Vitals:  T(F): 98.4 (21 Jul 2022 05:14), Max: 98.4 (21 Jul 2022 05:14)  HR: 80 (21 Jul 2022 05:14)  BP: 143/71 (21 Jul 2022 05:14)  RR: 19 (21 Jul 2022 05:14)  SpO2: 98% (21 Jul 2022 05:14) (95% - 98%)  temp max in last 48H T(F): , Max: 98.4 (07-21-22 @ 05:14)    PHYSICAL EXAM:  Constitutional: Patient is awake and alert; Morbidly obese; Appears to be resting comfortable when not moving.  HEENT: EOMI, CN II-XII intact, sclera non-icteric  Respiratory: CTA b/l, no wheezing, no rhonchi, no rales  Cardiovascular: normal S1S2; +3/6 systolic murmur loudest at the right upper sternal border that radiates to the carotids  Gastrointestinal: soft, NTND, no masses palpable; hyperactive bowel sounds; erythematous scaly patches along abdominal folds concerning for intertrigo covered in nystatin powder; bruising noted bilaterally in lower abdominal quadrants  Extremities: Cool, well perfused; radial and posterior tibial pulses present bilaterally; bandage covering lesion on L hip; intertrigo in popliteal fossa bilaterally; LE range of motion limited by pain b/l; no dermatomal sensation deficits noted in UEs or LEs; Reflexes hard to ascertain due to patient position; negative Babinksi sign; slightly decreased light touch sensation in toe phalanges   Patient in extreme pain with movement so not able to visualize back or sacral ulcer    MEDICATIONS  (STANDING):  acetaminophen     Tablet .. 650 milliGRAM(s) Oral every 6 hours  ascorbic acid 500 milliGRAM(s) Oral daily  ceFAZolin   IVPB 2000 milliGRAM(s) IV Intermittent every 8 hours (D6)     enoxaparin Injectable 40 milliGRAM(s) SubCutaneous every 12 hours  furosemide    Tablet 20 milliGRAM(s) Oral <User Schedule>  levothyroxine 137 MICROGram(s) Oral every 24 hours  lidocaine   4% Patch 1 Patch Transdermal daily  methocarbamol 500 milliGRAM(s) Oral every 8 hours  metoprolol succinate ER 12.5 milliGRAM(s) Oral every 24 hours  multivitamin 1 Tablet(s) Oral daily  nystatin Powder 1 Application(s) Topical two times a day  polyethylene glycol 3350 17 Gram(s) Oral every 24 hours  pregabalin 50 milliGRAM(s) Oral three times a day  senna 1 Tablet(s) Oral every 24 hours  zinc sulfate 220 milliGRAM(s) Oral daily    MEDICATIONS  (PRN):  HYDROmorphone  Injectable 0.25 milliGRAM(s) IV Push every 6 hours PRN Severe Pain (7 - 10)  HYDROmorphone  Injectable 1 milliGRAM(s) IV Push once PRN procedure  HYDROmorphone  Injectable 1 milliGRAM(s) IV Push once PRN procedure  oxyCODONE    IR 7.5 milliGRAM(s) Oral every 4 hours PRN Severe Pain (7 - 10)    Allergies    Levaquin (Hives)    LABS:                          9.9    6.40  )-----------( 152      ( 21 Jul 2022 08:55 )             30.8     07-21    135  |  95<L>  |  18  ----------------------------<  89  4.1   |  30  |  0.76    Ca    8.5      21 Jul 2022 08:54  Phos  3.4     07-21  Mg     2.0     07-21    TPro  6.5  /  Alb  2.7<L>  /  TBili  0.4  /  DBili  x   /  AST  77<H>  /  ALT  11  /  AlkPhos  77  07-21    LIVER FUNCTIONS - ( 21 Jul 2022 08:54 )  Alb: 2.7 g/dL / Pro: 6.5 g/dL / ALK PHOS: 77 U/L / ALT: 11 U/L / AST: 77 U/L / GGT: x           BNP 4812    07/14 Anaerobic culture bottle grew Staph lugdunensis - switched to cefazolin 2g q8h as per ID recs    RADIOLOGY & ADDITIONAL TESTS:  Multilevel degenerative disc disease. Probable large central disc herniation T9/10 with partially calcified extruded disc material extending superiorly behind the T9 vertebral body and compressing the spinal cord. Alternative considerations would include a meningioma. This could be further evaluated with a contrast study.    Grade 1 retrolisthesis of L1 on L2 and anterolisthesis of L4 on L5. Multilevel degenerative disc disease with spinal stenosis and neural foramen narrowing as detailed above.    TTE 7/19: mild LVH w/ EF of 50%, Grade I LV diastolic dysfunction and mildly dilated LA.       ***INCOMPLETE***    INTERVAL HPI/OVERNIGHT EVENTS:  Patient was seen and examined at bedside. As per patient, continues to have severe sharp stabbing lower back pain with movement (severity 10/10). Same as yesterday. Patient denies: urinary or bowel incontinence, loss of sensation, paresthesias, headaches, nausea, vomiting, night sweats, chills, CP, SOB. She's having fluids. She's had no BM since admission.     Vitals:  T(F): 98.4 (21 Jul 2022 05:14), Max: 98.4 (21 Jul 2022 05:14)  HR: 80 (21 Jul 2022 05:14)  BP: 143/71 (21 Jul 2022 05:14)  RR: 19 (21 Jul 2022 05:14)  SpO2: 98% (21 Jul 2022 05:14) (95% - 98%)  temp max in last 48H T(F): , Max: 98.4 (07-21-22 @ 05:14)    PHYSICAL EXAM:  Constitutional: Patient is awake and alert; Morbidly obese; Appears to be resting comfortable when not moving.  HEENT: EOMI, CN II-XII intact, sclera non-icteric  Respiratory: CTA b/l, no wheezing, no rhonchi, no rales  Cardiovascular: normal S1S2; +3/6 systolic murmur loudest at the right upper sternal border that radiates to the carotids  Gastrointestinal: soft, NTND, no masses palpable; hyperactive bowel sounds; improving erythematous scaly patches along abdominal folds concerning for intertrigo covered in nystatin powder; bruising noted bilaterally in lower abdominal quadrants  Extremities: warm, well perfused; radial and posterior tibial pulses present bilaterally; bandage covering lesion on L hip; intertrigo in popliteal fossa bilaterally; LE range of motion limited by pain b/l; no dermatomal sensation deficits noted in UEs or LEs; Reflexes hard to ascertain due to patient position; slightly decreased light touch sensation in toe phalanges   Patient in extreme pain with movement so not able to visualize back or sacral ulcer    MEDICATIONS  (STANDING):  acetaminophen     Tablet .. 650 milliGRAM(s) Oral every 6 hours  ascorbic acid 500 milliGRAM(s) Oral daily  ceFAZolin   IVPB 2000 milliGRAM(s) IV Intermittent every 8 hours (D6)     enoxaparin Injectable 40 milliGRAM(s) SubCutaneous every 12 hours  furosemide    Tablet 20 milliGRAM(s) Oral <User Schedule>  levothyroxine 137 MICROGram(s) Oral every 24 hours  lidocaine   4% Patch 1 Patch Transdermal daily  methocarbamol 500 milliGRAM(s) Oral every 8 hours  metoprolol succinate ER 12.5 milliGRAM(s) Oral every 24 hours  multivitamin 1 Tablet(s) Oral daily  nystatin Powder 1 Application(s) Topical two times a day  polyethylene glycol 3350 17 Gram(s) Oral every 24 hours  pregabalin 50 milliGRAM(s) Oral three times a day  senna 1 Tablet(s) Oral every 24 hours  zinc sulfate 220 milliGRAM(s) Oral daily    MEDICATIONS  (PRN):  HYDROmorphone  Injectable 0.25 milliGRAM(s) IV Push every 6 hours PRN Severe Pain (7 - 10)  HYDROmorphone  Injectable 1 milliGRAM(s) IV Push once PRN procedure  HYDROmorphone  Injectable 1 milliGRAM(s) IV Push once PRN procedure  oxyCODONE    IR 7.5 milliGRAM(s) Oral every 4 hours PRN Severe Pain (7 - 10)    Allergies    Levaquin (Hives)    LABS:                          9.9    6.40  )-----------( 152      ( 21 Jul 2022 08:55 )             30.8     07-21    135  |  95<L>  |  18  ----------------------------<  89  4.1   |  30  |  0.76    Ca    8.5      21 Jul 2022 08:54  Phos  3.4     07-21  Mg     2.0     07-21    TPro  6.5  /  Alb  2.7<L>  /  TBili  0.4  /  DBili  x   /  AST  77<H>  /  ALT  11  /  AlkPhos  77  07-21    LIVER FUNCTIONS - ( 21 Jul 2022 08:54 )  Alb: 2.7 g/dL / Pro: 6.5 g/dL / ALK PHOS: 77 U/L / ALT: 11 U/L / AST: 77 U/L / GGT: x           BNP 4812    07/14 Anaerobic culture bottle grew Staph lugdunensis - switched to cefazolin 2g q8h as per ID recs    RADIOLOGY & ADDITIONAL TESTS:  Multilevel degenerative disc disease. Probable large central disc herniation T9/10 with partially calcified extruded disc material extending superiorly behind the T9 vertebral body and compressing the spinal cord. Alternative considerations would include a meningioma. This could be further evaluated with a contrast study.    Grade 1 retrolisthesis of L1 on L2 and anterolisthesis of L4 on L5. Multilevel degenerative disc disease with spinal stenosis and neural foramen narrowing as detailed above.    TTE 7/19: mild LVH w/ EF of 50%, Grade I LV diastolic dysfunction and mildly dilated LA.       INTERVAL HPI/OVERNIGHT EVENTS:  Patient was seen and examined at bedside. As per patient, continues to have severe sharp stabbing lower back pain with movement (severity 10/10). Same as yesterday. Patient denies: urinary or bowel incontinence, loss of sensation, paresthesias, headaches, nausea, vomiting, night sweats, chills, CP, SOB. She's having fluids. She's had no BM since admission.     Vitals:  T(F): 98.4 (21 Jul 2022 05:14), Max: 98.4 (21 Jul 2022 05:14)  HR: 80 (21 Jul 2022 05:14)  BP: 143/71 (21 Jul 2022 05:14)  RR: 19 (21 Jul 2022 05:14)  SpO2: 98% (21 Jul 2022 05:14) (95% - 98%)  temp max in last 48H T(F): , Max: 98.4 (07-21-22 @ 05:14)    PHYSICAL EXAM:  Constitutional: Patient is awake and alert; Morbidly obese; Appears to be resting comfortable when not moving.  HEENT: EOMI, CN II-XII intact, sclera non-icteric  Respiratory: CTA b/l, no wheezing, no rhonchi, no rales  Cardiovascular: normal S1S2; +3/6 systolic murmur loudest at the right upper sternal border that radiates to the carotids  Gastrointestinal: soft, NTND, no masses palpable; hyperactive bowel sounds; improving erythematous scaly patches along abdominal folds concerning for intertrigo covered in nystatin powder; bruising noted bilaterally in lower abdominal quadrants  Extremities: warm, well perfused; radial and posterior tibial pulses present bilaterally; bandage covering lesion on L hip; intertrigo in popliteal fossa bilaterally; LE range of motion limited by pain b/l; no dermatomal sensation deficits noted in UEs or LEs; Reflexes hard to ascertain due to patient position; slightly decreased light touch sensation in toe phalanges   Patient in extreme pain with movement so not able to visualize back or sacral ulcer    MEDICATIONS  (STANDING):  acetaminophen     Tablet .. 650 milliGRAM(s) Oral every 6 hours  ascorbic acid 500 milliGRAM(s) Oral daily  ceFAZolin   IVPB 2000 milliGRAM(s) IV Intermittent every 8 hours (D6)     enoxaparin Injectable 40 milliGRAM(s) SubCutaneous every 12 hours  furosemide    Tablet 20 milliGRAM(s) Oral <User Schedule>  levothyroxine 137 MICROGram(s) Oral every 24 hours  lidocaine   4% Patch 1 Patch Transdermal daily  methocarbamol 500 milliGRAM(s) Oral every 8 hours  metoprolol succinate ER 12.5 milliGRAM(s) Oral every 24 hours  multivitamin 1 Tablet(s) Oral daily  nystatin Powder 1 Application(s) Topical two times a day  polyethylene glycol 3350 17 Gram(s) Oral every 24 hours  pregabalin 50 milliGRAM(s) Oral three times a day  senna 1 Tablet(s) Oral every 24 hours  zinc sulfate 220 milliGRAM(s) Oral daily    MEDICATIONS  (PRN):  HYDROmorphone  Injectable 0.25 milliGRAM(s) IV Push every 6 hours PRN Severe Pain (7 - 10)  HYDROmorphone  Injectable 1 milliGRAM(s) IV Push once PRN procedure  HYDROmorphone  Injectable 1 milliGRAM(s) IV Push once PRN procedure  oxyCODONE    IR 7.5 milliGRAM(s) Oral every 4 hours PRN Severe Pain (7 - 10)    Allergies    Levaquin (Hives)    LABS:                          9.9    6.40  )-----------( 152      ( 21 Jul 2022 08:55 )             30.8     07-21    135  |  95<L>  |  18  ----------------------------<  89  4.1   |  30  |  0.76    Ca    8.5      21 Jul 2022 08:54  Phos  3.4     07-21  Mg     2.0     07-21    TPro  6.5  /  Alb  2.7<L>  /  TBili  0.4  /  DBili  x   /  AST  77<H>  /  ALT  11  /  AlkPhos  77  07-21    LIVER FUNCTIONS - ( 21 Jul 2022 08:54 )  Alb: 2.7 g/dL / Pro: 6.5 g/dL / ALK PHOS: 77 U/L / ALT: 11 U/L / AST: 77 U/L / GGT: x           BNP 4812    07/14 Anaerobic culture bottle grew Staph lugdunensis - switched to cefazolin 2g q8h as per ID recs    RADIOLOGY & ADDITIONAL TESTS:  Multilevel degenerative disc disease. Probable large central disc herniation T9/10 with partially calcified extruded disc material extending superiorly behind the T9 vertebral body and compressing the spinal cord. Alternative considerations would include a meningioma. This could be further evaluated with a contrast study.    Grade 1 retrolisthesis of L1 on L2 and anterolisthesis of L4 on L5. Multilevel degenerative disc disease with spinal stenosis and neural foramen narrowing as detailed above.    TTE 7/19: mild LVH w/ EF of 50%, Grade I LV diastolic dysfunction and mildly dilated LA.

## 2022-07-21 NOTE — PROGRESS NOTE ADULT - PROBLEM SELECTOR PLAN 8
Erythematous scaly patches present diffusely in skin folds  - Treated w/ nystatin powder Erythematous scaly patches present diffusely in skin folds  - Treated w/ nystatin powder  - Wound care f/u

## 2022-07-21 NOTE — PROGRESS NOTE ADULT - PROBLEM SELECTOR PLAN 5
Patient on Lasix 20 mg PO M/W/F and losartan 25 mg qd; Toprol 12.5 qd at home  - recent admission to [facility] due to HF exacerbation   - c/w home meds toprol  - Restarted lasix and continued to hold losartan in the setting of resolved HERNESTO and bacteremia  - CXR - no acute focal opacity, no interval change from last admission  - Checking daily weights  - DASH/TLC diet  - f/u outpatient HF team  - Echo showed evidence of mild LVH w/ EF of 50%, Grade I LV diastolic dysfunction and mildly dilated LA

## 2022-07-21 NOTE — PROGRESS NOTE ADULT - ASSESSMENT
IMPRESSION:  85 yo female with morbid obesity, recent admission for HF, discharged two days ago, now presents with severe acute LBP, bandemia, found to have S. lugdunensis BSI in setting of MRI showing degenerative disc disease, spinal stenosis, and T9-10 herniation with cord compression; no overt findings of osteomyelitis or discitis on MRI.  Gallium scan with likely OM at L1    Recommend:  1.  Continue Cefazolin 2 grams IV q8hrs x 6 weeks total (day 1 = 7/16/22)  2.  Check ESR/CRP in AM on 7/22  3.  Needs weekly CBC, CMP, ESR, CRP.  Fax to Dr. Nicholson:  [phone]  4.  Can follow up with Dr. Nicholson:  [address], [address] floor [phone], option 2.  An appointment has been made for Monday August 1st at 3 pm    ID team 2 will sign off.  Reconsult as needed

## 2022-07-21 NOTE — ADVANCED PRACTICE NURSE CONSULT - ASSESSMENT
Patient with MASD to intergluteal cleft with blanchable erythema. Skin is eroded at base of cleft. Patient also noted to have ITD, which is being managed with nystatin powder with success; no erythema noted.

## 2022-07-21 NOTE — PROGRESS NOTE ADULT - PROBLEM SELECTOR PLAN 1
Presented with Acute back pain after quick movement   - CT followed by MRI of Lumbar and Thoracic spine with no contrast: showed Multilevel degenerative disc disease. Probable large central disc herniation T9/10 with partially calcified extruded disc material extending superiorly behind the T9 vertebral body and compressing the spinal cord.  - Neurosurgery recs (consulted for severe stenosis and possible meningioma): didn't suggest surgery at this time, will notify neurosurg of any change in neuro exam  - Pain control as recommended by pain mgmt team: lidocaine patch daily, robaxin 500 mg q8h, Tylenol 650 mg q6h, oxycodone 7.5 mg q6h PRN for severe pain, dilaudid 0.25mg q4 PRN for breakthrough pain, pregabalin 50 mg 3x daily Presented with Acute back pain after quick movement   - CT followed by MRI of Lumbar and Thoracic spine with no contrast: showed Multilevel degenerative disc disease. Probable large central disc herniation T9/10 with partially calcified extruded disc material extending superiorly behind the T9 vertebral body and compressing the spinal cord.  - Neurosurgery recs (consulted for severe stenosis and possible meningioma): didn't suggest surgery at this time, will notify neurosurg of any change in neuro exam  - Pain control as recommended by pain mgmt team: lidocaine patch daily, robaxin 500 mg q8h, Tylenol 650 mg q6h, oxycodone 7.5 mg q6h PRN for severe pain, dilaudid 0.25mg q4 PRN for breakthrough pain, pregabalin 50 mg 3x daily  - Pain mgmt team cont to f/u

## 2022-07-21 NOTE — ADVANCED PRACTICE NURSE CONSULT - RECOMMEDATIONS
Intergluteal cleft MASD: Cleanse with bath wipes. Pat dry. Apply Triad cream and leave BUDDY. Apply twice daily and PRN for soiling. Do NOT try to remove all cream at each cleansing; remove only what is visibly soiled and reapply.    Continue to nystatin powder to manage ITD, or you may use InterDry without cream, lotions, and powders to all folds for up to 3 days. Change when sheet is wet or soiled. No Vaccines Administered.

## 2022-07-21 NOTE — PROGRESS NOTE ADULT - PROBLEM SELECTOR PLAN 10
platelets: 140 at admission and consistently low  most likely reactive   - continue to trend with CBC

## 2022-07-21 NOTE — PROGRESS NOTE ADULT - PROBLEM SELECTOR PLAN 7
Resolved. Patient recently admitted for CHF. Since discharge, significantly decreased fluid intake. In ED: BUN: 60; Cr: 2.03  - most likely prerenal 2/2 to reduced PO intake and in the setting of lasix use   - unremarkable UA   - encouraged increased PO fluid intake   - Restarted lasix but continuing to hold losartan

## 2022-07-21 NOTE — PROGRESS NOTE ADULT - PROBLEM SELECTOR PLAN 3
SpO2 drops to 85% on RA  - O2 raised from 2 to 3 L NC due to SpO2 drop overnight  - CXR - no acute focal opacity, no interval change from last admission  - Lasix 40mg IVP once today SpO2 drops to 85% on RA  - O2 req currently at 1LNC  - CXR 7/20 - no acute focal opacity, no interval change from last admission

## 2022-07-21 NOTE — PROGRESS NOTE ADULT - PROBLEM SELECTOR PLAN 9
large complex cystic mass from body of pancreas 12 cm  pt reports remote history of following pancreatic mass with MRI. Pt reports not having follow up in years.   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 Pt reports not having follow up in years.   - CEA and  not elevated  - f/u outpatient for abdominal MRI (pancreatic protocol, without and with contrast)

## 2022-07-21 NOTE — PROGRESS NOTE ADULT - SUBJECTIVE AND OBJECTIVE BOX
INTERVAL HPI/OVERNIGHT EVENTS:    Patient was seen and examined at bedside.  Still with severe lower back pain with movement    CONSTITUTIONAL:  Negative fever or chills, feels well, good appetite  EYES:  Negative  blurry vision or double vision  CARDIOVASCULAR:  Negative for chest pain or palpitations  RESPIRATORY:  Negative for cough, wheezing, or SOB   GASTROINTESTINAL:  Negative for nausea, vomiting, diarrhea, constipation, or abdominal pain  GENITOURINARY:  Negative frequency, urgency or dysuria  NEUROLOGIC:  No headache, confusion, dizziness, lightheadedness      ANTIBIOTICS/RELEVANT:    MEDICATIONS  (STANDING):  acetaminophen     Tablet .. 650 milliGRAM(s) Oral every 6 hours  ascorbic acid 500 milliGRAM(s) Oral daily  ceFAZolin   IVPB 2000 milliGRAM(s) IV Intermittent every 8 hours  ceFAZolin   IVPB      enoxaparin Injectable 40 milliGRAM(s) SubCutaneous every 12 hours  furosemide    Tablet 20 milliGRAM(s) Oral <User Schedule>  levothyroxine 137 MICROGram(s) Oral every 24 hours  lidocaine   4% Patch 1 Patch Transdermal daily  methocarbamol 500 milliGRAM(s) Oral every 8 hours  metoprolol succinate ER 12.5 milliGRAM(s) Oral every 24 hours  multivitamin 1 Tablet(s) Oral daily  nystatin Powder 1 Application(s) Topical two times a day  polyethylene glycol 3350 17 Gram(s) Oral every 24 hours  pregabalin 50 milliGRAM(s) Oral three times a day  senna 1 Tablet(s) Oral every 24 hours  zinc sulfate 220 milliGRAM(s) Oral daily    MEDICATIONS  (PRN):  HYDROmorphone  Injectable 0.25 milliGRAM(s) IV Push every 6 hours PRN Severe Pain (7 - 10)  HYDROmorphone  Injectable 1 milliGRAM(s) IV Push once PRN procedure  HYDROmorphone  Injectable 1 milliGRAM(s) IV Push once PRN procedure  oxyCODONE    IR 7.5 milliGRAM(s) Oral every 4 hours PRN Severe Pain (7 - 10)        Vital Signs Last 24 Hrs  T(C): 36.8 (21 Jul 2022 12:42), Max: 36.9 (21 Jul 2022 05:14)  T(F): 98.2 (21 Jul 2022 12:42), Max: 98.4 (21 Jul 2022 05:14)  HR: 61 (21 Jul 2022 12:42) (61 - 80)  BP: 118/73 (21 Jul 2022 12:42) (117/64 - 143/71)  BP(mean): --  RR: 18 (21 Jul 2022 12:42) (18 - 19)  SpO2: 98% (21 Jul 2022 12:42) (96% - 98%)    Parameters below as of 21 Jul 2022 12:42  Patient On (Oxygen Delivery Method): nasal cannula  O2 Flow (L/min): 3      PHYSICAL EXAM:  Constitutional:  non-toxic, no distress  Eyes:IMKE, EOMI  Ear/Nose/Throat: no oral lesion, no sinus tenderness on percussion	  Neck:  supple  Respiratory: CTA fly  Cardiovascular: S1S2 RRR, no murmurs  Gastrointestinal:soft, (+) BS, no HSM  Extremities:no e/e/c  Vascular: DP Pulse:	right normal; left normal      LABS:                        9.9    6.40  )-----------( 152      ( 21 Jul 2022 08:55 )             30.8     07-21    135  |  95<L>  |  18  ----------------------------<  89  4.1   |  30  |  0.76    Ca    8.5      21 Jul 2022 08:54  Phos  3.4     07-21  Mg     2.0     07-21    TPro  6.5  /  Alb  2.7<L>  /  TBili  0.4  /  DBili  x   /  AST  77<H>  /  ALT  11  /  AlkPhos  77  07-21          MICROBIOLOGY:    Culture - Blood (07.17.22 @ 12:10)    Specimen Source: .Blood Blood    Culture Results:   No growth at 4 days.    Culture - Blood (07.16.22 @ 16:31)    Specimen Source: .Blood Blood    Culture Results:   No growth at 4 days.        RADIOLOGY & ADDITIONAL STUDIES:

## 2022-07-22 ENCOUNTER — TRANSCRIPTION ENCOUNTER (OUTPATIENT)
Age: 87
End: 2022-07-22

## 2022-07-22 VITALS
OXYGEN SATURATION: 95 % | RESPIRATION RATE: 18 BRPM | DIASTOLIC BLOOD PRESSURE: 59 MMHG | HEART RATE: 67 BPM | TEMPERATURE: 98 F | SYSTOLIC BLOOD PRESSURE: 114 MMHG

## 2022-07-22 DIAGNOSIS — K59.00 CONSTIPATION, UNSPECIFIED: ICD-10-CM

## 2022-07-22 LAB
ANION GAP SERPL CALC-SCNC: 10 MMOL/L — SIGNIFICANT CHANGE UP (ref 5–17)
ANISOCYTOSIS BLD QL: SIGNIFICANT CHANGE UP
BASOPHILS # BLD AUTO: 0 K/UL — SIGNIFICANT CHANGE UP (ref 0–0.2)
BASOPHILS NFR BLD AUTO: 0 % — SIGNIFICANT CHANGE UP (ref 0–2)
BUN SERPL-MCNC: 17 MG/DL — SIGNIFICANT CHANGE UP (ref 7–23)
CALCIUM SERPL-MCNC: 8.8 MG/DL — SIGNIFICANT CHANGE UP (ref 8.4–10.5)
CHLORIDE SERPL-SCNC: 94 MMOL/L — LOW (ref 96–108)
CO2 SERPL-SCNC: 31 MMOL/L — SIGNIFICANT CHANGE UP (ref 22–31)
CREAT SERPL-MCNC: 0.76 MG/DL — SIGNIFICANT CHANGE UP (ref 0.5–1.3)
CRP SERPL-MCNC: 93.5 MG/L — HIGH (ref 0–4)
CULTURE RESULTS: SIGNIFICANT CHANGE UP
EGFR: 77 ML/MIN/1.73M2 — SIGNIFICANT CHANGE UP
EOSINOPHIL # BLD AUTO: 0 K/UL — SIGNIFICANT CHANGE UP (ref 0–0.5)
EOSINOPHIL NFR BLD AUTO: 0 % — SIGNIFICANT CHANGE UP (ref 0–6)
ERYTHROCYTE [SEDIMENTATION RATE] IN BLOOD: >130 MM/HR — HIGH
GIANT PLATELETS BLD QL SMEAR: PRESENT — SIGNIFICANT CHANGE UP
GLUCOSE SERPL-MCNC: 104 MG/DL — HIGH (ref 70–99)
HCT VFR BLD CALC: 31.5 % — LOW (ref 34.5–45)
HGB BLD-MCNC: 10.2 G/DL — LOW (ref 11.5–15.5)
HYPOCHROMIA BLD QL: SLIGHT — SIGNIFICANT CHANGE UP
LYMPHOCYTES # BLD AUTO: 1.79 K/UL — SIGNIFICANT CHANGE UP (ref 1–3.3)
LYMPHOCYTES # BLD AUTO: 25 % — SIGNIFICANT CHANGE UP (ref 13–44)
MACROCYTES BLD QL: SLIGHT — SIGNIFICANT CHANGE UP
MAGNESIUM SERPL-MCNC: 2 MG/DL — SIGNIFICANT CHANGE UP (ref 1.6–2.6)
MANUAL SMEAR VERIFICATION: SIGNIFICANT CHANGE UP
MCHC RBC-ENTMCNC: 31.5 PG — SIGNIFICANT CHANGE UP (ref 27–34)
MCHC RBC-ENTMCNC: 32.4 GM/DL — SIGNIFICANT CHANGE UP (ref 32–36)
MCV RBC AUTO: 97.2 FL — SIGNIFICANT CHANGE UP (ref 80–100)
METAMYELOCYTES # FLD: 3.4 % — HIGH (ref 0–0)
MICROCYTES BLD QL: SLIGHT — SIGNIFICANT CHANGE UP
MONOCYTES # BLD AUTO: 0.92 K/UL — HIGH (ref 0–0.9)
MONOCYTES NFR BLD AUTO: 12.9 % — SIGNIFICANT CHANGE UP (ref 2–14)
MYELOCYTES NFR BLD: 2.6 % — HIGH (ref 0–0)
NEUTROPHILS # BLD AUTO: 3.83 K/UL — SIGNIFICANT CHANGE UP (ref 1.8–7.4)
NEUTROPHILS NFR BLD AUTO: 52.6 % — SIGNIFICANT CHANGE UP (ref 43–77)
NEUTS BAND # BLD: 0.9 % — SIGNIFICANT CHANGE UP (ref 0–8)
OVALOCYTES BLD QL SMEAR: SLIGHT — SIGNIFICANT CHANGE UP
PHOSPHATE SERPL-MCNC: 2.9 MG/DL — SIGNIFICANT CHANGE UP (ref 2.5–4.5)
PLAT MORPH BLD: ABNORMAL
PLATELET # BLD AUTO: 220 K/UL — SIGNIFICANT CHANGE UP (ref 150–400)
POIKILOCYTOSIS BLD QL AUTO: SLIGHT — SIGNIFICANT CHANGE UP
POLYCHROMASIA BLD QL SMEAR: SLIGHT — SIGNIFICANT CHANGE UP
POTASSIUM SERPL-MCNC: 4.2 MMOL/L — SIGNIFICANT CHANGE UP (ref 3.5–5.3)
POTASSIUM SERPL-SCNC: 4.2 MMOL/L — SIGNIFICANT CHANGE UP (ref 3.5–5.3)
RBC # BLD: 3.24 M/UL — LOW (ref 3.8–5.2)
RBC # FLD: 19 % — HIGH (ref 10.3–14.5)
RBC BLD AUTO: ABNORMAL
SARS-COV-2 RNA SPEC QL NAA+PROBE: SIGNIFICANT CHANGE UP
SCHISTOCYTES BLD QL AUTO: SLIGHT — SIGNIFICANT CHANGE UP
SODIUM SERPL-SCNC: 135 MMOL/L — SIGNIFICANT CHANGE UP (ref 135–145)
SPECIMEN SOURCE: SIGNIFICANT CHANGE UP
SPHEROCYTES BLD QL SMEAR: SLIGHT — SIGNIFICANT CHANGE UP
VARIANT LYMPHS # BLD: 2.6 % — SIGNIFICANT CHANGE UP (ref 0–6)
WBC # BLD: 7.15 K/UL — SIGNIFICANT CHANGE UP (ref 3.8–10.5)
WBC # FLD AUTO: 7.15 K/UL — SIGNIFICANT CHANGE UP (ref 3.8–10.5)

## 2022-07-22 PROCEDURE — 82570 ASSAY OF URINE CREATININE: CPT

## 2022-07-22 PROCEDURE — 97116 GAIT TRAINING THERAPY: CPT

## 2022-07-22 PROCEDURE — 97161 PT EVAL LOW COMPLEX 20 MIN: CPT

## 2022-07-22 PROCEDURE — 74176 CT ABD & PELVIS W/O CONTRAST: CPT

## 2022-07-22 PROCEDURE — 97110 THERAPEUTIC EXERCISES: CPT

## 2022-07-22 PROCEDURE — 83550 IRON BINDING TEST: CPT

## 2022-07-22 PROCEDURE — 71045 X-RAY EXAM CHEST 1 VIEW: CPT

## 2022-07-22 PROCEDURE — 72131 CT LUMBAR SPINE W/O DYE: CPT

## 2022-07-22 PROCEDURE — 99239 HOSP IP/OBS DSCHRG MGMT >30: CPT | Mod: GC

## 2022-07-22 PROCEDURE — 85652 RBC SED RATE AUTOMATED: CPT

## 2022-07-22 PROCEDURE — 96374 THER/PROPH/DIAG INJ IV PUSH: CPT

## 2022-07-22 PROCEDURE — 83540 ASSAY OF IRON: CPT

## 2022-07-22 PROCEDURE — 84540 ASSAY OF URINE/UREA-N: CPT

## 2022-07-22 PROCEDURE — 96375 TX/PRO/DX INJ NEW DRUG ADDON: CPT

## 2022-07-22 PROCEDURE — 87637 SARSCOV2&INF A&B&RSV AMP PRB: CPT

## 2022-07-22 PROCEDURE — 72146 MRI CHEST SPINE W/O DYE: CPT

## 2022-07-22 PROCEDURE — 87077 CULTURE AEROBIC IDENTIFY: CPT

## 2022-07-22 PROCEDURE — 87086 URINE CULTURE/COLONY COUNT: CPT

## 2022-07-22 PROCEDURE — 85730 THROMBOPLASTIN TIME PARTIAL: CPT

## 2022-07-22 PROCEDURE — 82728 ASSAY OF FERRITIN: CPT

## 2022-07-22 PROCEDURE — 80048 BASIC METABOLIC PNL TOTAL CA: CPT

## 2022-07-22 PROCEDURE — 83605 ASSAY OF LACTIC ACID: CPT

## 2022-07-22 PROCEDURE — 99285 EMERGENCY DEPT VISIT HI MDM: CPT

## 2022-07-22 PROCEDURE — 72148 MRI LUMBAR SPINE W/O DYE: CPT

## 2022-07-22 PROCEDURE — 86301 IMMUNOASSAY TUMOR CA 19-9: CPT

## 2022-07-22 PROCEDURE — 87150 DNA/RNA AMPLIFIED PROBE: CPT

## 2022-07-22 PROCEDURE — 85027 COMPLETE CBC AUTOMATED: CPT

## 2022-07-22 PROCEDURE — U0003: CPT

## 2022-07-22 PROCEDURE — 83880 ASSAY OF NATRIURETIC PEPTIDE: CPT

## 2022-07-22 PROCEDURE — 87186 SC STD MICRODIL/AGAR DIL: CPT

## 2022-07-22 PROCEDURE — 97530 THERAPEUTIC ACTIVITIES: CPT

## 2022-07-22 PROCEDURE — C1751: CPT

## 2022-07-22 PROCEDURE — U0005: CPT

## 2022-07-22 PROCEDURE — 82378 CARCINOEMBRYONIC ANTIGEN: CPT

## 2022-07-22 PROCEDURE — 86140 C-REACTIVE PROTEIN: CPT

## 2022-07-22 PROCEDURE — 83735 ASSAY OF MAGNESIUM: CPT

## 2022-07-22 PROCEDURE — 93005 ELECTROCARDIOGRAM TRACING: CPT

## 2022-07-22 PROCEDURE — A9556: CPT

## 2022-07-22 PROCEDURE — 87040 BLOOD CULTURE FOR BACTERIA: CPT

## 2022-07-22 PROCEDURE — 36415 COLL VENOUS BLD VENIPUNCTURE: CPT

## 2022-07-22 PROCEDURE — 84100 ASSAY OF PHOSPHORUS: CPT

## 2022-07-22 PROCEDURE — 85025 COMPLETE CBC W/AUTO DIFF WBC: CPT

## 2022-07-22 PROCEDURE — 83690 ASSAY OF LIPASE: CPT

## 2022-07-22 PROCEDURE — 81003 URINALYSIS AUTO W/O SCOPE: CPT

## 2022-07-22 PROCEDURE — 84466 ASSAY OF TRANSFERRIN: CPT

## 2022-07-22 PROCEDURE — 36573 INSJ PICC RS&I 5 YR+: CPT

## 2022-07-22 PROCEDURE — 85610 PROTHROMBIN TIME: CPT

## 2022-07-22 PROCEDURE — 78802 RP LOCLZJ TUM WHBDY 1 D IMG: CPT

## 2022-07-22 PROCEDURE — 80053 COMPREHEN METABOLIC PANEL: CPT

## 2022-07-22 PROCEDURE — C8923: CPT

## 2022-07-22 RX ORDER — METHOCARBAMOL 500 MG/1
1 TABLET, FILM COATED ORAL
Qty: 0 | Refills: 0 | DISCHARGE
Start: 2022-07-22

## 2022-07-22 RX ORDER — HYDROMORPHONE HYDROCHLORIDE 2 MG/ML
0.5 INJECTION INTRAMUSCULAR; INTRAVENOUS; SUBCUTANEOUS
Qty: 12 | Refills: 0
Start: 2022-07-22 | End: 2022-07-24

## 2022-07-22 RX ORDER — OXYCODONE HYDROCHLORIDE 5 MG/1
1 TABLET ORAL
Qty: 18 | Refills: 0
Start: 2022-07-22 | End: 2022-07-24

## 2022-07-22 RX ORDER — ACETAMINOPHEN 500 MG
2 TABLET ORAL
Qty: 0 | Refills: 0 | DISCHARGE
Start: 2022-07-22

## 2022-07-22 RX ORDER — POLYETHYLENE GLYCOL 3350 17 G/17G
17 POWDER, FOR SOLUTION ORAL
Qty: 0 | Refills: 0 | DISCHARGE
Start: 2022-07-22

## 2022-07-22 RX ORDER — NYSTATIN CREAM 100000 [USP'U]/G
1 CREAM TOPICAL
Qty: 0 | Refills: 0 | DISCHARGE
Start: 2022-07-22

## 2022-07-22 RX ORDER — SENNA PLUS 8.6 MG/1
1 TABLET ORAL
Qty: 0 | Refills: 0 | DISCHARGE
Start: 2022-07-22

## 2022-07-22 RX ORDER — POLYETHYLENE GLYCOL 3350 17 G/17G
17 POWDER, FOR SOLUTION ORAL
Refills: 0 | Status: DISCONTINUED | OUTPATIENT
Start: 2022-07-22 | End: 2022-07-22

## 2022-07-22 RX ORDER — ASCORBIC ACID 60 MG
1 TABLET,CHEWABLE ORAL
Qty: 0 | Refills: 0 | DISCHARGE
Start: 2022-07-22

## 2022-07-22 RX ORDER — ZINC SULFATE TAB 220 MG (50 MG ZINC EQUIVALENT) 220 (50 ZN) MG
1 TAB ORAL
Qty: 0 | Refills: 0 | DISCHARGE
Start: 2022-07-22

## 2022-07-22 RX ADMIN — NYSTATIN CREAM 1 APPLICATION(S): 100000 CREAM TOPICAL at 18:47

## 2022-07-22 RX ADMIN — OXYCODONE HYDROCHLORIDE 7.5 MILLIGRAM(S): 5 TABLET ORAL at 00:10

## 2022-07-22 RX ADMIN — Medication 650 MILLIGRAM(S): at 12:48

## 2022-07-22 RX ADMIN — Medication 50 MILLIGRAM(S): at 13:01

## 2022-07-22 RX ADMIN — ENOXAPARIN SODIUM 40 MILLIGRAM(S): 100 INJECTION SUBCUTANEOUS at 05:44

## 2022-07-22 RX ADMIN — Medication 650 MILLIGRAM(S): at 13:18

## 2022-07-22 RX ADMIN — LIDOCAINE 1 PATCH: 4 CREAM TOPICAL at 08:00

## 2022-07-22 RX ADMIN — Medication 650 MILLIGRAM(S): at 00:16

## 2022-07-22 RX ADMIN — HYDROMORPHONE HYDROCHLORIDE 0.25 MILLIGRAM(S): 2 INJECTION INTRAMUSCULAR; INTRAVENOUS; SUBCUTANEOUS at 07:33

## 2022-07-22 RX ADMIN — POLYETHYLENE GLYCOL 3350 17 GRAM(S): 17 POWDER, FOR SOLUTION ORAL at 18:46

## 2022-07-22 RX ADMIN — METHOCARBAMOL 500 MILLIGRAM(S): 500 TABLET, FILM COATED ORAL at 01:15

## 2022-07-22 RX ADMIN — Medication 100 MILLIGRAM(S): at 04:19

## 2022-07-22 RX ADMIN — Medication 10 MILLIGRAM(S): at 12:50

## 2022-07-22 RX ADMIN — HYDROMORPHONE HYDROCHLORIDE 0.25 MILLIGRAM(S): 2 INJECTION INTRAMUSCULAR; INTRAVENOUS; SUBCUTANEOUS at 08:00

## 2022-07-22 RX ADMIN — ZINC SULFATE TAB 220 MG (50 MG ZINC EQUIVALENT) 220 MILLIGRAM(S): 220 (50 ZN) TAB at 12:49

## 2022-07-22 RX ADMIN — POLYETHYLENE GLYCOL 3350 17 GRAM(S): 17 POWDER, FOR SOLUTION ORAL at 10:05

## 2022-07-22 RX ADMIN — Medication 1 TABLET(S): at 12:48

## 2022-07-22 RX ADMIN — Medication 137 MICROGRAM(S): at 05:45

## 2022-07-22 RX ADMIN — Medication 12.5 MILLIGRAM(S): at 16:29

## 2022-07-22 RX ADMIN — Medication 650 MILLIGRAM(S): at 01:17

## 2022-07-22 RX ADMIN — Medication 500 MILLIGRAM(S): at 13:04

## 2022-07-22 RX ADMIN — Medication 650 MILLIGRAM(S): at 19:46

## 2022-07-22 RX ADMIN — OXYCODONE HYDROCHLORIDE 7.5 MILLIGRAM(S): 5 TABLET ORAL at 05:40

## 2022-07-22 RX ADMIN — METHOCARBAMOL 500 MILLIGRAM(S): 500 TABLET, FILM COATED ORAL at 10:05

## 2022-07-22 RX ADMIN — Medication 50 MILLIGRAM(S): at 05:44

## 2022-07-22 RX ADMIN — Medication 650 MILLIGRAM(S): at 18:46

## 2022-07-22 RX ADMIN — Medication 650 MILLIGRAM(S): at 07:10

## 2022-07-22 RX ADMIN — Medication 20 MILLIGRAM(S): at 12:48

## 2022-07-22 RX ADMIN — Medication 100 MILLIGRAM(S): at 12:49

## 2022-07-22 RX ADMIN — Medication 650 MILLIGRAM(S): at 07:59

## 2022-07-22 RX ADMIN — METHOCARBAMOL 500 MILLIGRAM(S): 500 TABLET, FILM COATED ORAL at 18:46

## 2022-07-22 RX ADMIN — ENOXAPARIN SODIUM 40 MILLIGRAM(S): 100 INJECTION SUBCUTANEOUS at 18:46

## 2022-07-22 RX ADMIN — OXYCODONE HYDROCHLORIDE 7.5 MILLIGRAM(S): 5 TABLET ORAL at 04:49

## 2022-07-22 RX ADMIN — NYSTATIN CREAM 1 APPLICATION(S): 100000 CREAM TOPICAL at 05:47

## 2022-07-22 NOTE — PROGRESS NOTE ADULT - ATTENDING COMMENTS
85 yo female with PMH HFpEF (last ECHO 7/9) who presented to ED w atraumatic back pain, recently admitted for acute hypoxic resp failure 2/2 to CHF exacerbation, admitted for HERNESTO and pain management for intractable back pain    #Intractable back pain  #Spinal Stenosis with Cord Compression without neurological deficits   -MRI with T9-T10 disk prolapse with Cord Compression , No Motor , Sensory or Autonomic dysfunction   -Palliative care and Pain management consult   -Neurosurgery do not have any plans for Inpatient Surgical Intervention   -PT eval     #Staph Lugdunensis Bacteremia   #L1 spinal osteomyelitis   -Echo without evidence of Endocarditis   -Continue Cefazolin IV for 6 weeks per ID   -PICC on 7/22    #HERNESTO - Cr 2 from base of < 1 with acute drop in GFR.  Likely pre renal given recent diuresis, poor PO intake at home  - Resolved     #Pancreatic mass  - likely incidental finding  - per further history patient was previously told about a pancreatic lesion in the 1980s but did not have follow up imaging    #IRon Deficiency Anemia   #Acute Hypoxic Respiratory Failure - Chest x ray , 40 mg IV lasix , daily weights , incentive spirometry   #HFpEF - Restart Lasix   #Lung nodule - outpatient follow up  #Stage 2 sacral decubitus , pResent on Arrival , wound care , nutrition consult .    Barriers for Dispo : DC to [facility] after PICC
85 yo female with PMH HFpEF (last ECHO 7/9) who presented to ED w atraumatic back pain, recently admitted for acute hypoxic resp failure 2/2 to CHF exacerbation, admitted for HERNESTO and pain management for intractable back pain    #Intractable back pain  -MRI with T9-T10 disk prolapse with Cord Compression , No Motor , Sensory or Autonomic dysfunction   -Palliative care and Pain management consult   -Neurosurgery do not have any plans for Inpatient Surgical Intervention   -PT eval     #Staph Lugdunensis Bacteremia   -Continue IV anbx , f/u Gallium scan , ID recs   -Echo without evidence of Endocarditis     #HERNESTO - Cr 2 from base of < 1 with acute drop in GFR.  Likely pre renal given recent diuresis, poor PO intake at home  - Resolved     #Pancreatic mass  - likely incidental finding  - per further history patient was previously told about a pancreatic lesion in the 1980s but did not have follow up imaging    #Anemia - f/u iron studies  #HFpEF - Restart Lasix   #Lung nodule - outpatient follow up  #Stage 2 sacral decubitus , pResent on Arrival , wound care , nutrition consult
85 yo female with PMH HFpEF (last ECHO 7/9) who presented to ED w atraumatic back pain, recently admitted for acute hypoxic resp failure 2/2 to CHF exacerbation, admitted for HERNESTO and pain management for intractable back pain    #Intractable back pain  #Spinal Stenosis with Cord Compression without neurological deficits   -MRI with T9-T10 disk prolapse with Cord Compression , No Motor , Sensory or Autonomic dysfunction   -Palliative care and Pain management consult   -Neurosurgery do not have any plans for Inpatient Surgical Intervention   -PT eval     #Staph Lugdunensis Bacteremia   -Continue IV anbx , f/u Gallium scan , ID recs   -Echo without evidence of Endocarditis     #HERNESTO - Cr 2 from base of < 1 with acute drop in GFR.  Likely pre renal given recent diuresis, poor PO intake at home  - Resolved     #Pancreatic mass  - likely incidental finding  - per further history patient was previously told about a pancreatic lesion in the 1980s but did not have follow up imaging    #IRon Deficiency Anemia   #Acute Hypoxic Respiratory Failure - Chest x ray , 40 mg IV lasix , daily weights , incentive spirometry   #HFpEF - Restart Lasix   #Lung nodule - outpatient follow up  #Stage 2 sacral decubitus , pResent on Arrival , wound care , nutrition consult .
87 yo female with PMH HFpEF (last ECHO 7/9) who presented to ED w atraumatic back pain, recently admitted for acute hypoxic resp failure 2/2 to CHF exacerbation, admitted for HERNESTO and pain management for intractable back pain    #Intractable back pain  -MRI with T9-T10 disk prolapse with Cord Compression , No Motor , Sensory or Autonomic dysfunction   -Palliative care and Pain management consult   -Neurosurgery do not have any plans for Inpatient Surgical Intervention   -PT eval     #Staph Ludgensis Bacteremia   -Continue IV anbx , f/u Gallium scan , Echo, ID recs     #HERNESTO - Cr 2 from base of < 1 with acute drop in GFR.  Likely pre renal given recent diuresis, poor PO intake at home  - Resolved     #Pancreatic mass  - likely incidental finding  - per further history patient was previously told about a pancreatic lesion in the 1980s but did not have follow up imaging    #Anemia - f/u iron studies  #HFpEF - Restart Lasix   #Lung nodule - outpatient follow up  #Pressure wound - present on admission .
Patient ready for discharge to rehab   please see discharge summary
85 yo female with PMH HFpEF (last ECHO 7/9) who presented to ED w atraumatic back pain, recently admitted for acute hypoxic resp failure 2/2 to CHF exacerbation, admitted for HERNESTO and pain management for intractable back pain    #Intractable back pain  - patient with sudden onset intractable back pain after lying on couch located in lumbar spine.  Patient able to tolerate minimal movement, ROM of LEs limited by pain but sensation and pulses intact, no loss of bowel or bladder function  - CT spine with multiple levels of mod to severe spinal stenosis, calcified disc extrusion or possible meningioma.  Neurosurgery consult for eval of possible meningioma, appreciate recs  - DDx includes musculoskeletal back spasm but has had poor response to muscle relaxers and opioids, infectious given Bandemia of 11% although no elevated WBC or fevers, no known recent infection  - f/u MR imaging of spine to eval for meningioma as well as infectious process, unable to have CTA due to ARF    #Gram Positive Bacteremia   -Start IV vancomycin , Surveillance cultures 7/16 ,ID consult and Echocardiogram     #ARF - Cr 2 from base of < 1 with acute drop in GFR.  Likely pre renal given recent diuresis, poor PO intake at home  - f/u urine lytes  - patient has omalley placed in ED, monitor Is and Os  - avoiding IVF at this time given recent CHF exacerbation  - hold lasix and losartan    #Pancreatic mass  - likely incidental finding  - per further history patient was previously told about a pancreatic lesion in the 1980s but did not have follow up imaging    #Anemia - f/u iron studies  #HFpEF - hold home lasix due to HERNESTO, cautious use of fluids  #Lung nodule - outpatient follow up  #Pressure wound - present on admission

## 2022-07-22 NOTE — PROGRESS NOTE ADULT - PROBLEM SELECTOR PLAN 10
platelets: 140 at admission and consistently low  most likely reactive   - continue to trend with CBC large complex cystic mass from body of pancreas 12 cm  pt reports remote history of following pancreatic mass with MRI. Pt reports not having follow up in years.   - CEA and  not elevated  - f/u outpatient for abdominal MRI (pancreatic protocol, without and with contrast)

## 2022-07-22 NOTE — PROGRESS NOTE ADULT - SUBJECTIVE AND OBJECTIVE BOX
Pain Management Progress Note - [facility] [phone]    HPI: Patient seen and examined today. Patient reports pain is controlled at rest, but reports pain increases with movement. Patient reports overall she feels the Oxycodone PO does help with her pain, but still endorsing sharp pain with movement or PT. Patient denies any side effects from current pain regimen at this time.     Pertinent PMH: Pain at: _X__Back ___Neck___Knee ___Hip ___Shoulder ___ Opioid tolerance    Pain is __X_ sharp ____dull ___burning ___achy ___ Intensity: ____ mild ___X_mod __X__severe     Location __X___surgical site _____cervical _X____lumbar ____abd _____upper ext____lower ext    Worse with _X___activity __X__movement __X___physical therapy___ Rest    Improved with __X__medication ____rest ____physical therapy    PAST MEDICAL & SURGICAL HISTORY:  Fluid retention  Hypothyroidism  H/O CHF  S/P appendectomy  S/P cholecystectomy  S/P hysteretomy    MEDICATIONS:  bisacodyl Suppository  polyethylene glycol 3350  furosemide   Injectable  HYDROmorphone  Injectable  oxyCODONE    IR  potassium phosphate IVPB  HYDROmorphone  Injectable  HYDROmorphone  Injectable  potassium phosphate IVPB  polyethylene glycol 3350  senna  sodium phosphate IVPB  losartan  furosemide    Tablet  oxyCODONE    IR  methocarbamol  pregabalin  zinc sulfate  ascorbic acid  ceFAZolin   IVPB  ceFAZolin   IVPB  nystatin Powder  ceFAZolin   IVPB  HYDROmorphone  Injectable  HYDROmorphone  Injectable  multivitamin  oxyCODONE    IR  cyclobenzaprine  lidocaine   4% Patch  vancomycin  IVPB  oxyCODONE    IR  metoprolol succinate ER  metoprolol succinate ER  acetaminophen     Tablet ..  levothyroxine  enoxaparin Injectable  nystatin Powder  cyclobenzaprine  lidocaine   4% Patch  acetaminophen     Tablet ..  HYDROmorphone  Injectable  ondansetron Injectable  HYDROmorphone  Injectable    ROS: Const:  _N__febrile   Eyes:___ENT:___CV: _N__chest pain  Resp: __N__sob  GI:_N__nausea __N_vomiting ___N_abd pain ___npo ___clears _Y__full diet __bm  :___ Musk: __Y_pain ___spasm  Skin:___ Neuro:  _N__sedation___confusion__N__ numbness ___weakness _N__paresthesia  Psych:___anxiety  Endo:___ Heme:___Allergy:_LEVAQUIN__      07-22 @ 08:360.76 mg/dL  Hemoglobin: 10.2 g/dL (07-22 @ 08:36)  Hemoglobin: 9.9 g/dL (07-21 @ 08:55)  T(C): 36.7 (07-22-22 @ 09:40), Max: 37.1 (07-21-22 @ 21:00)  HR: 76 (07-22-22 @ 09:40) (63 - 76)  BP: 125/60 (07-22-22 @ 09:40) (106/64 - 125/60)  RR: 20 (07-22-22 @ 09:40) (18 - 20)  SpO2: 94% (07-22-22 @ 09:40) (94% - 99%)  Wt(kg): --     PHYSICAL EXAM:  Gen Appearance: __X_no acute distress __X_appropriate       Neuro: ___SILT feet____ EOM Intact Psych: AAOX3__, _X__mood/affect appropriate        Eyes: _X__conjunctiva WNL  _X____ Pupils equal and round        ENT: __X_ears and nose atraumatic__X_ Hearing grossly intact        Neck: __X_trachea midline, no visible masses ___thyroid without palpable mass    Resp: _X__Nml WOB____No tactile fremitus ___clear to auscultation    Cardio: _X__extremities free from edema ____pedal pulses palpable    GI/Abdomen: ___soft _____ Nontender____X__Nondistended_____HSM    Lymphatic: ___no palpable nodes in neck  ___no palpable nodes calves and feet    Skin/Wound: ___Incision, _X__Dressing c/d/i,   ____surrounding tissues soft,  ___drain/chest tube present____    Muscular: EHL ___/5  Gastrocnemius___/5    __X_absent clubbing/cyanosis         ASSESSMENT:  This is a 86y old Female with a history of HFpEF, HTN, RA, hypothyroidism, morbid obesity who presented with atraumatic back pain admitted for HERNESTO and severe back pain, with MRI showing grade 1 retrolisthesis of L1 and L2 and anterolisthesis of L4-5, multilevel disc disease with spinal stenosis and neural foramen narrowing, with reports of back pain improved.    Recommended Treatment PLAN:  1. Consider continuing Oxycodone 7.5 mg PO q4h PRN severe pain. If fails and patient able to tolerate well, consider escalating to Oxycodone 10 mg PO q4h PRN severe pain  2. Consider continuing Dilaudid 0.25 mg IVP q6h PRN breakthrough pain  3. Consider continuing Tylenol 650 mg PO every 6 hours  4. Consider continuing Methocarbamol 500 mg PO TID  5. Consider continuing Lyrica 50 mg PO TID  6. Consider continuing daily lidocaine patch to low back  HOLD ALL OPIOIDS FOR SEDATION, RR<10, O2SAT <93%, SBP <96  Plan discussed with Dr. Houston

## 2022-07-22 NOTE — PROGRESS NOTE ADULT - PROBLEM SELECTOR PLAN 6
Hgb: 9.8 with RDW 19.2, ferritin 793  Consistent with anemia of chronic disease with unknown source (possibly HF, obesity)  - monitor CBC daily Hgb: 10.2 with RDW 19.0, ferritin 793  Consistent with anemia of chronic disease with unknown source (possibly HF, obesity)  - monitor CBC daily

## 2022-07-22 NOTE — PROGRESS NOTE ADULT - PROBLEM SELECTOR PLAN 9
large complex cystic mass from body of pancreas 12 cm  pt reports remote history of following pancreatic mass with MRI. Pt reports not having follow up in years.   - CEA and  not elevated  - f/u outpatient for abdominal MRI (pancreatic protocol, without and with contrast) Erythematous scaly patches present diffusely in skin folds  - Treated w/ nystatin powder  - Wound care f/u

## 2022-07-22 NOTE — PROGRESS NOTE ADULT - PROVIDER SPECIALTY LIST ADULT
Infectious Disease
Infectious Disease
Neurosurgery
Pain Medicine
Infectious Disease
Infectious Disease
Internal Medicine
Neurosurgery
Pain Medicine
Rehab Medicine
Rehab Medicine
Infectious Disease
Internal Medicine
Hospitalist
Internal Medicine
Hospitalist
Internal Medicine

## 2022-07-22 NOTE — PROGRESS NOTE ADULT - ASSESSMENT
87 yo female with PMH HFpEF, sacral decubitus ulcer, who presented to ED w atraumatic back pain 1 day after hospital discharge for acute hypoxic resp failure 2/2 to CHF exacerbation, was admitted for pre renal HERNESTO, and severe back pain found to have +Bcx treated with cefazolin.    87 yo female with PMH HFpEF, sacral decubitus ulcer, who presented to ED w atraumatic back pain 1 day after hospital discharge for acute hypoxic resp failure 2/2 to CHF exacerbation, was admitted for pre renal HERNESTO, and severe back pain found to have +Bcx and evidence of osteomyelitis treated with cefazolin.

## 2022-07-22 NOTE — PROGRESS NOTE ADULT - PROBLEM SELECTOR PLAN 2
admission bloodwork showed no leukocytosis (WBC: 6.43) with bandemia 11%. Patient was afebrile, stable vitals with no sx of active infection.   - Bcx positive for Staph lungdunensis (7/14)  - Bcx from 7/16 and 7/17 showed no growth   - ID recs: Gallium scan to find source - Gallium scan completed. f/u with report  - Continuing IV cefazolin (D6) as per ID recs (transitioned from vanc)  - UA negative for UTI, RVP negative; no focal consolidations on CXR  - TTE showed no evidence of endocarditis admission bloodwork showed no leukocytosis (WBC: 6.43) with bandemia 11%. Patient was afebrile, stable vitals with no sx of active infection.   - Bcx positive for Staph lungdunensis (7/14)  - Bcx from 7/16 and 7/17 showed no growth   - ID recs: Gallium scan to find source - Gallium scan completed with evidence of osteomyelitis as described above  - Continuing IV cefazolin (D7) as per ID recs (transitioned from vanc)- will continue for 6 weeks due to evidence of osteomyelitis  - UA negative for UTI, RVP negative; no focal consolidations on CXR  - TTE showed no evidence of endocarditis

## 2022-07-22 NOTE — DISCHARGE NOTE NURSING/CASE MANAGEMENT/SOCIAL WORK - PATIENT PORTAL LINK FT
You can access the FollowMyHealth Patient Portal offered by [facility] by registering at the following website: [email or web address]. By joining [employer]’s FollowMyHealth portal, you will also be able to view your health information using other applications (apps) compatible with our system.

## 2022-07-22 NOTE — PROGRESS NOTE ADULT - PROBLEM SELECTOR PLAN 3
SpO2 drops to 85% on RA  - O2 req currently at 1LNC  - CXR 7/20 - no acute focal opacity, no interval change from last admission SpO2 drops to 85% on RA  - O2 req currently at 1LNC  - CXR 7/20 - no acute focal opacity, no interval change from last admission  - 7/21: Weaned to 1LNC  - 7/22: O2 drops to 75 on RA. 1L NC restarted

## 2022-07-22 NOTE — PROGRESS NOTE ADULT - REASON FOR ADMISSION
HERNESTO and pain management

## 2022-07-22 NOTE — PROGRESS NOTE ADULT - SUBJECTIVE AND OBJECTIVE BOX
***INCOMPLETE***    INTERVAL HPI/OVERNIGHT EVENTS:  Patient was seen and examined at bedside. As per patient, continues to have severe sharp stabbing lower back pain with movement (severity 10/10). Same as yesterday. Patient denies: urinary or bowel incontinence, loss of sensation, paresthesias, headaches, nausea, vomiting, night sweats, chills, CP, SOB. She's having fluids. She's had no BM since admission.     Vitals:  T(F): 98.8 (21 Jul 2022 21:00), Max: 98.8 (21 Jul 2022 21:00)  HR: 63 (21 Jul 2022 21:00)  BP: 106/64 (21 Jul 2022 21:00)  RR: 18 (21 Jul 2022 21:00)  SpO2: 98% (21 Jul 2022 21:00) (98% - 98%)  temp max in last 48H T(F): , Max: 98.8 (07-21-22 @ 21:00)    PHYSICAL EXAM:  Constitutional: Patient is awake and alert; Morbidly obese; Appears to be resting comfortable when not moving.  HEENT: EOMI, CN II-XII intact, sclera non-icteric  Respiratory: CTA b/l, no wheezing, no rhonchi, no rales  Cardiovascular: normal S1S2; +3/6 systolic murmur loudest at the right upper sternal border that radiates to the carotids  Gastrointestinal: soft, NTND, no masses palpable; hyperactive bowel sounds; improving erythematous scaly patches along abdominal folds concerning for intertrigo covered in nystatin powder; bruising noted bilaterally in lower abdominal quadrants  Extremities: warm, well perfused; radial and posterior tibial pulses present bilaterally; bandage covering lesion on L hip; intertrigo in popliteal fossa bilaterally; LE range of motion limited by pain b/l; no dermatomal sensation deficits noted in UEs or LEs; Reflexes hard to ascertain due to patient position; slightly decreased light touch sensation in toe phalanges   Patient in extreme pain with movement so not able to visualize back or sacral ulcer    MEDICATIONS  (STANDING):  acetaminophen     Tablet .. 650 milliGRAM(s) Oral every 6 hours  ascorbic acid 500 milliGRAM(s) Oral daily  ceFAZolin   IVPB 2000 milliGRAM(s) IV Intermittent every 8 hours (D7)   enoxaparin Injectable 40 milliGRAM(s) SubCutaneous every 12 hours  furosemide    Tablet 20 milliGRAM(s) Oral <User Schedule>  levothyroxine 137 MICROGram(s) Oral every 24 hours  lidocaine   4% Patch 1 Patch Transdermal daily  methocarbamol 500 milliGRAM(s) Oral every 8 hours  metoprolol succinate ER 12.5 milliGRAM(s) Oral every 24 hours  multivitamin 1 Tablet(s) Oral daily  nystatin Powder 1 Application(s) Topical two times a day  polyethylene glycol 3350 17 Gram(s) Oral every 24 hours  pregabalin 50 milliGRAM(s) Oral three times a day  senna 1 Tablet(s) Oral every 24 hours  zinc sulfate 220 milliGRAM(s) Oral daily    MEDICATIONS  (PRN):  HYDROmorphone  Injectable 0.25 milliGRAM(s) IV Push every 6 hours PRN Severe Pain (7 - 10)  oxyCODONE    IR 7.5 milliGRAM(s) Oral every 4 hours PRN Severe Pain (7 - 10)    Allergies    Levaquin (Hives)    LABS:             9.9    6.40  )-----------( 152      ( 21 Jul 2022 08:55 )             30.8     07-21    135  |  95<L>  |  18  ----------------------------<  89  4.1   |  30  |  0.76    Ca    8.5      21 Jul 2022 08:54  Phos  3.4     07-21  Mg     2.0     07-21    TPro  6.5  /  Alb  2.7<L>  /  TBili  0.4  /  DBili  x   /  AST  77<H>  /  ALT  11  /  AlkPhos  77  07-21    LIVER FUNCTIONS - ( 21 Jul 2022 08:54 )  Alb: 2.7 g/dL / Pro: 6.5 g/dL / ALK PHOS: 77 U/L / ALT: 11 U/L / AST: 77 U/L / GGT: x           7/16 BNP 4812    07/14 Anaerobic culture bottle grew Staph lugdunensis - switched to cefazolin 2g q8h as per ID recs  Surveillance cultures neg 7/17    RADIOLOGY & ADDITIONAL TESTS:  Multilevel degenerative disc disease. Probable large central disc herniation T9/10 with partially calcified extruded disc material extending superiorly behind the T9 vertebral body and compressing the spinal cord. Alternative considerations would include a meningioma. This could be further evaluated with a contrast study.    Grade 1 retrolisthesis of L1 on L2 and anterolisthesis of L4 on L5. Multilevel degenerative disc disease with spinal stenosis and neural foramen narrowing as detailed above.    TTE 7/19: mild LVH w/ EF of 50%, Grade I LV diastolic dysfunction and mildly dilated LA.       ***INCOMPLETE***    INTERVAL HPI/OVERNIGHT EVENTS:  Patient was seen and examined at bedside and was in no apparent distress. As per patient, continues to have sharp lower back pain with movement but able to tolerate more movement than yesterday. Patient denies: urinary or bowel incontinence, loss of sensation, paresthesias, headaches, nausea, vomiting, night sweats, chills, CP, SOB. She's having fluids. Patient has had no BM since admission.     Vitals:  T(F): 98.8 (21 Jul 2022 21:00), Max: 98.8 (21 Jul 2022 21:00)  HR: 63 (21 Jul 2022 21:00)  BP: 106/64 (21 Jul 2022 21:00)  RR: 18 (21 Jul 2022 21:00)  SpO2: 98% (21 Jul 2022 21:00) (98% - 98%)  temp max in last 48H T(F): , Max: 98.8 (07-21-22 @ 21:00)    PHYSICAL EXAM:  Constitutional: Patient is awake and alert; Morbidly obese; Appears to be resting comfortably when not moving.  HEENT: EOMI, CN II-XII intact, sclera non-icteric  Respiratory: CTA b/l, no wheezing, no rhonchi, no rales  Cardiovascular: normal S1S2; +3/6 systolic murmur loudest at the right upper sternal border that radiates to the carotids  Gastrointestinal: soft, slight tenderness in left epigastric region, no masses palpable; hyperactive bowel sounds; improving erythematous scaly patches along abdominal folds concerning for intertrigo covered in nystatin powder; healing bruises noted bilaterally in lower abdominal quadrants  Extremities: warm, well perfused; radial and posterior tibial pulses present bilaterally; intertrigo in popliteal fossa bilaterally; LE range of motion limited by pain b/l; no dermatomal sensation deficits noted in UEs or LEs; Reflexes hard to ascertain due to patient position; slightly decreased light touch sensation in toe phalanges   Healing stage 1 sacral decubitus ulcer with no drainage or necrotic tissue    MEDICATIONS  (STANDING):  acetaminophen     Tablet .. 650 milliGRAM(s) Oral every 6 hours  ascorbic acid 500 milliGRAM(s) Oral daily  ceFAZolin   IVPB 2000 milliGRAM(s) IV Intermittent every 8 hours (D7)   enoxaparin Injectable 40 milliGRAM(s) SubCutaneous every 12 hours  furosemide    Tablet 20 milliGRAM(s) Oral <User Schedule>  levothyroxine 137 MICROGram(s) Oral every 24 hours  lidocaine   4% Patch 1 Patch Transdermal daily  methocarbamol 500 milliGRAM(s) Oral every 8 hours  metoprolol succinate ER 12.5 milliGRAM(s) Oral every 24 hours  multivitamin 1 Tablet(s) Oral daily  nystatin Powder 1 Application(s) Topical two times a day  polyethylene glycol 3350 17 Gram(s) Oral every 24 hours  pregabalin 50 milliGRAM(s) Oral three times a day  senna 1 Tablet(s) Oral every 24 hours  zinc sulfate 220 milliGRAM(s) Oral daily    MEDICATIONS  (PRN):  HYDROmorphone  Injectable 0.25 milliGRAM(s) IV Push every 6 hours PRN Severe Pain (7 - 10)  oxyCODONE    IR 7.5 milliGRAM(s) Oral every 4 hours PRN Severe Pain (7 - 10)    Allergies    Levaquin (Hives)    LABS:             9.9    6.40  )-----------( 152      ( 21 Jul 2022 08:55 )             30.8     07-21    135  |  95<L>  |  18  ----------------------------<  89  4.1   |  30  |  0.76    Ca    8.5      21 Jul 2022 08:54  Phos  3.4     07-21  Mg     2.0     07-21    TPro  6.5  /  Alb  2.7<L>  /  TBili  0.4  /  DBili  x   /  AST  77<H>  /  ALT  11  /  AlkPhos  77  07-21    LIVER FUNCTIONS - ( 21 Jul 2022 08:54 )  Alb: 2.7 g/dL / Pro: 6.5 g/dL / ALK PHOS: 77 U/L / ALT: 11 U/L / AST: 77 U/L / GGT: x           7/16 BNP 4812    07/14 Anaerobic culture bottle grew Staph lugdunensis - switched to cefazolin 2g q8h as per ID recs  Surveillance cultures neg 7/17    RADIOLOGY & ADDITIONAL TESTS:  Multilevel degenerative disc disease. Probable large central disc herniation T9/10 with partially calcified extruded disc material extending superiorly behind the T9 vertebral body and compressing the spinal cord. Alternative considerations would include a meningioma. This could be further evaluated with a contrast study.    Grade 1 retrolisthesis of L1 on L2 and anterolisthesis of L4 on L5. Multilevel degenerative disc disease with spinal stenosis and neural foramen narrowing as detailed above.    TTE 7/19: mild LVH w/ EF of 50%, Grade I LV diastolic dysfunction and mildly dilated LA.  Gallium scan (7/19-20): Suspected infection in the lower thoracic and lumbar region, probably in T12 and L3 as well as in the right proximal psoas muscle.          INTERVAL HPI/OVERNIGHT EVENTS:  Patient was seen and examined at bedside and was in no apparent distress. As per patient, continues to have sharp lower back pain with movement but able to tolerate more movement than yesterday. Patient denies: urinary or bowel incontinence, loss of sensation, paresthesias, headaches, nausea, vomiting, night sweats, chills, CP, SOB. She's having fluids. Patient has had no BM since admission.     Vitals:  T(F): 98.8 (21 Jul 2022 21:00), Max: 98.8 (21 Jul 2022 21:00)  HR: 63 (21 Jul 2022 21:00)  BP: 106/64 (21 Jul 2022 21:00)  RR: 18 (21 Jul 2022 21:00)  SpO2: 98% (21 Jul 2022 21:00) (98% - 98%)  temp max in last 48H T(F): , Max: 98.8 (07-21-22 @ 21:00)    PHYSICAL EXAM:  Constitutional: Patient is awake and alert; Morbidly obese; Appears to be resting comfortably when not moving.  HEENT: EOMI, CN II-XII intact, sclera non-icteric  Respiratory: CTA b/l, no wheezing, no rhonchi, no rales  Cardiovascular: normal S1S2; +3/6 systolic murmur loudest at the right upper sternal border that radiates to the carotids  Gastrointestinal: soft, slight tenderness in left epigastric region, no masses palpable; hyperactive bowel sounds; improving erythematous scaly patches along abdominal folds concerning for intertrigo covered in nystatin powder; healing bruises noted bilaterally in lower abdominal quadrants  Extremities: warm, well perfused; radial and posterior tibial pulses present bilaterally; intertrigo in popliteal fossa bilaterally; LE range of motion limited by pain b/l; no dermatomal sensation deficits noted in UEs or LEs; Reflexes hard to ascertain due to patient position; slightly decreased light touch sensation in toe phalanges   Healing stage 1 sacral decubitus ulcer with no drainage or necrotic tissue    MEDICATIONS  (STANDING):  acetaminophen     Tablet .. 650 milliGRAM(s) Oral every 6 hours  ascorbic acid 500 milliGRAM(s) Oral daily  ceFAZolin   IVPB 2000 milliGRAM(s) IV Intermittent every 8 hours (D7)   enoxaparin Injectable 40 milliGRAM(s) SubCutaneous every 12 hours  furosemide    Tablet 20 milliGRAM(s) Oral <User Schedule>  levothyroxine 137 MICROGram(s) Oral every 24 hours  lidocaine   4% Patch 1 Patch Transdermal daily  methocarbamol 500 milliGRAM(s) Oral every 8 hours  metoprolol succinate ER 12.5 milliGRAM(s) Oral every 24 hours  multivitamin 1 Tablet(s) Oral daily  nystatin Powder 1 Application(s) Topical two times a day  polyethylene glycol 3350 17 Gram(s) Oral every 24 hours  pregabalin 50 milliGRAM(s) Oral three times a day  senna 1 Tablet(s) Oral every 24 hours  zinc sulfate 220 milliGRAM(s) Oral daily    MEDICATIONS  (PRN):  HYDROmorphone  Injectable 0.25 milliGRAM(s) IV Push every 6 hours PRN Severe Pain (7 - 10)  oxyCODONE    IR 7.5 milliGRAM(s) Oral every 4 hours PRN Severe Pain (7 - 10)    Allergies    Levaquin (Hives)    LABS:             9.9    6.40  )-----------( 152      ( 21 Jul 2022 08:55 )             30.8     07-21    135  |  95<L>  |  18  ----------------------------<  89  4.1   |  30  |  0.76    Ca    8.5      21 Jul 2022 08:54  Phos  3.4     07-21  Mg     2.0     07-21    TPro  6.5  /  Alb  2.7<L>  /  TBili  0.4  /  DBili  x   /  AST  77<H>  /  ALT  11  /  AlkPhos  77  07-21    LIVER FUNCTIONS - ( 21 Jul 2022 08:54 )  Alb: 2.7 g/dL / Pro: 6.5 g/dL / ALK PHOS: 77 U/L / ALT: 11 U/L / AST: 77 U/L / GGT: x           7/16 BNP 4812    07/14 Anaerobic culture bottle grew Staph lugdunensis - switched to cefazolin 2g q8h as per ID recs  Surveillance cultures neg 7/17    RADIOLOGY & ADDITIONAL TESTS:  Multilevel degenerative disc disease. Probable large central disc herniation T9/10 with partially calcified extruded disc material extending superiorly behind the T9 vertebral body and compressing the spinal cord. Alternative considerations would include a meningioma. This could be further evaluated with a contrast study.    Grade 1 retrolisthesis of L1 on L2 and anterolisthesis of L4 on L5. Multilevel degenerative disc disease with spinal stenosis and neural foramen narrowing as detailed above.    TTE 7/19: mild LVH w/ EF of 50%, Grade I LV diastolic dysfunction and mildly dilated LA.  Gallium scan (7/19-20): Suspected infection in the lower thoracic and lumbar region, probably in T12 and L3 as well as in the right proximal psoas muscle.

## 2022-07-22 NOTE — PROGRESS NOTE ADULT - PROBLEM SELECTOR PLAN 1
Presented with Acute back pain after quick movement   - Spine MRI/Gallium scan: cord compression at T9 - reassessed on 7/21 and reported as osteomyelitis in lower thoracic and upper lumbar region extending into psoas  - Neurosurgery recs (consulted for severe stenosis and possible meningioma): didn't suggest surgery at this time, will notify neurosurg of any change in neuro exam  - Pain control as recommended by pain mgmt team: lidocaine patch daily, robaxin 500 mg q8h, Tylenol 650 mg q6h, oxycodone 7.5 mg q6h PRN for severe pain, dilaudid 0.25mg q4 PRN for breakthrough pain, pregabalin 50 mg 3x daily  - Pain mgmt team cont to f/u Presented with Acute back pain after quick movement   - Spine MRI/Gallium scan: cord compression at T9 - reassessed on 7/21 and reported as osteomyelitis in lower thoracic and upper lumbar region extending into psoas  - Continuing IV cefazolin (D7) for 6 weeks  - Neurosurgery recs (consulted for severe stenosis and possible meningioma): didn't suggest surgery at this time, will notify neurosurg of any change in neuro exam  - Pain control as recommended by pain mgmt team: lidocaine patch daily, robaxin 500 mg q8h, Tylenol 650 mg q6h, oxycodone 7.5 mg q6h PRN for severe pain, dilaudid 0.25mg q4 PRN for breakthrough pain, pregabalin 50 mg 3x daily  - Pain mgmt team cont to f/u

## 2022-07-22 NOTE — PROGRESS NOTE ADULT - PROBLEM SELECTOR PLAN 5
Patient on Lasix 20 mg PO M/W/F and losartan 25 mg qd; Toprol 12.5 qd at home  - recent admission to [facility] due to HF exacerbation   - c/w home meds toprol  - Restarted lasix and continued to hold losartan in the setting of resolved HERNESTO and bacteremia  - CXR - no acute focal opacity, no interval change from last admission  - Checking daily weights  - DASH/TLC diet  - f/u outpatient HF team  - Echo showed evidence of mild LVH w/ EF of 50%, Grade I LV diastolic dysfunction and mildly dilated LA

## 2022-07-22 NOTE — PROGRESS NOTE ADULT - PROBLEM SELECTOR PLAN 8
Erythematous scaly patches present diffusely in skin folds  - Treated w/ nystatin powder  - Wound care f/u Resolved. Patient recently admitted for CHF. Since discharge, significantly decreased fluid intake. In ED: BUN: 60; Cr: 2.03  - most likely prerenal 2/2 to reduced PO intake and in the setting of lasix use   - unremarkable UA   - encouraged increased PO fluid intake   - Restarted lasix but continuing to hold losartan

## 2022-07-22 NOTE — PROGRESS NOTE ADULT - PROBLEM SELECTOR PLAN 12
Patient has BMI of 48.3  - Nutrition recs followed -6 mm solid L lower lobe nodule; no hx smoking   -f/up CT chest at 6-12 mos outpatient

## 2022-07-22 NOTE — DISCHARGE NOTE NURSING/CASE MANAGEMENT/SOCIAL WORK - NSDCPEFALRISK_GEN_ALL_CORE
For information on Fall & Injury Prevention, visit: [email or web address] OR  [email or web address] OR  https://www.cdc.gov/steadi/patient.html

## 2022-07-22 NOTE — PROGRESS NOTE ADULT - PROBLEM SELECTOR PLAN 11
-6 mm solid L lower lobe nodule; no hx smoking   -f/up CT chest at 6-12 mos outpatient platelets: 140 at admission and consistently low  most likely reactive   - continue to trend with CBC

## 2022-07-22 NOTE — PROGRESS NOTE ADULT - PROBLEM SELECTOR PLAN 7
Resolved. Patient recently admitted for CHF. Since discharge, significantly decreased fluid intake. In ED: BUN: 60; Cr: 2.03  - most likely prerenal 2/2 to reduced PO intake and in the setting of lasix use   - unremarkable UA   - encouraged increased PO fluid intake   - Restarted lasix but continuing to hold losartan no BM since admission (7/14) possibly due to opioid use. Also need to rule out autonomic dysfunction i/s/o spinal cord compression  - Given Miralax 2x daily and senna

## 2022-07-25 DIAGNOSIS — D64.9 ANEMIA, UNSPECIFIED: ICD-10-CM

## 2022-07-25 DIAGNOSIS — R19.00 INTRA-ABDOMINAL AND PELVIC SWELLING, MASS AND LUMP, UNSPECIFIED SITE: ICD-10-CM

## 2022-07-25 DIAGNOSIS — I11.0 HYPERTENSIVE HEART DISEASE WITH HEART FAILURE: ICD-10-CM

## 2022-07-25 DIAGNOSIS — L30.4 ERYTHEMA INTERTRIGO: ICD-10-CM

## 2022-07-25 DIAGNOSIS — D63.8 ANEMIA IN OTHER CHRONIC DISEASES CLASSIFIED ELSEWHERE: ICD-10-CM

## 2022-07-25 DIAGNOSIS — L89.152 PRESSURE ULCER OF SACRAL REGION, STAGE 2: ICD-10-CM

## 2022-07-25 DIAGNOSIS — M48.061 SPINAL STENOSIS, LUMBAR REGION WITHOUT NEUROGENIC CLAUDICATION: ICD-10-CM

## 2022-07-25 DIAGNOSIS — M47.816 SPONDYLOSIS WITHOUT MYELOPATHY OR RADICULOPATHY, LUMBAR REGION: ICD-10-CM

## 2022-07-25 DIAGNOSIS — N17.9 ACUTE KIDNEY FAILURE, UNSPECIFIED: ICD-10-CM

## 2022-07-25 DIAGNOSIS — E03.9 HYPOTHYROIDISM, UNSPECIFIED: ICD-10-CM

## 2022-07-25 DIAGNOSIS — I50.33 ACUTE ON CHRONIC DIASTOLIC (CONGESTIVE) HEART FAILURE: ICD-10-CM

## 2022-07-25 DIAGNOSIS — K57.30 DIVERTICULOSIS OF LARGE INTESTINE WITHOUT PERFORATION OR ABSCESS WITHOUT BLEEDING: ICD-10-CM

## 2022-07-25 DIAGNOSIS — R78.81 BACTEREMIA: ICD-10-CM

## 2022-07-25 DIAGNOSIS — M48.04 SPINAL STENOSIS, THORACIC REGION: ICD-10-CM

## 2022-07-25 DIAGNOSIS — R91.1 SOLITARY PULMONARY NODULE: ICD-10-CM

## 2022-07-25 DIAGNOSIS — D69.6 THROMBOCYTOPENIA, UNSPECIFIED: ICD-10-CM

## 2022-07-25 DIAGNOSIS — E66.01 MORBID (SEVERE) OBESITY DUE TO EXCESS CALORIES: ICD-10-CM

## 2022-07-25 DIAGNOSIS — J96.01 ACUTE RESPIRATORY FAILURE WITH HYPOXIA: ICD-10-CM

## 2022-07-25 DIAGNOSIS — K86.9 DISEASE OF PANCREAS, UNSPECIFIED: ICD-10-CM

## 2022-08-01 ENCOUNTER — APPOINTMENT (OUTPATIENT)
Dept: INFECTIOUS DISEASE | Facility: CLINIC | Age: 87
End: 2022-08-01

## 2022-08-01 ENCOUNTER — APPOINTMENT (OUTPATIENT)
Dept: INTERNAL MEDICINE | Facility: CLINIC | Age: 87
End: 2022-08-01

## 2022-08-02 ENCOUNTER — APPOINTMENT (OUTPATIENT)
Dept: HEART AND VASCULAR | Facility: CLINIC | Age: 87
End: 2022-08-02

## 2022-08-04 NOTE — PHYSICAL THERAPY INITIAL EVALUATION ADULT - PERTINENT HX OF CURRENT PROBLEM, REHAB EVAL
[employer]
86 year old female  presented to ED with SOB found to be hypoxemic and hypertensive, likely in flash pulmonary edema, admitted to CCU for acute hypoxic respiratory failure 2/2 CHF exacerbation.

## 2022-08-10 ENCOUNTER — APPOINTMENT (OUTPATIENT)
Dept: SPINE | Facility: CLINIC | Age: 87
End: 2022-08-10

## 2022-09-13 ENCOUNTER — APPOINTMENT (OUTPATIENT)
Dept: SPINE | Facility: CLINIC | Age: 87
End: 2022-09-13

## 2022-09-14 NOTE — OCCUPATIONAL THERAPY INITIAL EVALUATION ADULT - LEVEL OF INDEPENDENCE: EATING, OT EVAL
Can I please set up an in office diagnostic hysteroscopy/[employer] to be done on Thursday October 6th in the afternoon (1-5pm)?  If this date does not work, alternatively I could do it Wednesday October 5th at 8:00 a.m. I will need Hernan. independent

## 2022-10-12 ENCOUNTER — APPOINTMENT (OUTPATIENT)
Dept: SPINE | Facility: CLINIC | Age: 87
End: 2022-10-12

## 2022-10-12 ENCOUNTER — NON-APPOINTMENT (OUTPATIENT)
Age: 87
End: 2022-10-12

## 2022-10-12 VITALS
WEIGHT: 240 LBS | HEIGHT: 62 IN | HEART RATE: 78 BPM | BODY MASS INDEX: 44.16 KG/M2 | RESPIRATION RATE: 18 BRPM | SYSTOLIC BLOOD PRESSURE: 141 MMHG | OXYGEN SATURATION: 94 % | DIASTOLIC BLOOD PRESSURE: 96 MMHG | TEMPERATURE: 97 F

## 2022-10-12 PROCEDURE — 99214 OFFICE O/P EST MOD 30 MIN: CPT

## 2022-10-17 ENCOUNTER — LABORATORY RESULT (OUTPATIENT)
Age: 87
End: 2022-10-17

## 2022-10-17 ENCOUNTER — APPOINTMENT (OUTPATIENT)
Dept: INTERNAL MEDICINE | Facility: CLINIC | Age: 87
End: 2022-10-17

## 2022-10-17 VITALS
DIASTOLIC BLOOD PRESSURE: 74 MMHG | HEART RATE: 76 BPM | TEMPERATURE: 97.4 F | OXYGEN SATURATION: 96 % | HEIGHT: 62 IN | RESPIRATION RATE: 18 BRPM | SYSTOLIC BLOOD PRESSURE: 124 MMHG | WEIGHT: 240 LBS | BODY MASS INDEX: 44.16 KG/M2

## 2022-10-17 DIAGNOSIS — R53.81 OTHER MALAISE: ICD-10-CM

## 2022-10-17 DIAGNOSIS — R53.83 OTHER MALAISE: ICD-10-CM

## 2022-10-17 PROCEDURE — 36415 COLL VENOUS BLD VENIPUNCTURE: CPT

## 2022-10-17 PROCEDURE — 99214 OFFICE O/P EST MOD 30 MIN: CPT | Mod: 25

## 2022-10-17 RX ORDER — ESOMEPRAZOLE MAGNESIUM 40 MG/1
40 CAPSULE, DELAYED RELEASE ORAL
Qty: 1 | Refills: 5 | Status: DISCONTINUED | COMMUNITY
Start: 2017-03-23 | End: 2022-10-17

## 2022-10-17 RX ORDER — ESOMEPRAZOLE MAGNESIUM 40 MG/1
40 CAPSULE, DELAYED RELEASE ORAL
Qty: 30 | Refills: 5 | Status: DISCONTINUED | COMMUNITY
Start: 2018-04-24 | End: 2022-10-17

## 2022-10-17 NOTE — HISTORY OF PRESENT ILLNESS
[FreeTextEntry1] : thyroid follow up [de-identified] : 85 yo female with hx of hypothyroidism, CHF- hospitalized over the summer\par alsow ith severe low back pain - hospital then rehab.   thoracic osteomyelitis - Now being evaluated by [facility] - plan for rpt MRI spine in 6 months.  \par Seeing ID Dr Herrera\par Using walker - She has been out of rehab for one month.  \par Getting PT at home.  \par eating well. \par tiired a lot, low energy. never a good sleeper.  \par labs 8/2/22  Hb of 8.4

## 2022-10-17 NOTE — REVIEW OF SYSTEMS
[Fatigue] : fatigue [Incontinence] : incontinence [Negative] : Psychiatric [FreeTextEntry9] : balance issues [de-identified] : balance issues

## 2022-10-17 NOTE — PLAN
[FreeTextEntry1] : CHF-  cont lasix arb\par Gait instability- pt referral\par fatigue  recent anemia- check labs today\par HTN- controlled\par Hypothyroid- cont LT4, check levels today

## 2022-10-17 NOTE — PHYSICAL EXAM
[Well Nourished] : well nourished [Well Developed] : well developed [Well-Appearing] : well-appearing [Normal Sclera/Conjunctiva] : normal sclera/conjunctiva [PERRL] : pupils equal round and reactive to light [EOMI] : extraocular movements intact [Normal Outer Ear/Nose] : the outer ears and nose were normal in appearance [Normal Oropharynx] : the oropharynx was normal [No JVD] : no jugular venous distention [No Lymphadenopathy] : no lymphadenopathy [Supple] : supple [Thyroid Normal, No Nodules] : the thyroid was normal and there were no nodules present [No Respiratory Distress] : no respiratory distress  [No Accessory Muscle Use] : no accessory muscle use [Clear to Auscultation] : lungs were clear to auscultation bilaterally [Normal Rate] : normal rate  [Normal S1, S2] : normal S1 and S2 [No Murmur] : no murmur heard [Pedal Pulses Present] : the pedal pulses are present [No Edema] : there was no peripheral edema [Soft] : abdomen soft [No Masses] : no abdominal mass palpated [No HSM] : no HSM [Normal Posterior Cervical Nodes] : no posterior cervical lymphadenopathy [Normal Anterior Cervical Nodes] : no anterior cervical lymphadenopathy [No CVA Tenderness] : no CVA  tenderness [No Spinal Tenderness] : no spinal tenderness [Grossly Normal Strength/Tone] : grossly normal strength/tone [No Rash] : no rash [Coordination Grossly Intact] : coordination grossly intact [No Focal Deficits] : no focal deficits [Normal Gait] : normal gait [Normal Affect] : the affect was normal [Normal Insight/Judgement] : insight and judgment were intact [de-identified] : regularly irregular rhythm

## 2022-10-18 LAB
ALBUMIN SERPL ELPH-MCNC: 4.4 G/DL
ALP BLD-CCNC: 64 U/L
ALT SERPL-CCNC: 12 U/L
ANION GAP SERPL CALC-SCNC: 12 MMOL/L
APPEARANCE: CLEAR
AST SERPL-CCNC: 20 U/L
BASOPHILS # BLD AUTO: 0.02 K/UL
BASOPHILS NFR BLD AUTO: 0.4 %
BILIRUB SERPL-MCNC: 0.6 MG/DL
BILIRUBIN URINE: NEGATIVE
BLOOD URINE: NEGATIVE
BUN SERPL-MCNC: 18 MG/DL
CALCIUM SERPL-MCNC: 9.6 MG/DL
CHLORIDE SERPL-SCNC: 107 MMOL/L
CHOLEST SERPL-MCNC: 115 MG/DL
CO2 SERPL-SCNC: 24 MMOL/L
COLOR: YELLOW
CREAT SERPL-MCNC: 0.74 MG/DL
EGFR: 80 ML/MIN/1.73M2
EOSINOPHIL # BLD AUTO: 0.08 K/UL
EOSINOPHIL NFR BLD AUTO: 1.7 %
ESTIMATED AVERAGE GLUCOSE: 111 MG/DL
FERRITIN SERPL-MCNC: 617 NG/ML
FOLATE SERPL-MCNC: >20 NG/ML
GLUCOSE QUALITATIVE U: NEGATIVE
GLUCOSE SERPL-MCNC: 97 MG/DL
HBA1C MFR BLD HPLC: 5.5 %
HCT VFR BLD CALC: 36 %
HDLC SERPL-MCNC: 63 MG/DL
HGB BLD-MCNC: 11.2 G/DL
IMM GRANULOCYTES NFR BLD AUTO: 1.1 %
IRON SATN MFR SERPL: 29 %
IRON SERPL-MCNC: 71 UG/DL
KETONES URINE: NEGATIVE
LDLC SERPL CALC-MCNC: 37 MG/DL
LEUKOCYTE ESTERASE URINE: NEGATIVE
LYMPHOCYTES # BLD AUTO: 1.55 K/UL
LYMPHOCYTES NFR BLD AUTO: 33 %
MAN DIFF?: NORMAL
MCHC RBC-ENTMCNC: 31.1 GM/DL
MCHC RBC-ENTMCNC: 31.8 PG
MCV RBC AUTO: 102.3 FL
MONOCYTES # BLD AUTO: 0.62 K/UL
MONOCYTES NFR BLD AUTO: 13.2 %
NEUTROPHILS # BLD AUTO: 2.37 K/UL
NEUTROPHILS NFR BLD AUTO: 50.6 %
NITRITE URINE: NEGATIVE
NONHDLC SERPL-MCNC: 51 MG/DL
PH URINE: 6
PLATELET # BLD AUTO: 144 K/UL
POTASSIUM SERPL-SCNC: 4.5 MMOL/L
PROT SERPL-MCNC: 7.4 G/DL
PROTEIN URINE: ABNORMAL
RBC # BLD: 3.52 M/UL
RBC # FLD: 22.5 %
SODIUM SERPL-SCNC: 144 MMOL/L
SPECIFIC GRAVITY URINE: 1.02
T4 FREE SERPL-MCNC: 1.9 NG/DL
TIBC SERPL-MCNC: 242 UG/DL
TRIGL SERPL-MCNC: 71 MG/DL
TSH SERPL-ACNC: 1.97 UIU/ML
UIBC SERPL-MCNC: 172 UG/DL
UROBILINOGEN URINE: NORMAL
VIT B12 SERPL-MCNC: 688 PG/ML
WBC # FLD AUTO: 4.69 K/UL

## 2022-10-31 ENCOUNTER — RX RENEWAL (OUTPATIENT)
Age: 87
End: 2022-10-31

## 2022-11-01 RX ORDER — MULTIVIT-MIN/FOLIC/VIT K/LYCOP 400-300MCG
500 TABLET ORAL
Qty: 30 | Refills: 0 | Status: ACTIVE | COMMUNITY
Start: 2022-09-15 | End: 1900-01-01

## 2022-11-07 ENCOUNTER — APPOINTMENT (OUTPATIENT)
Dept: INFECTIOUS DISEASE | Facility: CLINIC | Age: 87
End: 2022-11-07

## 2022-11-07 VITALS
TEMPERATURE: 97.8 F | HEIGHT: 62 IN | HEART RATE: 80 BPM | OXYGEN SATURATION: 97 % | SYSTOLIC BLOOD PRESSURE: 130 MMHG | DIASTOLIC BLOOD PRESSURE: 73 MMHG

## 2022-11-07 DIAGNOSIS — B95.8 BACTEREMIA: ICD-10-CM

## 2022-11-07 DIAGNOSIS — R78.81 BACTEREMIA: ICD-10-CM

## 2022-11-07 DIAGNOSIS — M46.20 OSTEOMYELITIS OF VERTEBRA, SITE UNSPECIFIED: ICD-10-CM

## 2022-11-07 PROCEDURE — 99214 OFFICE O/P EST MOD 30 MIN: CPT

## 2022-11-07 RX ORDER — TRAZODONE HYDROCHLORIDE 50 MG/1
50 TABLET ORAL
Qty: 30 | Refills: 5 | Status: COMPLETED | COMMUNITY
Start: 2020-10-23 | End: 2022-11-07

## 2022-11-07 NOTE — PHYSICAL EXAM
[General Appearance - Alert] : alert [General Appearance - In No Acute Distress] : in no acute distress [] : no respiratory distress [Auscultation Breath Sounds / Voice Sounds] : lungs were clear to auscultation bilaterally [Heart Rate And Rhythm] : heart rate was normal and rhythm regular [Heart Sounds] : normal S1 and S2 [Heart Sounds Gallop] : no gallops [Murmurs] : no murmurs [Heart Sounds Pericardial Friction Rub] : no pericardial rub [Motor Tone] : muscle strength and tone were normal [FreeTextEntry1] : no spinous TTP [Deep Tendon Reflexes (DTR)] : deep tendon reflexes were 2+ and symmetric [Sensation] : the sensory exam was normal to light touch and pinprick [No Focal Deficits] : no focal deficits [Oriented To Time, Place, And Person] : oriented to person, place, and time [Affect] : the affect was normal

## 2022-11-07 NOTE — ASSESSMENT
[FreeTextEntry1] : 85 yo female with morbid obesity, HF, admission 7/2022 for T12 osteomyelitis and Staphylococcus lugdunensis BSI s/p 6 week course of cefazolin 7/16-8/27 with significant interval improvement.\par - advise continued observation off antibiotics at this time\par - surveillance spine MRI for meningioma as per nsgy\par \par rtc prn [Risk Reduction] : risk reduction [Medical Care Issues] : medical care issues [Anticipatory Guidance] : anticipatory guidance

## 2022-11-07 NOTE — HISTORY OF PRESENT ILLNESS
[FreeTextEntry1] : Feels well. Finished cefazolin X 6 weeks late August. Marked improvement in LBP; now able to ambulate more with walker--able to independently put on socks and get out of car--which is an improvement. No fever or chills. No LE weakness or numbness.

## 2023-01-26 ENCOUNTER — NON-APPOINTMENT (OUTPATIENT)
Age: 88
End: 2023-01-26

## 2023-01-26 ENCOUNTER — APPOINTMENT (OUTPATIENT)
Dept: OPHTHALMOLOGY | Facility: CLINIC | Age: 88
End: 2023-01-26
Payer: MEDICARE

## 2023-01-26 PROCEDURE — 92134 CPTRZ OPH DX IMG PST SGM RTA: CPT

## 2023-01-26 PROCEDURE — 92004 COMPRE OPH EXAM NEW PT 1/>: CPT

## 2023-01-30 ENCOUNTER — RX RENEWAL (OUTPATIENT)
Age: 88
End: 2023-01-30

## 2023-01-30 RX ORDER — SULFAMETHOXAZOLE AND TRIMETHOPRIM 800; 160 MG/1; MG/1
800-160 TABLET ORAL TWICE DAILY
Qty: 6 | Refills: 2 | Status: ACTIVE | COMMUNITY
Start: 2023-01-09 | End: 1900-01-01

## 2023-02-13 ENCOUNTER — LABORATORY RESULT (OUTPATIENT)
Age: 88
End: 2023-02-13

## 2023-02-13 ENCOUNTER — APPOINTMENT (OUTPATIENT)
Dept: INTERNAL MEDICINE | Facility: CLINIC | Age: 88
End: 2023-02-13
Payer: MEDICARE

## 2023-02-13 VITALS
TEMPERATURE: 98.2 F | HEART RATE: 72 BPM | RESPIRATION RATE: 18 BRPM | SYSTOLIC BLOOD PRESSURE: 118 MMHG | WEIGHT: 245 LBS | DIASTOLIC BLOOD PRESSURE: 78 MMHG | BODY MASS INDEX: 45.08 KG/M2 | OXYGEN SATURATION: 97 % | HEIGHT: 62 IN

## 2023-02-13 DIAGNOSIS — R32 UNSPECIFIED URINARY INCONTINENCE: ICD-10-CM

## 2023-02-13 DIAGNOSIS — I10 ESSENTIAL (PRIMARY) HYPERTENSION: ICD-10-CM

## 2023-02-13 PROCEDURE — 36415 COLL VENOUS BLD VENIPUNCTURE: CPT

## 2023-02-13 PROCEDURE — 99214 OFFICE O/P EST MOD 30 MIN: CPT | Mod: 25

## 2023-02-13 NOTE — PLAN
[FreeTextEntry1] : epistaxis - resolved- check cbc today\par Osteomyelitis - resolved\par  Meningioma- for repeat MRI in the sprin\par Urinary inc-  reviewed kegel exercises, check ua, avoid further medications for now\par CHF- to see Dr Cabrera\par Hypothyroi -cont LT4\par

## 2023-02-13 NOTE — HISTORY OF PRESENT ILLNESS
[FreeTextEntry1] : epistaxis [de-identified] : 85 yo female with hx of hypothyroidism, CHF- thoracic osteomyelitis (s/p tx)\par - low back pain has significantly improved.    \par Seeing ID Dr Herrera\par Using walker - \par eating well. \par recent epistaxis last week called EMT.  \par seeing ortho [facility] end of the month\par Dr Jaimes netx month

## 2023-02-13 NOTE — REVIEW OF SYSTEMS
[Shortness Of Breath] : no shortness of breath [Dyspnea on Exertion] : dyspnea on exertion [Negative] : Psychiatric

## 2023-02-13 NOTE — PHYSICAL EXAM
[Normal] : no jugular venous distention, supple, no lymphadenopathy and the thyroid was normal and there were no nodules present [de-identified] : boggy turbinate right nasal passage

## 2023-02-17 ENCOUNTER — NON-APPOINTMENT (OUTPATIENT)
Age: 88
End: 2023-02-17

## 2023-02-17 LAB
APPEARANCE: CLEAR
BASOPHILS # BLD AUTO: 0.05 K/UL
BASOPHILS NFR BLD AUTO: 0.9 %
BILIRUBIN URINE: NEGATIVE
BLOOD URINE: NEGATIVE
COLOR: YELLOW
EOSINOPHIL # BLD AUTO: 0 K/UL
EOSINOPHIL NFR BLD AUTO: 0 %
GLUCOSE QUALITATIVE U: NEGATIVE
HCT VFR BLD CALC: 35.9 %
HGB BLD-MCNC: 11.1 G/DL
KETONES URINE: NEGATIVE
LEUKOCYTE ESTERASE URINE: NEGATIVE
LYMPHOCYTES # BLD AUTO: 1.76 K/UL
LYMPHOCYTES NFR BLD AUTO: 34.5 %
MAN DIFF?: NORMAL
MCHC RBC-ENTMCNC: 30.7 PG
MCHC RBC-ENTMCNC: 30.9 GM/DL
MCV RBC AUTO: 99.4 FL
MONOCYTES # BLD AUTO: 0.92 K/UL
MONOCYTES NFR BLD AUTO: 18.1 %
NEUTROPHILS # BLD AUTO: 2.37 K/UL
NEUTROPHILS NFR BLD AUTO: 46.5 %
NITRITE URINE: NEGATIVE
PH URINE: 6
PLATELET # BLD AUTO: NORMAL K/UL
PROTEIN URINE: ABNORMAL
RBC # BLD: 3.61 M/UL
RBC # FLD: 24.7 %
SPECIFIC GRAVITY URINE: 1.03
UROBILINOGEN URINE: NORMAL
WBC # FLD AUTO: 5.09 K/UL

## 2023-03-10 ENCOUNTER — APPOINTMENT (OUTPATIENT)
Dept: OPHTHALMOLOGY | Facility: CLINIC | Age: 88
End: 2023-03-10

## 2023-04-04 ENCOUNTER — APPOINTMENT (OUTPATIENT)
Dept: MRI IMAGING | Facility: CLINIC | Age: 88
End: 2023-04-04
Payer: MEDICARE

## 2023-04-04 ENCOUNTER — OUTPATIENT (OUTPATIENT)
Dept: OUTPATIENT SERVICES | Facility: HOSPITAL | Age: 88
LOS: 1 days | End: 2023-04-04

## 2023-04-04 DIAGNOSIS — Z90.49 ACQUIRED ABSENCE OF OTHER SPECIFIED PARTS OF DIGESTIVE TRACT: Chronic | ICD-10-CM

## 2023-04-04 DIAGNOSIS — Z90.710 ACQUIRED ABSENCE OF BOTH CERVIX AND UTERUS: Chronic | ICD-10-CM

## 2023-04-04 PROCEDURE — 72157 MRI CHEST SPINE W/O & W/DYE: CPT | Mod: 26

## 2023-04-18 ENCOUNTER — APPOINTMENT (OUTPATIENT)
Dept: SPINE | Facility: CLINIC | Age: 88
End: 2023-04-18
Payer: MEDICARE

## 2023-04-18 ENCOUNTER — RESULT REVIEW (OUTPATIENT)
Age: 88
End: 2023-04-18

## 2023-04-18 ENCOUNTER — OUTPATIENT (OUTPATIENT)
Dept: OUTPATIENT SERVICES | Facility: HOSPITAL | Age: 88
LOS: 1 days | End: 2023-04-18
Payer: MEDICARE

## 2023-04-18 VITALS
HEIGHT: 62 IN | OXYGEN SATURATION: 98 % | BODY MASS INDEX: 45.08 KG/M2 | RESPIRATION RATE: 18 BRPM | HEART RATE: 63 BPM | SYSTOLIC BLOOD PRESSURE: 126 MMHG | WEIGHT: 245 LBS | DIASTOLIC BLOOD PRESSURE: 76 MMHG

## 2023-04-18 DIAGNOSIS — M48.061 SPINAL STENOSIS, LUMBAR REGION WITHOUT NEUROGENIC CLAUDICATION: ICD-10-CM

## 2023-04-18 DIAGNOSIS — D32.1 BENIGN NEOPLASM OF SPINAL MENINGES: ICD-10-CM

## 2023-04-18 DIAGNOSIS — Z90.49 ACQUIRED ABSENCE OF OTHER SPECIFIED PARTS OF DIGESTIVE TRACT: Chronic | ICD-10-CM

## 2023-04-18 DIAGNOSIS — Z90.710 ACQUIRED ABSENCE OF BOTH CERVIX AND UTERUS: Chronic | ICD-10-CM

## 2023-04-18 PROCEDURE — 72084 X-RAY EXAM ENTIRE SPI 6/> VW: CPT | Mod: 26

## 2023-04-18 PROCEDURE — 99214 OFFICE O/P EST MOD 30 MIN: CPT

## 2023-04-18 PROCEDURE — 72082 X-RAY EXAM ENTIRE SPI 2/3 VW: CPT

## 2023-04-18 PROCEDURE — 72110 X-RAY EXAM L-2 SPINE 4/>VWS: CPT

## 2023-04-20 NOTE — ASSESSMENT
[FreeTextEntry1] : \par Available images reviewed with the patient by Dr. Pierre, thoracic spine findings remains stable\par Would complete images in 1 year to ensure continued stability sooner is new symptoms noted.\par \par REGARDING  lumbar spine:\par Patient will decide if she wishes to completed further images.\par PT script given which she will initiate.

## 2023-04-20 NOTE — ADDENDUM
[FreeTextEntry1] : 2023\par \par \par \par Re:	Trang Ortiz\par :	39\par \par I saw Trang Ortiz in neurosurgical follow-up.  She is being seen because of follow up for a thoracic hemangioma.  \par \par Follow up imaging shows no evidence of enlargement.  It seems to be the same.\par \par She is complaining of back pain but has extensive degenerative changes in the lumbar spine, which are not surgical.  I suggested follow up scan in one year.  \par \par \par \par  \par Juan Carlos Pierre MD\par [place] #[id number]\par \par \par

## 2023-04-20 NOTE — HISTORY OF PRESENT ILLNESS
[FreeTextEntry1] : \par TODAY: She is accompanied by Leanne\pauline Patient returns for images for review to ensure T9 thoracic meningioma remains stable.\par Denies any new changes in gait\par \par She does verbalize concerns of lower back pain onset about 3 weeks ago w/o any known provoking events, the office was made aware and updated images were ordered but has not been completed.\par \par Denies bowel/bladder dysfunction OR worsening gait from her baseline (uses a walker for assistance).\par \par Completed images of thoracic spine is available for review

## 2023-04-20 NOTE — REASON FOR VISIT
[Follow-Up: _____] : a [unfilled] follow-up visit [FreeTextEntry1] : INITIAL CONSULTATION:\par This is a 85 y/o FEMALE who is accompanied by her care navigator Leanne. She comes to discuss a recent finding of images of the thoracic spine completed during her workup for BLE weakness.\par \par Reports a recent admission to [facility] for treatment of severe lower back pain and sudden onset unprovoked BLE weakness resulting in difficulty walking.\par Her workup as noted for an osteomyelitis with an incidental noted thoracic meningoma.\par  \par Post hospital discharged to [facility] where she engaged in aggressive inpatient therapy with good results evidenced by return of BLE strength.\par Reports completion antibiotic therapy Cefazolin from 7/16/2022- 8/27/2022 with ID f/u PENDING (Bcx positive for Staph lungdunensis)\par Discharged from rehab 9/16/2022 and is completing outpatient recommended f/u visit to discuss the next steps in care.\par Outpatient PT/OT started as recommended\par \par She comes today using a walker which she didn't need prior to symptom onset.\par Denies recurrent weakness/back pain. \par  \par

## 2023-04-20 NOTE — PHYSICAL EXAM
[General Appearance - Alert] : alert [General Appearance - Well Nourished] : well nourished [General Appearance - Well-Appearing] : healthy appearing [Oriented To Time, Place, And Person] : oriented to person, place, and time [Person] : oriented to person [Place] : oriented to place [Time] : oriented to time [4] : S1 toe walking 4/5 [3+] : Ankle jerk left 3+ [No Visual Abnormalities] : no visible abnormailities [Sclera] : the sclera and conjunctiva were normal [Neck Appearance] : the appearance of the neck was normal [] : no respiratory distress [Ataxic] : ataxic [FreeTextEntry1] : uses walker for stability

## 2023-05-24 ENCOUNTER — RX RENEWAL (OUTPATIENT)
Age: 88
End: 2023-05-24

## 2023-07-28 NOTE — PROGRESS NOTE ADULT - PROBLEM SELECTOR PLAN 6
Erythematous scaly patches present diffusely in skin folds  - Treated w/ nystatin powder Hgb: 9.8 with RDW 19.2, ferritin 793  Consistent with anemia of chronic disease with unknown source (possibly HF, obesity) along with Iron def anemia  - monitor CBC daily  - Give IV iron for 3 days due to stable but downtrending Hb/Hct Hgb: 9.8 with RDW 19.2, ferritin 793  Consistent with anemia of chronic disease with unknown source (possibly HF, obesity)  - monitor CBC daily Methotrexate Counseling:  Patient counseled regarding adverse effects of methotrexate including but not limited to nausea, vomiting, abnormalities in liver function tests. Patients may develop mouth sores, rash, diarrhea, and abnormalities in blood counts. The patient understands that monitoring is required including LFT's and blood counts.  There is a rare possibility of scarring of the liver and lung problems that can occur when taking methotrexate. Persistent nausea, loss of appetite, pale stools, dark urine, cough, and shortness of breath should be reported immediately. Patient advised to discontinue methotrexate treatment at least three months before attempting to become pregnant.  I discussed the need for folate supplements while taking methotrexate.  These supplements can decrease side effects during methotrexate treatment. The patient verbalized understanding of the proper use and possible adverse effects of methotrexate.  All of the patient's questions and concerns were addressed.

## 2023-08-02 NOTE — OCCUPATIONAL THERAPY INITIAL EVALUATION ADULT - WEIGHT-BEARING RESTRICTIONS: TOILET, REHAB EVAL
History:  reports that she has been smoking. She has been smoking about 0.50 packs per day. She has never used smokeless tobacco. She reports that she does not drink alcohol or use drugs. Family History: family history is not on file. Allergies: Patient has no known allergies. Physical Exam           ED Triage Vitals   BP Temp Temp Source Pulse Resp SpO2 Height Weight   02/14/20 1521 02/14/20 1521 02/14/20 1521 02/14/20 1521 02/14/20 1521 02/14/20 1521 02/14/20 1528 02/14/20 1528   130/86 97.6 °F (36.4 °C) Oral 108 16 96 % 5' (1.524 m) 220 lb (99.8 kg)      Oxygen Saturation Interpretation: Normal.    · Constitutional:  Alert, development consistent with age. · Ears:  External Ears: Bilateral normal.               TM's & External Canals: normal appearance. · Nose:   There is clear rhinorrhea, mucosal erythema and mucosal edema. · Sinuses: no Bilateral maxillary sinus tenderness. no Bilateral frontal sinus tenderness. · Mouth:  normal tongue and buccal mucosa. · Throat: no erythema or exudates noted. Teeth and gums normal, no mild erythema, tonsillar hypertrophy,  and mucous membranes moist.  Airway Patent. · Neck:  Supple. There is no  preauricular, submental, parotid, anterior cervical, posterior cervical, supraclavicular, epitrochlear, axillary and inguinal node tenderness. · Respiratory:   Breath sounds: Bilateral normal, diminished. Lung sounds: normal and wheezing- throughout. · CV:  Regular rate and rhythm, normal heart sounds, without pathological murmurs, ectopy, gallops, or rubs. · GI:  Abdomen Soft, nontender, good bowel sounds. No firm or pulsatile mass. · Integument:  Normal turgor. Warm, dry, without visible rash. · Neurological:  Oriented. Motor functions intact.        Lab / Imaging Results   (All laboratory and radiology results have been personally reviewed by myself)  Labs:  Results for orders placed or performed during the hospital encounter of 02/14/20 infection is unlikely to  be viral in etiology . Antibiotics are indicated at this time based on clinical presentation and physical findings. She is not hypoxic. Patient is well appearing, non toxic and appropriate for outpatient management. Plan of Care: Normal progression of disease discussed. All questions answered. Instruction provided in the use of fluids, vaporizer, acetaminophen, and other OTC medication for symptom control. Extra fluids  Analgesics as needed, dose reviewed. Follow up as needed should symptoms fail to improve. Counseling: The emergency provider has spoken with the patient and discussed todays results, in addition to providing specific details for the plan of care and counseling regarding the diagnosis and prognosis. Questions are answered at this time and they are agreeable with the plan. Assessment     1. Bronchospasm with bronchitis, acute    2. Cigarette smoker one half pack a day or less      Plan   Discharge to home  Patient condition is good    New Medications     New Prescriptions    No medications on file     Electronically signed by SARAH Felix CNP   DD: 2/14/20  **This report was transcribed using voice recognition software. Every effort was made to ensure accuracy; however, inadvertent computerized transcription errors may be present.   END OF ED PROVIDER NOTE       SARAH Felix CNP  02/15/20 5817 Minoxidil Counseling: Minoxidil is a topical medication which can increase blood flow where it is applied. It is uncertain how this medication increases hair growth. Side effects are uncommon and include stinging and allergic reactions. full weight-bearing

## 2023-08-10 ENCOUNTER — APPOINTMENT (OUTPATIENT)
Dept: OPHTHALMOLOGY | Facility: CLINIC | Age: 88
End: 2023-08-10
Payer: MEDICARE

## 2023-08-10 ENCOUNTER — NON-APPOINTMENT (OUTPATIENT)
Age: 88
End: 2023-08-10

## 2023-08-10 PROCEDURE — 92014 COMPRE OPH EXAM EST PT 1/>: CPT

## 2023-08-10 PROCEDURE — 92134 CPTRZ OPH DX IMG PST SGM RTA: CPT

## 2023-09-11 ENCOUNTER — RX RENEWAL (OUTPATIENT)
Age: 88
End: 2023-09-11

## 2023-09-11 RX ORDER — MULTIVITAMIN
TABLET ORAL
Qty: 30 | Refills: 11 | Status: ACTIVE | COMMUNITY
Start: 2023-01-30 | End: 1900-01-01

## 2023-10-11 NOTE — PROGRESS NOTE ADULT - PROBLEM SELECTOR PROBLEM 4
INFORMED MOTHER OF NEG COVID AND STREP. STILL MORE FATIGUED BUT NO OTHER SX TODAY. F/U IF NOT IMPROVING NEXT SEVERAL DAYS.  Pressure ulcer

## 2023-11-08 ENCOUNTER — APPOINTMENT (OUTPATIENT)
Dept: INTERNAL MEDICINE | Facility: CLINIC | Age: 88
End: 2023-11-08
Payer: MEDICARE

## 2023-11-08 DIAGNOSIS — R26.81 UNSTEADINESS ON FEET: ICD-10-CM

## 2023-11-08 PROCEDURE — 99213 OFFICE O/P EST LOW 20 MIN: CPT | Mod: 95

## 2023-11-21 ENCOUNTER — RX RENEWAL (OUTPATIENT)
Age: 88
End: 2023-11-21

## 2023-11-21 DIAGNOSIS — R21 RASH AND OTHER NONSPECIFIC SKIN ERUPTION: ICD-10-CM

## 2023-11-21 RX ORDER — TRIAMCINOLONE ACETONIDE 1 MG/G
0.1 CREAM TOPICAL 3 TIMES DAILY
Qty: 1 | Refills: 0 | Status: ACTIVE | COMMUNITY
Start: 2023-11-21 | End: 1900-01-01

## 2024-01-17 ENCOUNTER — RX RENEWAL (OUTPATIENT)
Age: 89
End: 2024-01-17

## 2024-01-18 NOTE — PATIENT PROFILE ADULT - LIVING ENVIRONMENT
Called wife and she is wondering why they have not yet receive a call to schedule pt's first round of BCG treatments yet.  Informed wife of shortage of BCG but explained BCG stock just starting arriving last week and should be able to get pt scheduled now.  Informed wife one of the RN's will call them on Monday to get pt scheduled.  Wife appreciated the call and information.   no

## 2024-01-23 DIAGNOSIS — U07.1 COVID-19: ICD-10-CM

## 2024-01-23 RX ORDER — NIRMATRELVIR AND RITONAVIR 300-100 MG
20 X 150 MG & KIT ORAL
Qty: 1 | Refills: 0 | Status: ACTIVE | COMMUNITY
Start: 2024-01-23 | End: 1900-01-01

## 2024-01-31 ENCOUNTER — APPOINTMENT (OUTPATIENT)
Dept: INTERNAL MEDICINE | Facility: CLINIC | Age: 89
End: 2024-01-31
Payer: MEDICARE

## 2024-01-31 ENCOUNTER — LABORATORY RESULT (OUTPATIENT)
Age: 89
End: 2024-01-31

## 2024-01-31 ENCOUNTER — NON-APPOINTMENT (OUTPATIENT)
Age: 89
End: 2024-01-31

## 2024-01-31 VITALS
TEMPERATURE: 98.1 F | WEIGHT: 259 LBS | HEIGHT: 62 IN | SYSTOLIC BLOOD PRESSURE: 136 MMHG | OXYGEN SATURATION: 95 % | HEART RATE: 73 BPM | BODY MASS INDEX: 47.66 KG/M2 | RESPIRATION RATE: 16 BRPM | DIASTOLIC BLOOD PRESSURE: 58 MMHG

## 2024-01-31 DIAGNOSIS — I10 ESSENTIAL (PRIMARY) HYPERTENSION: ICD-10-CM

## 2024-01-31 PROCEDURE — 36415 COLL VENOUS BLD VENIPUNCTURE: CPT

## 2024-01-31 PROCEDURE — G2211 COMPLEX E/M VISIT ADD ON: CPT

## 2024-01-31 PROCEDURE — 99214 OFFICE O/P EST MOD 30 MIN: CPT

## 2024-01-31 NOTE — REVIEW OF SYSTEMS
[Shortness Of Breath] : no shortness of breath [Dyspnea on Exertion] : no dyspnea on exertion [Joint Stiffness] : joint stiffness [Negative] : Psychiatric

## 2024-01-31 NOTE — PHYSICAL EXAM
[Normal] : affect was normal and insight and judgment were intact [de-identified] : swelling b/l le   non pitting [de-identified] : slow gait using walker

## 2024-01-31 NOTE — HISTORY OF PRESENT ILLNESS
[de-identified] : 87 yo female with hx of hypothyroidism, CHF- thoracic osteomyelitis (s/p tx)  - s/p admission to [facility] for CHF exacerbation - started on jardiance.  Then started on paxlovid for Covid upon discharge. - Taking furosemide bid as well.  Current weight is 259 - varies between 258 and 260.  -breathing well overall, had minimal covid symptoms.  - seeing Dr Cabrera next week.   had flu / covid / and RSV vaccines.

## 2024-01-31 NOTE — PLAN
[FreeTextEntry1] :  Osteomyelitis - resolved  Meningioma- for repeat MRI in the spring Gait instability- continue to use walker  - reviewed need to walk every down around the home or in the morrow for at least 10 minutes CHF- continue with bid dosing of lasix and continue jardiance - check labs today Hypothyroid -cont LT4

## 2024-02-01 ENCOUNTER — NON-APPOINTMENT (OUTPATIENT)
Age: 89
End: 2024-02-01

## 2024-02-01 LAB
ALBUMIN SERPL ELPH-MCNC: 4.5 G/DL
ALP BLD-CCNC: 68 U/L
ALT SERPL-CCNC: 14 U/L
ANION GAP SERPL CALC-SCNC: 12 MMOL/L
AST SERPL-CCNC: 18 U/L
BASOPHILS # BLD AUTO: 0.01 K/UL
BASOPHILS NFR BLD AUTO: 0.2 %
BILIRUB SERPL-MCNC: 0.5 MG/DL
BUN SERPL-MCNC: 23 MG/DL
CALCIUM SERPL-MCNC: 9.4 MG/DL
CHLORIDE SERPL-SCNC: 102 MMOL/L
CHOLEST SERPL-MCNC: 123 MG/DL
CO2 SERPL-SCNC: 30 MMOL/L
CREAT SERPL-MCNC: 0.91 MG/DL
EGFR: 61 ML/MIN/1.73M2
EOSINOPHIL # BLD AUTO: 0.06 K/UL
EOSINOPHIL NFR BLD AUTO: 1.3 %
ESTIMATED AVERAGE GLUCOSE: 120 MG/DL
GLUCOSE SERPL-MCNC: 110 MG/DL
HBA1C MFR BLD HPLC: 5.8 %
HCT VFR BLD CALC: 38 %
HDLC SERPL-MCNC: 49 MG/DL
HGB BLD-MCNC: 11.7 G/DL
IMM GRANULOCYTES NFR BLD AUTO: 0.4 %
LDLC SERPL CALC-MCNC: 55 MG/DL
LYMPHOCYTES # BLD AUTO: 1.51 K/UL
LYMPHOCYTES NFR BLD AUTO: 33.7 %
MAN DIFF?: NORMAL
MCHC RBC-ENTMCNC: 30.8 GM/DL
MCHC RBC-ENTMCNC: 30.9 PG
MCV RBC AUTO: 100.3 FL
MONOCYTES # BLD AUTO: 0.6 K/UL
MONOCYTES NFR BLD AUTO: 13.4 %
NEUTROPHILS # BLD AUTO: 2.28 K/UL
NEUTROPHILS NFR BLD AUTO: 51 %
NONHDLC SERPL-MCNC: 74 MG/DL
NT-PROBNP SERPL-MCNC: 409 PG/ML
PLATELET # BLD AUTO: 168 K/UL
POTASSIUM SERPL-SCNC: 4.5 MMOL/L
PROT SERPL-MCNC: 7.7 G/DL
RBC # BLD: 3.79 M/UL
RBC # FLD: 22.8 %
SODIUM SERPL-SCNC: 143 MMOL/L
TRIGL SERPL-MCNC: 107 MG/DL
TSH SERPL-ACNC: 0.61 UIU/ML
WBC # FLD AUTO: 4.48 K/UL

## 2024-02-12 ENCOUNTER — RX RENEWAL (OUTPATIENT)
Age: 89
End: 2024-02-12

## 2024-02-12 RX ORDER — FUROSEMIDE 40 MG/1
40 TABLET ORAL TWICE DAILY
Qty: 60 | Refills: 11 | Status: ACTIVE | COMMUNITY
Start: 2022-10-31 | End: 1900-01-01

## 2024-02-12 RX ORDER — EMPAGLIFLOZIN 10 MG/1
10 TABLET, FILM COATED ORAL
Qty: 30 | Refills: 11 | Status: ACTIVE | COMMUNITY
Start: 2024-01-31 | End: 1900-01-01

## 2024-02-15 ENCOUNTER — APPOINTMENT (OUTPATIENT)
Dept: OPHTHALMOLOGY | Facility: CLINIC | Age: 89
End: 2024-02-15

## 2024-02-15 RX ORDER — METOPROLOL SUCCINATE 25 MG/1
25 TABLET, EXTENDED RELEASE ORAL
Qty: 45 | Refills: 3 | Status: ACTIVE | COMMUNITY
Start: 2022-10-04 | End: 1900-01-01

## 2024-06-05 NOTE — PHYSICAL THERAPY INITIAL EVALUATION ADULT - THERAPY FREQUENCY, PT EVAL
Quality 226: Preventive Care And Screening: Tobacco Use: Screening And Cessation Intervention: Patient screened for tobacco use and is an ex/non-smoker Detail Level: Detailed 2-3x/week

## 2024-06-07 RX ORDER — ACETAMINOPHEN 500 MG/1
500 TABLET ORAL
Qty: 100 | Refills: 5 | Status: ACTIVE | COMMUNITY
Start: 2024-06-07 | End: 1900-01-01

## 2024-06-24 ENCOUNTER — APPOINTMENT (OUTPATIENT)
Dept: INTERNAL MEDICINE | Facility: CLINIC | Age: 89
End: 2024-06-24
Payer: MEDICARE

## 2024-06-24 ENCOUNTER — LABORATORY RESULT (OUTPATIENT)
Age: 89
End: 2024-06-24

## 2024-06-24 VITALS
HEIGHT: 62 IN | DIASTOLIC BLOOD PRESSURE: 78 MMHG | RESPIRATION RATE: 16 BRPM | SYSTOLIC BLOOD PRESSURE: 135 MMHG | WEIGHT: 259 LBS | TEMPERATURE: 97.3 F | OXYGEN SATURATION: 95 % | HEART RATE: 77 BPM | BODY MASS INDEX: 47.66 KG/M2

## 2024-06-24 DIAGNOSIS — I50.9 HEART FAILURE, UNSPECIFIED: ICD-10-CM

## 2024-06-24 DIAGNOSIS — M54.50 LOW BACK PAIN, UNSPECIFIED: ICD-10-CM

## 2024-06-24 PROCEDURE — G2211 COMPLEX E/M VISIT ADD ON: CPT

## 2024-06-24 PROCEDURE — 99214 OFFICE O/P EST MOD 30 MIN: CPT

## 2024-06-24 PROCEDURE — 36415 COLL VENOUS BLD VENIPUNCTURE: CPT

## 2024-06-24 RX ORDER — LIDOCAINE 5% 700 MG/1
5 PATCH TOPICAL
Qty: 1 | Refills: 3 | Status: ACTIVE | COMMUNITY
Start: 2024-06-24 | End: 1900-01-01

## 2024-06-25 ENCOUNTER — NON-APPOINTMENT (OUTPATIENT)
Age: 89
End: 2024-06-25

## 2024-06-25 LAB
ALBUMIN SERPL ELPH-MCNC: 4.6 G/DL
ALP BLD-CCNC: 72 U/L
ALT SERPL-CCNC: 15 U/L
ANION GAP SERPL CALC-SCNC: 11 MMOL/L
AST SERPL-CCNC: 21 U/L
BASOPHILS # BLD AUTO: 0 K/UL
BASOPHILS NFR BLD AUTO: 0 %
BILIRUB SERPL-MCNC: 0.6 MG/DL
BUN SERPL-MCNC: 19 MG/DL
CALCIUM SERPL-MCNC: 9.5 MG/DL
CHLORIDE SERPL-SCNC: 103 MMOL/L
CO2 SERPL-SCNC: 27 MMOL/L
CREAT SERPL-MCNC: 0.88 MG/DL
EGFR: 64 ML/MIN/1.73M2
EOSINOPHIL # BLD AUTO: 0.04 K/UL
EOSINOPHIL NFR BLD AUTO: 0.9 %
GLUCOSE SERPL-MCNC: 98 MG/DL
HCT VFR BLD CALC: 36.2 %
HGB BLD-MCNC: 11.5 G/DL
LYMPHOCYTES # BLD AUTO: 1.67 K/UL
LYMPHOCYTES NFR BLD AUTO: 37.8 %
MAN DIFF?: NORMAL
MCHC RBC-ENTMCNC: 30.3 PG
MCHC RBC-ENTMCNC: 31.8 GM/DL
MCV RBC AUTO: 95.3 FL
MONOCYTES # BLD AUTO: 0.36 K/UL
MONOCYTES NFR BLD AUTO: 8.1 %
NEUTROPHILS # BLD AUTO: 1.99 K/UL
NEUTROPHILS NFR BLD AUTO: 45.1 %
PLATELET # BLD AUTO: 145 K/UL
POTASSIUM SERPL-SCNC: 4.3 MMOL/L
PROT SERPL-MCNC: 7.8 G/DL
RBC # BLD: 3.8 M/UL
RBC # FLD: 22.9 %
SODIUM SERPL-SCNC: 141 MMOL/L
WBC # FLD AUTO: 4.41 K/UL

## 2024-07-30 ENCOUNTER — RX RENEWAL (OUTPATIENT)
Age: 89
End: 2024-07-30

## 2024-07-30 RX ORDER — DESONIDE 0.5 MG/G
0.05 CREAM TOPICAL
Qty: 60 | Refills: 1 | Status: ACTIVE | COMMUNITY
Start: 2024-07-30 | End: 1900-01-01

## 2024-07-30 RX ORDER — MOMETASONE FUROATE 1 MG/ML
0.1 SOLUTION TOPICAL
Qty: 60 | Refills: 1 | Status: ACTIVE | COMMUNITY
Start: 2024-07-30 | End: 1900-01-01

## 2024-07-30 RX ORDER — TACROLIMUS 1 MG/G
0.1 OINTMENT TOPICAL
Qty: 60 | Refills: 1 | Status: ACTIVE | COMMUNITY
Start: 2024-07-30 | End: 1900-01-01

## 2024-08-27 ENCOUNTER — RX RENEWAL (OUTPATIENT)
Age: 89
End: 2024-08-27

## 2024-09-16 ENCOUNTER — RX RENEWAL (OUTPATIENT)
Age: 89
End: 2024-09-16

## 2024-09-16 NOTE — DISCHARGE NOTE NURSING/CASE MANAGEMENT/SOCIAL WORK - NSDCPETBCESMAN_GEN_ALL_CORE
Pt c/o pain to LLE while sitting at dinner yesterday.  States pain worse today. States feels warm and swollen.  States worked out without difficulty for 1 hr today.  No falls or injury.    
If you are a smoker, it is important for your health to stop smoking. Please be aware that second hand smoke is also harmful.

## 2024-10-21 ENCOUNTER — RX RENEWAL (OUTPATIENT)
Age: 89
End: 2024-10-21

## 2024-11-18 ENCOUNTER — RX RENEWAL (OUTPATIENT)
Age: 89
End: 2024-11-18

## 2024-11-18 RX ORDER — ACETAMINOPHEN EXTRA STRENGTH 500 MG/1
500 TABLET ORAL
Qty: 100 | Refills: 1 | Status: ACTIVE | COMMUNITY
Start: 2024-11-18 | End: 1900-01-01

## 2024-12-04 ENCOUNTER — RX RENEWAL (OUTPATIENT)
Age: 89
End: 2024-12-04

## 2024-12-06 ENCOUNTER — RX RENEWAL (OUTPATIENT)
Age: 89
End: 2024-12-06

## 2024-12-27 DIAGNOSIS — M79.603 PAIN IN ARM, UNSPECIFIED: ICD-10-CM

## 2024-12-27 DIAGNOSIS — M79.601 PAIN IN RIGHT ARM: ICD-10-CM

## 2025-01-09 RX ORDER — NAPROXEN 500 MG/1
500 TABLET ORAL
Qty: 10 | Refills: 3 | Status: ACTIVE | COMMUNITY
Start: 2025-01-09 | End: 1900-01-01

## 2025-01-13 ENCOUNTER — RX RENEWAL (OUTPATIENT)
Age: 89
End: 2025-01-13

## 2025-02-03 ENCOUNTER — RX RENEWAL (OUTPATIENT)
Age: 89
End: 2025-02-03

## 2025-03-06 ENCOUNTER — TRANSCRIPTION ENCOUNTER (OUTPATIENT)
Age: 89
End: 2025-03-06

## 2025-03-06 ENCOUNTER — RX RENEWAL (OUTPATIENT)
Age: 89
End: 2025-03-06

## 2025-03-06 ENCOUNTER — LABORATORY RESULT (OUTPATIENT)
Age: 89
End: 2025-03-06

## 2025-03-06 ENCOUNTER — APPOINTMENT (OUTPATIENT)
Dept: INTERNAL MEDICINE | Facility: CLINIC | Age: 89
End: 2025-03-06
Payer: MEDICARE

## 2025-03-06 VITALS
OXYGEN SATURATION: 91 % | BODY MASS INDEX: 48.95 KG/M2 | HEART RATE: 81 BPM | SYSTOLIC BLOOD PRESSURE: 133 MMHG | DIASTOLIC BLOOD PRESSURE: 83 MMHG | RESPIRATION RATE: 16 BRPM | HEIGHT: 62 IN | TEMPERATURE: 98.2 F | WEIGHT: 266 LBS

## 2025-03-06 DIAGNOSIS — I10 ESSENTIAL (PRIMARY) HYPERTENSION: ICD-10-CM

## 2025-03-06 DIAGNOSIS — Z00.00 ENCOUNTER FOR GENERAL ADULT MEDICAL EXAMINATION W/OUT ABNORMAL FINDINGS: ICD-10-CM

## 2025-03-06 DIAGNOSIS — R53.81 OTHER MALAISE: ICD-10-CM

## 2025-03-06 DIAGNOSIS — R53.83 OTHER MALAISE: ICD-10-CM

## 2025-03-06 DIAGNOSIS — R09.81 NASAL CONGESTION: ICD-10-CM

## 2025-03-06 PROCEDURE — G0439: CPT

## 2025-03-06 PROCEDURE — 36415 COLL VENOUS BLD VENIPUNCTURE: CPT

## 2025-03-06 RX ORDER — FLUTICASONE PROPIONATE 50 UG/1
50 SPRAY, METERED NASAL DAILY
Qty: 1 | Refills: 3 | Status: DISCONTINUED | COMMUNITY
Start: 2025-03-06 | End: 2025-03-06

## 2025-03-06 RX ORDER — AZELASTINE HYDROCHLORIDE 137 UG/1
137 SPRAY, METERED NASAL TWICE DAILY
Qty: 1 | Refills: 3 | Status: ACTIVE | COMMUNITY
Start: 2025-03-06 | End: 1900-01-01

## 2025-03-06 RX ORDER — AZELASTINE HYDROCHLORIDE 137 UG/1
0.1 SPRAY, METERED NASAL TWICE DAILY
Qty: 1 | Refills: 5 | Status: ACTIVE | COMMUNITY
Start: 2025-03-06 | End: 1900-01-01

## 2025-03-10 ENCOUNTER — NON-APPOINTMENT (OUTPATIENT)
Age: 89
End: 2025-03-10

## 2025-03-10 LAB
ALBUMIN SERPL ELPH-MCNC: 4.4 G/DL
ALP BLD-CCNC: 72 U/L
ALT SERPL-CCNC: 10 U/L
ANION GAP SERPL CALC-SCNC: 13 MMOL/L
APPEARANCE: CLEAR
AST SERPL-CCNC: 14 U/L
BASOPHILS # BLD AUTO: 0.01 K/UL
BASOPHILS NFR BLD AUTO: 0.2 %
BILIRUB SERPL-MCNC: 0.6 MG/DL
BILIRUBIN URINE: NEGATIVE
BLOOD URINE: NEGATIVE
BUN SERPL-MCNC: 20 MG/DL
CALCIUM SERPL-MCNC: 8.9 MG/DL
CHLORIDE SERPL-SCNC: 105 MMOL/L
CHOLEST SERPL-MCNC: 109 MG/DL
CO2 SERPL-SCNC: 25 MMOL/L
COLOR: NORMAL
CREAT SERPL-MCNC: 0.74 MG/DL
EGFRCR SERPLBLD CKD-EPI 2021: 78 ML/MIN/1.73M2
EOSINOPHIL # BLD AUTO: 0.05 K/UL
EOSINOPHIL NFR BLD AUTO: 1.2 %
ESTIMATED AVERAGE GLUCOSE: 137 MG/DL
GLUCOSE QUALITATIVE U: >=1000 MG/DL
GLUCOSE SERPL-MCNC: 110 MG/DL
HBA1C MFR BLD HPLC: 6.4 %
HCT VFR BLD CALC: 35.9 %
HDLC SERPL-MCNC: 59 MG/DL
HGB BLD-MCNC: 11.1 G/DL
IMM GRANULOCYTES NFR BLD AUTO: 1 %
KETONES URINE: NEGATIVE MG/DL
LDLC SERPL CALC-MCNC: 36 MG/DL
LEUKOCYTE ESTERASE URINE: NEGATIVE
LYMPHOCYTES # BLD AUTO: 1.36 K/UL
LYMPHOCYTES NFR BLD AUTO: 32.5 %
MAN DIFF?: NORMAL
MCHC RBC-ENTMCNC: 29.5 PG
MCHC RBC-ENTMCNC: 30.9 G/DL
MCV RBC AUTO: 95.5 FL
MONOCYTES # BLD AUTO: 0.58 K/UL
MONOCYTES NFR BLD AUTO: 13.8 %
NEUTROPHILS # BLD AUTO: 2.15 K/UL
NEUTROPHILS NFR BLD AUTO: 51.3 %
NITRITE URINE: NEGATIVE
NONHDLC SERPL-MCNC: 50 MG/DL
NT-PROBNP SERPL-MCNC: 351 PG/ML
PH URINE: 5
PLATELET # BLD AUTO: 225 K/UL
POTASSIUM SERPL-SCNC: 4.7 MMOL/L
PROT SERPL-MCNC: 7.5 G/DL
PROTEIN URINE: 100 MG/DL
RBC # BLD: 3.76 M/UL
RBC # FLD: 24.7 %
SODIUM SERPL-SCNC: 142 MMOL/L
SPECIFIC GRAVITY URINE: >1.03
TRIGL SERPL-MCNC: 66 MG/DL
TSH SERPL-ACNC: 1.12 UIU/ML
UROBILINOGEN URINE: 1 MG/DL
WBC # FLD AUTO: 4.19 K/UL

## 2025-04-07 ENCOUNTER — RX RENEWAL (OUTPATIENT)
Age: 89
End: 2025-04-07

## 2025-06-10 ENCOUNTER — NON-APPOINTMENT (OUTPATIENT)
Age: 89
End: 2025-06-10

## 2025-06-12 ENCOUNTER — APPOINTMENT (OUTPATIENT)
Dept: ORTHOPEDIC SURGERY | Facility: CLINIC | Age: 89
End: 2025-06-12

## 2025-06-20 ENCOUNTER — APPOINTMENT (OUTPATIENT)
Dept: OPHTHALMOLOGY | Facility: CLINIC | Age: 89
End: 2025-06-20
Payer: MEDICARE

## 2025-06-20 ENCOUNTER — NON-APPOINTMENT (OUTPATIENT)
Age: 89
End: 2025-06-20

## 2025-06-20 PROCEDURE — 92014 COMPRE OPH EXAM EST PT 1/>: CPT

## 2025-06-30 ENCOUNTER — RX RENEWAL (OUTPATIENT)
Age: 89
End: 2025-06-30

## 2025-08-29 ENCOUNTER — RX RENEWAL (OUTPATIENT)
Age: 89
End: 2025-08-29